# Patient Record
Sex: FEMALE | Race: WHITE | NOT HISPANIC OR LATINO | Employment: FULL TIME | URBAN - NONMETROPOLITAN AREA
[De-identification: names, ages, dates, MRNs, and addresses within clinical notes are randomized per-mention and may not be internally consistent; named-entity substitution may affect disease eponyms.]

---

## 2023-05-24 LAB
ALANINE AMINOTRANSFERASE (SGPT) (U/L) IN SER/PLAS: 12 U/L (ref 7–45)
ALBUMIN (G/DL) IN SER/PLAS: 4.1 G/DL (ref 3.4–5)
ALKALINE PHOSPHATASE (U/L) IN SER/PLAS: 59 U/L (ref 33–110)
AMYLASE (U/L) IN SER/PLAS: 27 U/L (ref 29–103)
ANION GAP IN SER/PLAS: 13 MMOL/L (ref 10–20)
ASPARTATE AMINOTRANSFERASE (SGOT) (U/L) IN SER/PLAS: 11 U/L (ref 9–39)
BASOPHILS (10*3/UL) IN BLOOD BY AUTOMATED COUNT: 0.15 X10E9/L (ref 0–0.1)
BASOPHILS/100 LEUKOCYTES IN BLOOD BY AUTOMATED COUNT: 1.4 % (ref 0–2)
BILIRUBIN TOTAL (MG/DL) IN SER/PLAS: 0.4 MG/DL (ref 0–1.2)
CALCIUM (MG/DL) IN SER/PLAS: 9.3 MG/DL (ref 8.6–10.3)
CARBON DIOXIDE, TOTAL (MMOL/L) IN SER/PLAS: 25 MMOL/L (ref 21–32)
CHLORIDE (MMOL/L) IN SER/PLAS: 104 MMOL/L (ref 98–107)
CREATININE (MG/DL) IN SER/PLAS: 0.72 MG/DL (ref 0.5–1.05)
EOSINOPHILS (10*3/UL) IN BLOOD BY AUTOMATED COUNT: 2.75 X10E9/L (ref 0–0.7)
EOSINOPHILS/100 LEUKOCYTES IN BLOOD BY AUTOMATED COUNT: 25.7 % (ref 0–6)
ERYTHROCYTE DISTRIBUTION WIDTH (RATIO) BY AUTOMATED COUNT: 13.9 % (ref 11.5–14.5)
ERYTHROCYTE MEAN CORPUSCULAR HEMOGLOBIN CONCENTRATION (G/DL) BY AUTOMATED: 32.7 G/DL (ref 32–36)
ERYTHROCYTE MEAN CORPUSCULAR VOLUME (FL) BY AUTOMATED COUNT: 96 FL (ref 80–100)
ERYTHROCYTES (10*6/UL) IN BLOOD BY AUTOMATED COUNT: 4.62 X10E12/L (ref 4–5.2)
GFR FEMALE: >90 ML/MIN/1.73M2
GLUCOSE (MG/DL) IN SER/PLAS: 91 MG/DL (ref 74–99)
HEMATOCRIT (%) IN BLOOD BY AUTOMATED COUNT: 44.3 % (ref 36–46)
HEMOGLOBIN (G/DL) IN BLOOD: 14.5 G/DL (ref 12–16)
IMMATURE GRANULOCYTES/100 LEUKOCYTES IN BLOOD BY AUTOMATED COUNT: 0.3 % (ref 0–0.9)
LEUKOCYTES (10*3/UL) IN BLOOD BY AUTOMATED COUNT: 10.7 X10E9/L (ref 4.4–11.3)
LIPASE (U/L) IN SER/PLAS: 9 U/L (ref 9–82)
LYMPHOCYTES (10*3/UL) IN BLOOD BY AUTOMATED COUNT: 2.83 X10E9/L (ref 1.2–4.8)
LYMPHOCYTES/100 LEUKOCYTES IN BLOOD BY AUTOMATED COUNT: 26.4 % (ref 13–44)
MONOCYTES (10*3/UL) IN BLOOD BY AUTOMATED COUNT: 0.71 X10E9/L (ref 0.1–1)
MONOCYTES/100 LEUKOCYTES IN BLOOD BY AUTOMATED COUNT: 6.6 % (ref 2–10)
NEUTROPHILS (10*3/UL) IN BLOOD BY AUTOMATED COUNT: 4.23 X10E9/L (ref 1.2–7.7)
NEUTROPHILS/100 LEUKOCYTES IN BLOOD BY AUTOMATED COUNT: 39.6 % (ref 40–80)
PLATELETS (10*3/UL) IN BLOOD AUTOMATED COUNT: 272 X10E9/L (ref 150–450)
POTASSIUM (MMOL/L) IN SER/PLAS: 3.9 MMOL/L (ref 3.5–5.3)
PROTEIN TOTAL: 7 G/DL (ref 6.4–8.2)
SODIUM (MMOL/L) IN SER/PLAS: 138 MMOL/L (ref 136–145)
UREA NITROGEN (MG/DL) IN SER/PLAS: 9 MG/DL (ref 6–23)

## 2023-05-25 LAB
APPEARANCE, URINE: CLEAR
BILIRUBIN, URINE: NEGATIVE
BLOOD, URINE: NEGATIVE
COLOR, URINE: YELLOW
GLUCOSE, URINE: NEGATIVE MG/DL
KETONES, URINE: NEGATIVE MG/DL
LEUKOCYTE ESTERASE, URINE: ABNORMAL
NITRITE, URINE: NEGATIVE
PH, URINE: 7 (ref 5–8)
PROTEIN, URINE: NEGATIVE MG/DL
RBC, URINE: ABNORMAL /HPF (ref 0–5)
SPECIFIC GRAVITY, URINE: 1.01 (ref 1–1.03)
SQUAMOUS EPITHELIAL CELLS, URINE: 5 /HPF
UROBILINOGEN, URINE: <2 MG/DL (ref 0–1.9)
WBC, URINE: 1 /HPF (ref 0–5)

## 2023-06-06 LAB
ALANINE AMINOTRANSFERASE (SGPT) (U/L) IN SER/PLAS: 12 U/L (ref 7–45)
ALBUMIN (G/DL) IN SER/PLAS: 4.3 G/DL (ref 3.4–5)
ALKALINE PHOSPHATASE (U/L) IN SER/PLAS: 58 U/L (ref 33–110)
ANION GAP IN SER/PLAS: 12 MMOL/L (ref 10–20)
APPEARANCE, URINE: ABNORMAL
ASPARTATE AMINOTRANSFERASE (SGOT) (U/L) IN SER/PLAS: 12 U/L (ref 9–39)
BACTERIA, URINE: ABNORMAL /HPF
BASOPHILS (10*3/UL) IN BLOOD BY AUTOMATED COUNT: 0.17 X10E9/L (ref 0–0.1)
BASOPHILS/100 LEUKOCYTES IN BLOOD BY AUTOMATED COUNT: 1.4 % (ref 0–2)
BILIRUBIN TOTAL (MG/DL) IN SER/PLAS: 0.5 MG/DL (ref 0–1.2)
BILIRUBIN, URINE: NEGATIVE
BLOOD, URINE: NEGATIVE
CALCIUM (MG/DL) IN SER/PLAS: 9.3 MG/DL (ref 8.6–10.3)
CARBON DIOXIDE, TOTAL (MMOL/L) IN SER/PLAS: 26 MMOL/L (ref 21–32)
CHLORIDE (MMOL/L) IN SER/PLAS: 104 MMOL/L (ref 98–107)
COLOR, URINE: YELLOW
CREATININE (MG/DL) IN SER/PLAS: 0.63 MG/DL (ref 0.5–1.05)
EOSINOPHILS (10*3/UL) IN BLOOD BY AUTOMATED COUNT: 4.33 X10E9/L (ref 0–0.7)
EOSINOPHILS/100 LEUKOCYTES IN BLOOD BY AUTOMATED COUNT: 36.2 % (ref 0–6)
ERYTHROCYTE DISTRIBUTION WIDTH (RATIO) BY AUTOMATED COUNT: 13.7 % (ref 11.5–14.5)
ERYTHROCYTE MEAN CORPUSCULAR HEMOGLOBIN CONCENTRATION (G/DL) BY AUTOMATED: 32.6 G/DL (ref 32–36)
ERYTHROCYTE MEAN CORPUSCULAR VOLUME (FL) BY AUTOMATED COUNT: 95 FL (ref 80–100)
ERYTHROCYTES (10*6/UL) IN BLOOD BY AUTOMATED COUNT: 4.77 X10E12/L (ref 4–5.2)
GFR FEMALE: >90 ML/MIN/1.73M2
GLUCOSE (MG/DL) IN SER/PLAS: 101 MG/DL (ref 74–99)
GLUCOSE, URINE: NEGATIVE MG/DL
HEMATOCRIT (%) IN BLOOD BY AUTOMATED COUNT: 45.4 % (ref 36–46)
HEMOGLOBIN (G/DL) IN BLOOD: 14.8 G/DL (ref 12–16)
IMMATURE GRANULOCYTES/100 LEUKOCYTES IN BLOOD BY AUTOMATED COUNT: 0.2 % (ref 0–0.9)
KETONES, URINE: NEGATIVE MG/DL
LEUKOCYTE ESTERASE, URINE: ABNORMAL
LEUKOCYTES (10*3/UL) IN BLOOD BY AUTOMATED COUNT: 12 X10E9/L (ref 4.4–11.3)
LYMPHOCYTES (10*3/UL) IN BLOOD BY AUTOMATED COUNT: 3.09 X10E9/L (ref 1.2–4.8)
LYMPHOCYTES/100 LEUKOCYTES IN BLOOD BY AUTOMATED COUNT: 25.8 % (ref 13–44)
MONOCYTES (10*3/UL) IN BLOOD BY AUTOMATED COUNT: 0.71 X10E9/L (ref 0.1–1)
MONOCYTES/100 LEUKOCYTES IN BLOOD BY AUTOMATED COUNT: 5.9 % (ref 2–10)
MUCUS, URINE: ABNORMAL /LPF
NEUTROPHILS (10*3/UL) IN BLOOD BY AUTOMATED COUNT: 3.65 X10E9/L (ref 1.2–7.7)
NEUTROPHILS/100 LEUKOCYTES IN BLOOD BY AUTOMATED COUNT: 30.5 % (ref 40–80)
NITRITE, URINE: NEGATIVE
PH, URINE: 5 (ref 5–8)
PLATELETS (10*3/UL) IN BLOOD AUTOMATED COUNT: 253 X10E9/L (ref 150–450)
POTASSIUM (MMOL/L) IN SER/PLAS: 3.8 MMOL/L (ref 3.5–5.3)
PROTEIN TOTAL: 7.3 G/DL (ref 6.4–8.2)
PROTEIN, URINE: NEGATIVE MG/DL
RBC, URINE: 1 /HPF (ref 0–5)
SODIUM (MMOL/L) IN SER/PLAS: 138 MMOL/L (ref 136–145)
SPECIFIC GRAVITY, URINE: 1.01 (ref 1–1.03)
SQUAMOUS EPITHELIAL CELLS, URINE: 3 /HPF
TRANSITIONAL EPITHELIAL CELLS, URINE: <1 /HPF
UREA NITROGEN (MG/DL) IN SER/PLAS: 9 MG/DL (ref 6–23)
UROBILINOGEN, URINE: <2 MG/DL (ref 0–1.9)
WBC, URINE: 4 /HPF (ref 0–5)

## 2023-06-07 LAB
ANTI-NUCLEAR ANTIBODY (ANA): NEGATIVE
HEPATITIS A VIRUS IGM AB PRESENCE IN SER/PLAS BY IMMUNOASSAY: NONREACTIVE
HEPATITIS B VIRUS CORE IGM AB PRESENCE IN SER/PLAS BY IMMUNOASSY: NONREACTIVE
HEPATITIS B VIRUS SURFACE AG PRESENCE IN SERUM: NONREACTIVE
HEPATITIS C VIRUS AB PRESENCE IN SERUM: NONREACTIVE

## 2023-06-08 LAB — URINE CULTURE: NORMAL

## 2023-09-27 PROBLEM — F41.9 ANXIETY: Status: ACTIVE | Noted: 2023-09-27

## 2023-09-27 PROBLEM — F43.10 POSTTRAUMATIC STRESS DISORDER: Status: ACTIVE | Noted: 2023-09-27

## 2023-09-27 PROBLEM — N39.3 STRESS INCONTINENCE, FEMALE: Status: ACTIVE | Noted: 2023-09-27

## 2023-09-27 PROBLEM — K21.9 GASTROESOPHAGEAL REFLUX DISEASE: Status: ACTIVE | Noted: 2023-09-27

## 2023-09-27 PROBLEM — R32 URINARY INCONTINENCE: Status: ACTIVE | Noted: 2023-09-27

## 2023-09-27 PROBLEM — J45.909 ASTHMA (HHS-HCC): Status: ACTIVE | Noted: 2023-09-27

## 2023-09-27 PROBLEM — I10 HYPERTENSION: Status: ACTIVE | Noted: 2023-09-27

## 2023-09-27 PROBLEM — M54.30 SCIATICA: Status: ACTIVE | Noted: 2023-09-27

## 2023-09-27 PROBLEM — G47.00 INSOMNIA: Status: ACTIVE | Noted: 2023-09-27

## 2023-09-27 PROBLEM — R41.840 ATTENTION AND CONCENTRATION DEFICIT: Status: ACTIVE | Noted: 2023-09-27

## 2023-09-27 PROBLEM — D72.10 EOSINOPHILIA: Status: ACTIVE | Noted: 2023-09-27

## 2023-09-27 PROBLEM — R92.8 ABNORMAL MAMMOGRAM OF LEFT BREAST: Status: ACTIVE | Noted: 2023-09-27

## 2023-09-27 RX ORDER — NORTRIPTYLINE HYDROCHLORIDE 50 MG/1
2 CAPSULE ORAL NIGHTLY
COMMUNITY
End: 2023-12-11

## 2023-09-27 RX ORDER — ETODOLAC 400 MG/1
400 TABLET, FILM COATED ORAL EVERY 8 HOURS PRN
COMMUNITY
Start: 2022-03-07 | End: 2024-01-22 | Stop reason: SDUPTHER

## 2023-09-27 RX ORDER — MOMETASONE FUROATE AND FORMOTEROL FUMARATE DIHYDRATE 200; 5 UG/1; UG/1
2 AEROSOL RESPIRATORY (INHALATION)
COMMUNITY
Start: 2023-06-21 | End: 2024-02-26

## 2023-09-27 RX ORDER — SUCRALFATE 1 G/1
1 TABLET ORAL 4 TIMES DAILY
COMMUNITY
Start: 2023-06-27 | End: 2023-11-21 | Stop reason: WASHOUT

## 2023-09-27 RX ORDER — ALBUTEROL SULFATE 90 UG/1
2 AEROSOL, METERED RESPIRATORY (INHALATION) EVERY 4 HOURS PRN
COMMUNITY
End: 2023-12-11

## 2023-09-27 RX ORDER — METHOCARBAMOL 500 MG/1
1.5 TABLET, FILM COATED ORAL EVERY 4 HOURS
COMMUNITY
End: 2023-10-02

## 2023-09-27 RX ORDER — FLUTICASONE PROPIONATE 50 MCG
2 SPRAY, SUSPENSION (ML) NASAL DAILY PRN
COMMUNITY
Start: 2021-05-24 | End: 2023-10-02

## 2023-09-27 RX ORDER — PANTOPRAZOLE SODIUM 40 MG/1
40 TABLET, DELAYED RELEASE ORAL
COMMUNITY
Start: 2023-06-27

## 2023-09-27 RX ORDER — FLUOXETINE HYDROCHLORIDE 20 MG/1
20 CAPSULE ORAL DAILY
COMMUNITY
End: 2024-01-22 | Stop reason: ALTCHOICE

## 2023-09-27 RX ORDER — FLUTICASONE PROPIONATE AND SALMETEROL 500; 50 UG/1; UG/1
1 POWDER RESPIRATORY (INHALATION) 2 TIMES DAILY
COMMUNITY
Start: 2022-05-09 | End: 2023-11-21 | Stop reason: WASHOUT

## 2023-09-27 RX ORDER — LOSARTAN POTASSIUM 100 MG/1
1 TABLET ORAL DAILY
COMMUNITY
Start: 2021-10-23 | End: 2024-05-28 | Stop reason: WASHOUT

## 2023-09-27 RX ORDER — IBUPROFEN 200 MG
200 TABLET ORAL 3 TIMES DAILY PRN
COMMUNITY
End: 2023-11-21 | Stop reason: WASHOUT

## 2023-10-02 DIAGNOSIS — M54.50 CHRONIC BILATERAL LOW BACK PAIN WITHOUT SCIATICA: Primary | ICD-10-CM

## 2023-10-02 DIAGNOSIS — J30.1 SEASONAL ALLERGIC RHINITIS DUE TO POLLEN: Primary | ICD-10-CM

## 2023-10-02 DIAGNOSIS — G89.29 CHRONIC BILATERAL LOW BACK PAIN WITHOUT SCIATICA: Primary | ICD-10-CM

## 2023-10-02 DIAGNOSIS — R10.9 UNSPECIFIED ABDOMINAL PAIN: ICD-10-CM

## 2023-10-02 RX ORDER — DICYCLOMINE HYDROCHLORIDE 20 MG/1
TABLET ORAL
Qty: 90 TABLET | Refills: 1 | Status: SHIPPED | OUTPATIENT
Start: 2023-10-02 | End: 2023-11-21 | Stop reason: WASHOUT

## 2023-10-02 RX ORDER — METHOCARBAMOL 500 MG/1
TABLET, FILM COATED ORAL
Qty: 60 TABLET | Refills: 1 | Status: SHIPPED | OUTPATIENT
Start: 2023-10-02 | End: 2024-01-30 | Stop reason: SDUPTHER

## 2023-10-02 RX ORDER — FLUTICASONE PROPIONATE 50 MCG
SPRAY, SUSPENSION (ML) NASAL
Qty: 16 ML | Refills: 2 | Status: SHIPPED | OUTPATIENT
Start: 2023-10-02 | End: 2024-01-16

## 2023-10-25 ENCOUNTER — APPOINTMENT (OUTPATIENT)
Dept: PRIMARY CARE | Facility: CLINIC | Age: 58
End: 2023-10-25
Payer: MEDICAID

## 2023-11-08 ENCOUNTER — HOSPITAL ENCOUNTER (EMERGENCY)
Facility: HOSPITAL | Age: 58
Discharge: HOME | End: 2023-11-08
Payer: COMMERCIAL

## 2023-11-08 ENCOUNTER — APPOINTMENT (OUTPATIENT)
Dept: RADIOLOGY | Facility: HOSPITAL | Age: 58
End: 2023-11-08
Payer: COMMERCIAL

## 2023-11-08 VITALS
TEMPERATURE: 0.6 F | DIASTOLIC BLOOD PRESSURE: 82 MMHG | SYSTOLIC BLOOD PRESSURE: 130 MMHG | RESPIRATION RATE: 16 BRPM | HEART RATE: 74 BPM | BODY MASS INDEX: 38.28 KG/M2 | OXYGEN SATURATION: 98 % | HEIGHT: 60 IN | WEIGHT: 195 LBS

## 2023-11-08 DIAGNOSIS — S43.401A SPRAIN OF RIGHT SHOULDER, UNSPECIFIED SHOULDER SPRAIN TYPE, INITIAL ENCOUNTER: Primary | ICD-10-CM

## 2023-11-08 PROCEDURE — 99283 EMERGENCY DEPT VISIT LOW MDM: CPT | Mod: 25

## 2023-11-08 PROCEDURE — 2500000005 HC RX 250 GENERAL PHARMACY W/O HCPCS: Mod: SE | Performed by: HEALTH CARE PROVIDER

## 2023-11-08 PROCEDURE — 2500000004 HC RX 250 GENERAL PHARMACY W/ HCPCS (ALT 636 FOR OP/ED): Mod: SE | Performed by: HEALTH CARE PROVIDER

## 2023-11-08 PROCEDURE — 73030 X-RAY EXAM OF SHOULDER: CPT | Mod: RIGHT SIDE | Performed by: RADIOLOGY

## 2023-11-08 PROCEDURE — 99285 EMERGENCY DEPT VISIT HI MDM: CPT | Mod: 25

## 2023-11-08 PROCEDURE — 73030 X-RAY EXAM OF SHOULDER: CPT | Mod: RT,FY

## 2023-11-08 PROCEDURE — 96372 THER/PROPH/DIAG INJ SC/IM: CPT

## 2023-11-08 RX ORDER — LIDOCAINE 560 MG/1
1 PATCH PERCUTANEOUS; TOPICAL; TRANSDERMAL DAILY
Status: DISCONTINUED | OUTPATIENT
Start: 2023-11-08 | End: 2023-11-08 | Stop reason: HOSPADM

## 2023-11-08 RX ORDER — KETOROLAC TROMETHAMINE 30 MG/ML
30 INJECTION, SOLUTION INTRAMUSCULAR; INTRAVENOUS ONCE
Status: COMPLETED | OUTPATIENT
Start: 2023-11-08 | End: 2023-11-08

## 2023-11-08 RX ADMIN — LIDOCAINE 1 PATCH: 560 PATCH PERCUTANEOUS; TOPICAL; TRANSDERMAL at 15:18

## 2023-11-08 RX ADMIN — KETOROLAC TROMETHAMINE 30 MG: 30 INJECTION, SOLUTION INTRAMUSCULAR; INTRAVENOUS at 15:17

## 2023-11-08 ASSESSMENT — COLUMBIA-SUICIDE SEVERITY RATING SCALE - C-SSRS
2. HAVE YOU ACTUALLY HAD ANY THOUGHTS OF KILLING YOURSELF?: NO
1. IN THE PAST MONTH, HAVE YOU WISHED YOU WERE DEAD OR WISHED YOU COULD GO TO SLEEP AND NOT WAKE UP?: NO
6. HAVE YOU EVER DONE ANYTHING, STARTED TO DO ANYTHING, OR PREPARED TO DO ANYTHING TO END YOUR LIFE?: NO

## 2023-11-08 ASSESSMENT — PAIN DESCRIPTION - ORIENTATION: ORIENTATION: RIGHT

## 2023-11-08 ASSESSMENT — PAIN SCALES - GENERAL: PAINLEVEL_OUTOF10: 4

## 2023-11-08 ASSESSMENT — PAIN DESCRIPTION - LOCATION: LOCATION: SHOULDER

## 2023-11-08 ASSESSMENT — PAIN - FUNCTIONAL ASSESSMENT: PAIN_FUNCTIONAL_ASSESSMENT: 0-10

## 2023-11-08 NOTE — ED PROVIDER NOTES
HPI   Chief Complaint   Patient presents with    Shoulder Pain    Fall     Patient had a tripping fall today, twisted and fell onto right shoulder. Pain to right shoulder, denies any other injury       CC: Right shoulder injury  HPI:   58-year-old female presents ED with acute right shoulder injury patient states she tripped on uneven surface, tucked and rolled landing on her right shoulder she does have some minimal range of motion with some tenderness mostly on the anterior superior lateral aspect of the shoulder denies any prior injuries or surgeries to the right upper extremity.  Denies any distal numbness or paresthesia, she did not strike her head or lose consciousness and she denies any headache, dizziness or cervical neck tenderness denies having any chest pain or shortness of breath.  Patient is right-hand dominant.    Additional Limitations to History:   External Records Reviewed: I reviewed recent and relevant outside records including  History Obtained From:     Past Medical History: Per HPI  Medications: Reviewed in EMR and with patient  Allergies:  Reviewed in EMR  Past Surgical History:   Social History:     ------------------------------------------------------------------------------------------------------  Physical Exam:  --Vital signs reviewed in nursing triage note, EMR flow sheets, and at patient's bedside  GEN:  A&Ox3, no acute distress, appears comfortable.  Conversational and appropriate.  No confusion or gross mental status changes.  EYES: EOMI, non-injected sclera.  ENT: Moist mucous membranes, no apparent injuries or lesions.   CARDIO: Normal rate and regular rhythm. No murmurs, rubs, or gallops.  2+ equal pulses of the distal extremities.   PULM: Clear to auscultation bilaterally. No rales, rhonchi, or wheezes. Good symmetric chest expansion.  GI: Soft, non-tender, non-distended. No rebound tenderness or guarding.  SKIN: Warm and dry, no rashes or lesions.  MSK: ROM intact the  extremities without contractures.   EXT: No peripheral edema, contusions, or wounds.   NEURO: Cranial nerves II-XII grossly intact. Sensation to light touch intact and equal bilaterally in upper and lower extremities.  Symmetric 5/5 strength in upper and lower extremities.  PSYCH: Appropriate mood and behavior, converses and responds appropriately during exam.  -------------------------------------------------------------------------------------------------------      Differential Diagnoses Considered:   Chronic Medical Conditions Significantly Affecting Care:   Diagnostic testing considered: [PERC, D-Dimer, PECARN, etc.]      - I independently interpreted: [CXR, CT, POCUS, etc. including your interpretation]  - Labs notable for     Escalation of Care: Appropriate for   Social Determinants of Health Significantly Affecting Care: [Homelessness, lacking transportation, uninsured, unable to afford medications]  Prescription Drug Consideration: [Antibiotics, antivirals, pain medications, etc.]  Discussion of Management with Other Providers:  I discussed the patient/results with: [admitting team, consultant, radiologist, social work, EPAT, case management, PT/OT, RT, PCP, etc.]      Sajan Eng PA-C                          No data recorded                Patient History   Past Medical History:   Diagnosis Date    Personal history of other diseases of the circulatory system 07/11/2022    History of hypertension     Past Surgical History:   Procedure Laterality Date    OTHER SURGICAL HISTORY  07/11/2022    Cholecystectomy    OTHER SURGICAL HISTORY  07/11/2022    Tonsillectomy     Family History   Problem Relation Name Age of Onset    Other (degenerative joint/disk disease) Mother      Hypertension Mother      Other (scolio) Mother      Leukemia Father      Hypertension Father      Cancer Father      Skin cancer Brother      No Known Problems Daughter      No Known Problems Daughter      Colon cancer Maternal Grandmother       Lung cancer Maternal Grandfather      Heart disease Paternal Grandmother      Stroke Paternal Grandmother      Heart disease Paternal Grandfather      Hypertension Sibling      Cancer Sibling       Social History     Tobacco Use    Smoking status: Not on file    Smokeless tobacco: Not on file   Substance Use Topics    Alcohol use: Not on file    Drug use: Not on file       Physical Exam   ED Triage Vitals [11/08/23 1325]   Temp Heart Rate Resp BP   (!) -17.4 °C (0.6 °F) 74 16 130/82      SpO2 Temp src Heart Rate Source Patient Position   98 % -- -- --      BP Location FiO2 (%)     -- --       Physical Exam  Musculoskeletal:      Right shoulder: Tenderness present. No swelling, deformity or crepitus. Decreased range of motion. Normal pulse.        Arms:          ED Course & MDM   Diagnoses as of 11/08/23 1437   Sprain of right shoulder, unspecified shoulder sprain type, initial encounter       Medical Decision Making  Imaging appears unremarkable, likely contusion soft tissue injury or strain/sprain, conservative management        Procedure  Procedures     Sajan Eng PA-C  11/08/23 1437

## 2023-11-08 NOTE — ED TRIAGE NOTES
Patient had a tripping fall today, twisted and fell onto right shoulder. Pain to right shoulder, denies any other injury

## 2023-11-21 ENCOUNTER — OFFICE VISIT (OUTPATIENT)
Dept: PRIMARY CARE | Facility: CLINIC | Age: 58
End: 2023-11-21
Payer: COMMERCIAL

## 2023-11-21 VITALS
SYSTOLIC BLOOD PRESSURE: 120 MMHG | BODY MASS INDEX: 43.11 KG/M2 | HEIGHT: 60 IN | OXYGEN SATURATION: 98 % | WEIGHT: 219.6 LBS | DIASTOLIC BLOOD PRESSURE: 80 MMHG | HEART RATE: 68 BPM | TEMPERATURE: 94.6 F

## 2023-11-21 DIAGNOSIS — R41.840 ATTENTION AND CONCENTRATION DEFICIT: ICD-10-CM

## 2023-11-21 DIAGNOSIS — G89.29 CHRONIC RIGHT SHOULDER PAIN: ICD-10-CM

## 2023-11-21 DIAGNOSIS — M25.511 CHRONIC RIGHT SHOULDER PAIN: ICD-10-CM

## 2023-11-21 DIAGNOSIS — Z91.030 BEE STING ALLERGY: Primary | ICD-10-CM

## 2023-11-21 DIAGNOSIS — M54.9 DORSALGIA: ICD-10-CM

## 2023-11-21 PROCEDURE — 3074F SYST BP LT 130 MM HG: CPT | Performed by: FAMILY MEDICINE

## 2023-11-21 PROCEDURE — 99214 OFFICE O/P EST MOD 30 MIN: CPT | Performed by: FAMILY MEDICINE

## 2023-11-21 PROCEDURE — 3079F DIAST BP 80-89 MM HG: CPT | Performed by: FAMILY MEDICINE

## 2023-11-21 PROCEDURE — 1036F TOBACCO NON-USER: CPT | Performed by: FAMILY MEDICINE

## 2023-11-21 RX ORDER — GABAPENTIN 300 MG/1
300 CAPSULE ORAL 3 TIMES DAILY
COMMUNITY
Start: 2023-10-04 | End: 2023-11-21 | Stop reason: WASHOUT

## 2023-11-21 RX ORDER — DICLOFENAC SODIUM 75 MG/1
75 TABLET, DELAYED RELEASE ORAL 2 TIMES DAILY
COMMUNITY
Start: 2023-03-08 | End: 2023-11-21 | Stop reason: WASHOUT

## 2023-11-21 RX ORDER — ATOMOXETINE 40 MG/1
40 CAPSULE ORAL DAILY
Qty: 30 CAPSULE | Refills: 1 | Status: SHIPPED | OUTPATIENT
Start: 2023-11-21 | End: 2024-02-26

## 2023-11-21 RX ORDER — EPINEPHRINE 0.3 MG/.3ML
0.3 INJECTION SUBCUTANEOUS AS NEEDED
Qty: 0.6 ML | Refills: 1 | Status: SHIPPED | OUTPATIENT
Start: 2023-11-21

## 2023-11-21 RX ORDER — INHALER, ASSIST DEVICES
1 SPACER (EA) MISCELLANEOUS AS NEEDED
COMMUNITY
Start: 2023-10-11 | End: 2023-11-21 | Stop reason: WASHOUT

## 2023-11-21 RX ORDER — MOMETASONE FUROATE AND FORMOTEROL FUMARATE DIHYDRATE 100; 5 UG/1; UG/1
2 AEROSOL RESPIRATORY (INHALATION) 2 TIMES DAILY
COMMUNITY
Start: 2023-06-08 | End: 2024-05-28 | Stop reason: WASHOUT

## 2023-11-21 RX ORDER — HYDROXYZINE HYDROCHLORIDE 25 MG/1
25 TABLET, FILM COATED ORAL ONCE
COMMUNITY
Start: 2023-01-06 | End: 2023-11-21 | Stop reason: WASHOUT

## 2023-11-21 ASSESSMENT — ENCOUNTER SYMPTOMS
DIFFICULTY URINATING: 0
SHORTNESS OF BREATH: 0
FLANK PAIN: 1
DIZZINESS: 0
DIARRHEA: 0
LOSS OF SENSATION IN FEET: 1
ENDOCRINE NEGATIVE: 1
OCCASIONAL FEELINGS OF UNSTEADINESS: 0
NAUSEA: 0
DEPRESSION: 0
FEVER: 0

## 2023-11-21 ASSESSMENT — PATIENT HEALTH QUESTIONNAIRE - PHQ9
1. LITTLE INTEREST OR PLEASURE IN DOING THINGS: NOT AT ALL
2. FEELING DOWN, DEPRESSED OR HOPELESS: NOT AT ALL
SUM OF ALL RESPONSES TO PHQ9 QUESTIONS 1 AND 2: 0

## 2023-11-21 ASSESSMENT — PAIN SCALES - GENERAL: PAINLEVEL: 4

## 2023-11-21 NOTE — PROGRESS NOTES
Subjective   Patient ID: Sinai Mcbride is a 58 y.o. female who presents for Results, Flank Pain (right), and Fall (X 2 weeks ago).    Concentration deficit, trouble concentrating  Anxiety present  Right arm pain shoulder pain since a fall 2 weeks ago  Right upper quadrant pain-chronic    Flank Pain  This is a chronic problem. The current episode started more than 1 year ago. The problem is unchanged. Pertinent negatives include no chest pain or fever.   Fall  Pertinent negatives include no fever or nausea.        Review of Systems   Constitutional:  Negative for fever.        Also see HPI   Eyes:  Negative for visual disturbance.   Respiratory:  Negative for shortness of breath.    Cardiovascular:  Negative for chest pain.   Gastrointestinal:  Negative for diarrhea and nausea.   Endocrine: Negative.    Genitourinary:  Positive for flank pain. Negative for difficulty urinating.   Skin:  Negative for rash.   Neurological:  Negative for dizziness.        No focal deficits   Psychiatric/Behavioral:  Negative for suicidal ideas.    All other systems reviewed and are negative.      Objective   /80   Pulse 68   Temp 34.8 °C (94.6 °F)   Ht 1.524 m (5')   Wt 99.6 kg (219 lb 9.6 oz)   SpO2 98%   BMI 42.89 kg/m²     Physical Exam  Vitals and nursing note reviewed.   Constitutional:       Appearance: Normal appearance.   HENT:      Head: Normocephalic and atraumatic.   Eyes:      Extraocular Movements: Extraocular movements intact.      Conjunctiva/sclera: Conjunctivae normal.   Cardiovascular:      Rate and Rhythm: Normal rate and regular rhythm.      Heart sounds: Normal heart sounds.   Pulmonary:      Effort: Pulmonary effort is normal.      Breath sounds: Normal breath sounds.      Comments: Lungs essentially CTA b/l  Abdominal:      General: There is no distension.      Palpations: Abdomen is soft. There is no mass.      Tenderness: There is no abdominal tenderness.   Musculoskeletal:      Right lower leg: No  edema.      Left lower leg: No edema.   Skin:     Coloration: Skin is not jaundiced.      Findings: No rash.   Neurological:      General: No focal deficit present.      Mental Status: She is alert and oriented to person, place, and time.   Psychiatric:         Mood and Affect: Mood normal.         Behavior: Behavior normal.         Thought Content: Thought content normal.         Judgment: Judgment normal.       Assessment/Plan   Problem List Items Addressed This Visit             ICD-10-CM    Attention and concentration deficit R41.840    Relevant Medications    atomoxetine (Strattera) 40 mg capsule     Other Visit Diagnoses         Codes    Bee sting allergy    -  Primary Z91.030    Relevant Medications    EPINEPHrine (Epipen) 0.3 mg/0.3 mL injection syringe    Dorsalgia     M54.9    Chronic right shoulder pain     M25.511, G89.29

## 2023-11-21 NOTE — PATIENT INSTRUCTIONS
PAIN MANAGEMENT IN ADULTS    What is pain? Pain is your body’s way to reacting to injury or illness. Everybody reacts to pain in different ways. What you think is painful may not be painful to someone else. But, pain is whatever you say it is!  What causes pain? Many things, such as an injury, surgery, or a disease can cause pain. Some pain is caused by pressure one a nerve, such as a cancerous tumor or slipped disc. Other pain is caused when nerves are cut as in an accident or surgery. After an injury or surgery you may not want to move the painful part of our body at all. But, you may have pain because you are not moving this body part. Sometimes there is no clear reason for your pain.    What are the different types of pain?  Acute pain is short?lived and lasts less than 3 months. Caregivers help first work to remove the cause of the pain, such as fixing a broken arm. Acute pain usually can be controlled or stopped with pain medicine.  Chronic pain lasts longer than 3?6 months. This kind of pain is often more complicated. Caregivers may use medicine along with other treatments, like self?hypnosis and relaxation to help you learn to deal with the chronic pain.  What is your pain like? Caregivers want you to talk to them about your pain. This helps them learn what may be causing the pain and how best to treat it.  Why is pain control important? Pain can affect your appetite, how well you sleep, your energy and your ability to do things. Pain can also affect your mood and relationship with others. If caregivers can help you control your pain, you will suffer less and can even heal faster.  How can pain medicine be given? Following are the many different ways pain medicine can be given depending on the kind of pain you are experiencing.  By mouth: you may be given pills or liquid to swallow or you may be given a pill or liquid to put under your tongue. Some medicine can also be given as a lozenge like a cough drop or  even a special lollipop.  Rectal: medicine in a suppository is put into your rectum.  Shot/Injection: pain medicine can be given as a shot/injection into an IV, into a muscle or under the skin  Topical: medicine in a cream or gel is spread over the skin.  Transdermal: medicine given in a patch and put onto the skin. This medicine is released slowly to give pain relief for as long as 72 hours.    How can you take pain medicine safely and make it work best for you? The following are many different ways pain medicine can be given depending on the kind of pain you have.  Some pain medicines can make you breathe less deeply and less often. The medicine may also make you sleepy, dizzy, and unsafe to drive a car or use heavy equipment. For these reasons, it is very important to follow your caregiver’s advice on how to use this medicine.  Sometimes the pain is worse when you first wake up in the morning. This may happened if you did not have enough pain medicine in your blood stream to last through the night. Caregivers may tell you to take a dose of pain medicine during the night.  Some foods, alcohol, and other medicines may cause unpleasant side effects when you take pain medicine. Follow your caregiver’s advice about how to prevent these problems.  Pain medicine can make you constipated. Straining with a BM can make you pain worse. Do not try to push the BM out if it’s too hard. Contact your caregiver if you become constipated.  Other non?drug pain control methods. There are many pain control techniques that can held you deal with pain even if it does not go away completely. It is important to practice some of the techniques when you don’t have pain if possible. This will help the technique work better during an attack of pain.  Cold and heat: both cold and heat can help lessen some types of post?op pain. Caregivers will tell you if cold and/or hot packs will help your pain.  Distraction: teaches you to focus your  attention on something other than pain. Playing cards or games, talking and visiting family may relax you and keep you from thinking about pain.  Hydrotherapy: is a gentle water exercise program. It can strengthen muscles that are not injured and lessen inflammation. Talk to your physical therapist or your caregiver about starting a hydrotherapy program.  Massage  Music  Physical therapy  Rest  Surgery/Nerve blocks  Call your caregiver if you have any of the following problems:  The pain medicine you are taking makes you sleepier than usual or confused  You have new pain or the pain seems different than before  You have constipation  Care agreement: You have the right to help plan your care. To help with this plan you must learn about your pain, what is causing it, and how it can be treated. You can then discuss treatment options with your caregivers. Work with them to decide what care will be used to treat you. You always have the right to refuse treatment.

## 2023-12-01 DIAGNOSIS — I10 PRIMARY HYPERTENSION: ICD-10-CM

## 2023-12-03 DIAGNOSIS — R06.02 SHORTNESS OF BREATH: Primary | ICD-10-CM

## 2023-12-11 RX ORDER — NORTRIPTYLINE HYDROCHLORIDE 50 MG/1
100 CAPSULE ORAL NIGHTLY
Qty: 180 CAPSULE | Refills: 1 | Status: SHIPPED | OUTPATIENT
Start: 2023-12-11 | End: 2024-04-30 | Stop reason: WASHOUT

## 2023-12-11 RX ORDER — ALBUTEROL SULFATE 90 UG/1
2 AEROSOL, METERED RESPIRATORY (INHALATION) AS NEEDED
Qty: 8.5 G | Refills: 5 | Status: SHIPPED | OUTPATIENT
Start: 2023-12-11

## 2023-12-30 DIAGNOSIS — J30.1 SEASONAL ALLERGIC RHINITIS DUE TO POLLEN: ICD-10-CM

## 2024-01-16 RX ORDER — FLUTICASONE PROPIONATE 50 MCG
SPRAY, SUSPENSION (ML) NASAL
Qty: 48 ML | Refills: 3 | Status: SHIPPED | OUTPATIENT
Start: 2024-01-16 | End: 2024-03-19 | Stop reason: SDUPTHER

## 2024-01-19 PROBLEM — E66.812 OBESITY, CLASS II, BMI 35-39.9: Status: ACTIVE | Noted: 2023-07-07

## 2024-01-19 PROBLEM — E66.9 OBESITY, CLASS II, BMI 35-39.9: Status: ACTIVE | Noted: 2023-07-07

## 2024-01-19 PROBLEM — M79.18 MYOFASCIAL PAIN: Status: ACTIVE | Noted: 2023-10-04

## 2024-01-19 PROBLEM — M79.2 NEUROPATHIC PAIN: Status: ACTIVE | Noted: 2023-10-04

## 2024-01-22 ENCOUNTER — OFFICE VISIT (OUTPATIENT)
Dept: PRIMARY CARE | Facility: CLINIC | Age: 59
End: 2024-01-22
Payer: COMMERCIAL

## 2024-01-22 VITALS
HEART RATE: 60 BPM | TEMPERATURE: 98 F | HEIGHT: 60 IN | WEIGHT: 220.6 LBS | OXYGEN SATURATION: 98 % | SYSTOLIC BLOOD PRESSURE: 130 MMHG | DIASTOLIC BLOOD PRESSURE: 80 MMHG | BODY MASS INDEX: 43.31 KG/M2

## 2024-01-22 DIAGNOSIS — M54.9 DORSALGIA: ICD-10-CM

## 2024-01-22 DIAGNOSIS — R42 DIZZINESS: Primary | ICD-10-CM

## 2024-01-22 DIAGNOSIS — F41.9 ANXIETY: ICD-10-CM

## 2024-01-22 DIAGNOSIS — R41.840 ATTENTION AND CONCENTRATION DEFICIT: ICD-10-CM

## 2024-01-22 PROCEDURE — 99214 OFFICE O/P EST MOD 30 MIN: CPT | Performed by: FAMILY MEDICINE

## 2024-01-22 PROCEDURE — 3079F DIAST BP 80-89 MM HG: CPT | Performed by: FAMILY MEDICINE

## 2024-01-22 PROCEDURE — 1036F TOBACCO NON-USER: CPT | Performed by: FAMILY MEDICINE

## 2024-01-22 PROCEDURE — 3075F SYST BP GE 130 - 139MM HG: CPT | Performed by: FAMILY MEDICINE

## 2024-01-22 RX ORDER — ETODOLAC 400 MG/1
400 TABLET, FILM COATED ORAL 2 TIMES DAILY PRN
Qty: 60 TABLET | Refills: 1 | Status: SHIPPED | OUTPATIENT
Start: 2024-01-22

## 2024-01-22 RX ORDER — MECLIZINE HYDROCHLORIDE 25 MG/1
25 TABLET ORAL EVERY 6 HOURS PRN
Qty: 30 TABLET | Refills: 5 | Status: SHIPPED | OUTPATIENT
Start: 2024-01-22 | End: 2024-04-30 | Stop reason: WASHOUT

## 2024-01-22 RX ORDER — FLUOXETINE 10 MG/1
10 CAPSULE ORAL DAILY
Qty: 30 CAPSULE | Refills: 1 | Status: SHIPPED | OUTPATIENT
Start: 2024-01-22 | End: 2024-04-30 | Stop reason: WASHOUT

## 2024-01-22 ASSESSMENT — ENCOUNTER SYMPTOMS
DEPRESSION: 0
LOSS OF SENSATION IN FEET: 0
BACK PAIN: 1
OCCASIONAL FEELINGS OF UNSTEADINESS: 0
ABDOMINAL PAIN: 1
DIZZINESS: 1

## 2024-01-22 ASSESSMENT — PAIN SCALES - GENERAL: PAINLEVEL: 6

## 2024-01-22 ASSESSMENT — PATIENT HEALTH QUESTIONNAIRE - PHQ9
2. FEELING DOWN, DEPRESSED OR HOPELESS: NOT AT ALL
1. LITTLE INTEREST OR PLEASURE IN DOING THINGS: NOT AT ALL
SUM OF ALL RESPONSES TO PHQ9 QUESTIONS 1 AND 2: 0

## 2024-01-22 NOTE — PROGRESS NOTES
Subjective   Patient ID: Sinai Mcbride is a 58 y.o. female who presents for right side pain (Follow up).    Dizziness  Back pain  Anxiety  Attention and concentration deficit    Pain of RUQ/right ribs for months. Previously found to have gastrohepatic lymphadenopathy that is likely to be of reactive etiology; followed up with imaging and biopsies, found to be benign. Reports having taken gabapentin for pain which did not provide relief. Currently does intercostal nerve blocks that helps numb the area for a day but the pain returns the next day.    Secondary concern of right lower back pain that radiates down her right leg. History of bulging discs of lumbar spine. She reports the radiating leg pain continues to worsen and travel further down her leg. Tried physical therapy in the past, not currently doing PT but is interested in starting again. Reports doing back stretches at home with no notable improvement. Previously took Methocarbamol and Etodolac with relief of pain, but states she ran out of the second medication.    Tertiary concern of dizziness she relates to her Strattera use, both of which started around December.         Review of Systems   Constitutional:  Negative for fever.        Also see HPI   Eyes:  Negative for visual disturbance.   Respiratory:  Negative for shortness of breath.    Cardiovascular:  Negative for chest pain.   Gastrointestinal:  Positive for abdominal pain (Right upper quadrant/ribs). Negative for diarrhea and nausea.   Endocrine: Negative.    Genitourinary:  Negative for difficulty urinating.   Musculoskeletal:  Positive for back pain (Radiates down right leg, worsening).   Skin:  Negative for rash.   Neurological:  Positive for dizziness.        No focal deficits   Psychiatric/Behavioral:  Negative for suicidal ideas.    All other systems reviewed and are negative.      Objective   /80   Pulse 60   Temp 36.7 °C (98 °F)   Ht 1.524 m (5')   Wt 100 kg (220 lb 9.6 oz)   SpO2  98%   BMI 43.08 kg/m²     Physical Exam  Vitals and nursing note reviewed.   Constitutional:       General: She is not in acute distress.     Appearance: She is obese. She is not ill-appearing.   Cardiovascular:      Rate and Rhythm: Normal rate and regular rhythm.      Pulses: Normal pulses.      Heart sounds: Normal heart sounds.   Pulmonary:      Effort: Pulmonary effort is normal.      Breath sounds: Normal breath sounds.   Abdominal:      Tenderness: There is abdominal tenderness (RUQ/ribs).   Neurological:      Mental Status: She is alert and oriented to person, place, and time.      Sensory: No sensory deficit.      Comments: Hyperesthesia of right hip along L4-L5 dermatomes         Assessment/Plan   Diagnoses and all orders for this visit:  Dizziness  -     meclizine (Antivert) 25 mg tablet; Take 1 tablet (25 mg) by mouth every 6 hours if needed for dizziness.  Attention and concentration deficit  Anxiety  -     FLUoxetine (PROzac) 10 mg capsule; Take 1 capsule (10 mg) by mouth once daily.  Dorsalgia  -     etodolac (Lodine) 400 mg tablet; Take 1 tablet (400 mg) by mouth 2 times a day as needed (Pain). With food

## 2024-01-30 DIAGNOSIS — G89.29 CHRONIC BILATERAL LOW BACK PAIN WITHOUT SCIATICA: ICD-10-CM

## 2024-01-30 DIAGNOSIS — M54.50 CHRONIC BILATERAL LOW BACK PAIN WITHOUT SCIATICA: ICD-10-CM

## 2024-01-30 DIAGNOSIS — M54.9 DORSALGIA: Primary | ICD-10-CM

## 2024-01-31 ASSESSMENT — ENCOUNTER SYMPTOMS
FEVER: 0
NAUSEA: 0
DIFFICULTY URINATING: 0
ENDOCRINE NEGATIVE: 1
SHORTNESS OF BREATH: 0
DIARRHEA: 0

## 2024-02-01 NOTE — PROGRESS NOTES
Subjective   Patient ID: Sinai Mcbride is a 58 y.o. female who presents for right side pain (Follow up).    HPI     Review of Systems    Objective   /80   Pulse 60   Temp 36.7 °C (98 °F)   Ht 1.524 m (5')   Wt 100 kg (220 lb 9.6 oz)   SpO2 98%   BMI 43.08 kg/m²     Physical Exam    Assessment/Plan   {Assess/PlanSmartLinks:12066}

## 2024-02-05 RX ORDER — METHOCARBAMOL 500 MG/1
500 TABLET, FILM COATED ORAL 3 TIMES DAILY PRN
Qty: 60 TABLET | Refills: 2 | Status: SHIPPED | OUTPATIENT
Start: 2024-02-05

## 2024-02-12 DIAGNOSIS — R16.0 HEPATOMEGALY, NOT ELSEWHERE CLASSIFIED: ICD-10-CM

## 2024-02-12 DIAGNOSIS — R41.840 ATTENTION AND CONCENTRATION DEFICIT: ICD-10-CM

## 2024-02-26 RX ORDER — FLUTICASONE PROPIONATE AND SALMETEROL 50; 500 UG/1; UG/1
1 POWDER RESPIRATORY (INHALATION) 2 TIMES DAILY
Qty: 60 EACH | Refills: 11 | OUTPATIENT
Start: 2024-02-26

## 2024-02-26 RX ORDER — MOMETASONE FUROATE AND FORMOTEROL FUMARATE DIHYDRATE 200; 5 UG/1; UG/1
2 AEROSOL RESPIRATORY (INHALATION) 2 TIMES DAILY
Qty: 13 G | Refills: 1 | Status: SHIPPED | OUTPATIENT
Start: 2024-02-26

## 2024-02-26 RX ORDER — ATOMOXETINE 40 MG/1
40 CAPSULE ORAL DAILY
Qty: 30 CAPSULE | Refills: 4 | Status: SHIPPED | OUTPATIENT
Start: 2024-02-26 | End: 2024-04-30 | Stop reason: WASHOUT

## 2024-03-05 ENCOUNTER — APPOINTMENT (OUTPATIENT)
Dept: PRIMARY CARE | Facility: CLINIC | Age: 59
End: 2024-03-05
Payer: COMMERCIAL

## 2024-03-19 ENCOUNTER — OFFICE VISIT (OUTPATIENT)
Dept: PRIMARY CARE | Facility: CLINIC | Age: 59
End: 2024-03-19
Payer: COMMERCIAL

## 2024-03-19 ENCOUNTER — LAB (OUTPATIENT)
Dept: LAB | Facility: LAB | Age: 59
End: 2024-03-19
Payer: COMMERCIAL

## 2024-03-19 VITALS
DIASTOLIC BLOOD PRESSURE: 80 MMHG | WEIGHT: 226 LBS | OXYGEN SATURATION: 94 % | SYSTOLIC BLOOD PRESSURE: 120 MMHG | BODY MASS INDEX: 44.14 KG/M2 | HEART RATE: 72 BPM

## 2024-03-19 DIAGNOSIS — M19.90 ARTHRITIS: ICD-10-CM

## 2024-03-19 DIAGNOSIS — R06.02 SHORTNESS OF BREATH: ICD-10-CM

## 2024-03-19 DIAGNOSIS — J40 BRONCHITIS: ICD-10-CM

## 2024-03-19 DIAGNOSIS — J30.1 SEASONAL ALLERGIC RHINITIS DUE TO POLLEN: ICD-10-CM

## 2024-03-19 DIAGNOSIS — R53.83 TIRED: Primary | ICD-10-CM

## 2024-03-19 DIAGNOSIS — R53.83 TIRED: ICD-10-CM

## 2024-03-19 LAB
ALBUMIN SERPL BCP-MCNC: 4.3 G/DL (ref 3.4–5)
ALP SERPL-CCNC: 55 U/L (ref 33–110)
ALT SERPL W P-5'-P-CCNC: 15 U/L (ref 7–45)
ANION GAP SERPL CALC-SCNC: 11 MMOL/L (ref 10–20)
AST SERPL W P-5'-P-CCNC: 16 U/L (ref 9–39)
BASOPHILS # BLD AUTO: 0.1 X10*3/UL (ref 0–0.1)
BASOPHILS NFR BLD AUTO: 1.1 %
BILIRUB SERPL-MCNC: 0.6 MG/DL (ref 0–1.2)
BUN SERPL-MCNC: 11 MG/DL (ref 6–23)
CALCIUM SERPL-MCNC: 9.5 MG/DL (ref 8.6–10.3)
CHLORIDE SERPL-SCNC: 104 MMOL/L (ref 98–107)
CO2 SERPL-SCNC: 27 MMOL/L (ref 21–32)
CREAT SERPL-MCNC: 0.71 MG/DL (ref 0.5–1.05)
EGFRCR SERPLBLD CKD-EPI 2021: >90 ML/MIN/1.73M*2
EOSINOPHIL # BLD AUTO: 1.89 X10*3/UL (ref 0–0.7)
EOSINOPHIL NFR BLD AUTO: 19.9 %
ERYTHROCYTE [DISTWIDTH] IN BLOOD BY AUTOMATED COUNT: 13.7 % (ref 11.5–14.5)
GLUCOSE SERPL-MCNC: 94 MG/DL (ref 74–99)
HCT VFR BLD AUTO: 43.1 % (ref 36–46)
HGB BLD-MCNC: 14.2 G/DL (ref 12–16)
IMM GRANULOCYTES # BLD AUTO: 0.03 X10*3/UL (ref 0–0.7)
IMM GRANULOCYTES NFR BLD AUTO: 0.3 % (ref 0–0.9)
LYMPHOCYTES # BLD AUTO: 3.38 X10*3/UL (ref 1.2–4.8)
LYMPHOCYTES NFR BLD AUTO: 35.6 %
MCH RBC QN AUTO: 31.1 PG (ref 26–34)
MCHC RBC AUTO-ENTMCNC: 32.9 G/DL (ref 32–36)
MCV RBC AUTO: 94 FL (ref 80–100)
MONOCYTES # BLD AUTO: 0.77 X10*3/UL (ref 0.1–1)
MONOCYTES NFR BLD AUTO: 8.1 %
NEUTROPHILS # BLD AUTO: 3.32 X10*3/UL (ref 1.2–7.7)
NEUTROPHILS NFR BLD AUTO: 35 %
NRBC BLD-RTO: 0 /100 WBCS (ref 0–0)
PLATELET # BLD AUTO: 296 X10*3/UL (ref 150–450)
POTASSIUM SERPL-SCNC: 4.1 MMOL/L (ref 3.5–5.3)
PROT SERPL-MCNC: 7.1 G/DL (ref 6.4–8.2)
RBC # BLD AUTO: 4.57 X10*6/UL (ref 4–5.2)
SODIUM SERPL-SCNC: 138 MMOL/L (ref 136–145)
WBC # BLD AUTO: 9.5 X10*3/UL (ref 4.4–11.3)

## 2024-03-19 PROCEDURE — 82607 VITAMIN B-12: CPT

## 2024-03-19 PROCEDURE — 36415 COLL VENOUS BLD VENIPUNCTURE: CPT

## 2024-03-19 PROCEDURE — 3074F SYST BP LT 130 MM HG: CPT | Performed by: FAMILY MEDICINE

## 2024-03-19 PROCEDURE — 3079F DIAST BP 80-89 MM HG: CPT | Performed by: FAMILY MEDICINE

## 2024-03-19 PROCEDURE — 87476 LYME DIS DNA AMP PROBE: CPT

## 2024-03-19 PROCEDURE — 99214 OFFICE O/P EST MOD 30 MIN: CPT | Performed by: FAMILY MEDICINE

## 2024-03-19 PROCEDURE — 1036F TOBACCO NON-USER: CPT | Performed by: FAMILY MEDICINE

## 2024-03-19 RX ORDER — PREDNISONE 10 MG/1
TABLET ORAL
Qty: 21 TABLET | Refills: 0 | Status: SHIPPED | OUTPATIENT
Start: 2024-03-19 | End: 2024-03-24

## 2024-03-19 RX ORDER — BUDESONIDE AND FORMOTEROL FUMARATE DIHYDRATE 160; 4.5 UG/1; UG/1
2 AEROSOL RESPIRATORY (INHALATION) EVERY 12 HOURS
COMMUNITY
Start: 2022-11-25 | End: 2024-05-28 | Stop reason: WASHOUT

## 2024-03-19 RX ORDER — DOXYCYCLINE 100 MG/1
100 CAPSULE ORAL 2 TIMES DAILY
Qty: 20 CAPSULE | Refills: 0 | Status: SHIPPED | OUTPATIENT
Start: 2024-03-19 | End: 2024-03-29

## 2024-03-19 RX ORDER — FLUTICASONE PROPIONATE 50 MCG
SPRAY, SUSPENSION (ML) NASAL
Qty: 48 ML | Refills: 3 | Status: SHIPPED | OUTPATIENT
Start: 2024-03-19 | End: 2024-04-30 | Stop reason: WASHOUT

## 2024-03-19 RX ORDER — SOLIFENACIN SUCCINATE 5 MG/1
5 TABLET, FILM COATED ORAL DAILY
COMMUNITY
End: 2024-05-28 | Stop reason: WASHOUT

## 2024-03-19 ASSESSMENT — PAIN SCALES - GENERAL: PAINLEVEL: 6

## 2024-03-19 ASSESSMENT — ENCOUNTER SYMPTOMS
DIFFICULTY URINATING: 0
ENDOCRINE NEGATIVE: 1
SHORTNESS OF BREATH: 0
NAUSEA: 0
DIARRHEA: 0
FEVER: 0
DIZZINESS: 0

## 2024-03-19 NOTE — PROGRESS NOTES
Subjective   Patient ID: Sinai Mcbride is a 59 y.o. female who presents for Sick Visit (Not feeling right).    Patient feels fatigue  Weakness and she does not feel herself  Recent coughing and nasal congestion and chest congestion  Arthritis       Review of Systems   Constitutional:  Negative for fever.        Also see HPI   Eyes:  Negative for visual disturbance.   Respiratory:  Negative for shortness of breath.    Cardiovascular:  Negative for chest pain.   Gastrointestinal:  Negative for diarrhea and nausea.   Endocrine: Negative.    Genitourinary:  Negative for difficulty urinating.   Skin:  Negative for rash.   Neurological:  Negative for dizziness.        No focal deficits   Psychiatric/Behavioral:  Negative for suicidal ideas.    All other systems reviewed and are negative.      Objective   /80   Pulse 72   Wt 103 kg (226 lb)   SpO2 94%   BMI 44.14 kg/m²     Physical Exam  Vitals and nursing note reviewed.   Constitutional:       Appearance: Normal appearance.   HENT:      Head: Normocephalic and atraumatic.   Eyes:      Extraocular Movements: Extraocular movements intact.      Conjunctiva/sclera: Conjunctivae normal.   Cardiovascular:      Rate and Rhythm: Normal rate and regular rhythm.      Heart sounds: Normal heart sounds.   Pulmonary:      Effort: Pulmonary effort is normal.      Breath sounds: Normal breath sounds.      Comments: Lungs essentially CTA b/l  Abdominal:      General: There is no distension.      Palpations: Abdomen is soft. There is no mass.      Tenderness: There is no abdominal tenderness.   Musculoskeletal:      Right lower leg: No edema.      Left lower leg: No edema.   Skin:     Coloration: Skin is not jaundiced.      Findings: No rash.   Neurological:      General: No focal deficit present.      Mental Status: She is alert and oriented to person, place, and time.   Psychiatric:         Mood and Affect: Mood normal.         Behavior: Behavior normal.         Thought  Content: Thought content normal.         Judgment: Judgment normal.       Assessment/Plan   Diagnoses and all orders for this visit:  Tired  -     Lyme disease, PCR; Future  -     Comprehensive Metabolic Panel; Future  -     Vitamin B12; Future  -     CBC and Auto Differential; Future  Seasonal allergic rhinitis due to pollen  -     fluticasone (Flonase) 50 mcg/actuation nasal spray; 2 SPRAYS IN EACH NOSTRIL ONCE A DAY AS NEEDED FOR ALLERGY SYMPTOMS NASALLY 30 DAY(S) 30 DAYS  -     Aerochamber Spacer Device  Bronchitis  -     predniSONE (Deltasone) 10 mg tablet; Take 6 tablets (60 mg) by mouth once daily for 1 day, THEN 5 tablets (50 mg) once daily for 1 day, THEN 4 tablets (40 mg) once daily for 1 day, THEN 3 tablets (30 mg) once daily for 1 day, THEN 2 tablets (20 mg) once daily for 1 day, THEN 1 tablet (10 mg) once daily for 1 day.  -     doxycycline (Vibramycin) 100 mg capsule; Take 1 capsule (100 mg) by mouth 2 times a day for 10 days. Take with at least 8 ounces (large glass) of water, do not lie down for 30 minutes after  Arthritis  -     Lyme disease, PCR; Future  -     Comprehensive Metabolic Panel; Future  -     Vitamin B12; Future  -     CBC and Auto Differential; Future  Shortness of breath  -     Aerochamber Spacer Device

## 2024-03-20 LAB — VIT B12 SERPL-MCNC: 382 PG/ML (ref 211–911)

## 2024-03-22 LAB
B BURGDOR DNA SPEC QL NAA+PROBE: NOT DETECTED
SPECIMEN SOURCE: NORMAL

## 2024-03-25 ENCOUNTER — APPOINTMENT (OUTPATIENT)
Dept: PRIMARY CARE | Facility: CLINIC | Age: 59
End: 2024-03-25
Payer: COMMERCIAL

## 2024-04-24 ENCOUNTER — APPOINTMENT (OUTPATIENT)
Dept: RADIOLOGY | Facility: HOSPITAL | Age: 59
End: 2024-04-24
Payer: COMMERCIAL

## 2024-04-24 ENCOUNTER — APPOINTMENT (OUTPATIENT)
Dept: CARDIOLOGY | Facility: HOSPITAL | Age: 59
End: 2024-04-24
Payer: COMMERCIAL

## 2024-04-24 ENCOUNTER — HOSPITAL ENCOUNTER (EMERGENCY)
Facility: HOSPITAL | Age: 59
Discharge: HOME | End: 2024-04-24
Attending: EMERGENCY MEDICINE
Payer: COMMERCIAL

## 2024-04-24 VITALS
OXYGEN SATURATION: 96 % | WEIGHT: 225.75 LBS | BODY MASS INDEX: 44.32 KG/M2 | HEART RATE: 62 BPM | RESPIRATION RATE: 16 BRPM | SYSTOLIC BLOOD PRESSURE: 111 MMHG | DIASTOLIC BLOOD PRESSURE: 68 MMHG | TEMPERATURE: 97 F | HEIGHT: 60 IN

## 2024-04-24 DIAGNOSIS — J44.1 COPD EXACERBATION (MULTI): ICD-10-CM

## 2024-04-24 DIAGNOSIS — R07.9 CHEST PAIN, UNSPECIFIED TYPE: Primary | ICD-10-CM

## 2024-04-24 LAB
ALBUMIN SERPL BCP-MCNC: 4.1 G/DL (ref 3.4–5)
ALP SERPL-CCNC: 51 U/L (ref 33–110)
ALT SERPL W P-5'-P-CCNC: 18 U/L (ref 7–45)
ANION GAP SERPL CALC-SCNC: 12 MMOL/L (ref 10–20)
AST SERPL W P-5'-P-CCNC: 16 U/L (ref 9–39)
ATRIAL RATE: 47 BPM
BASOPHILS # BLD AUTO: 0.12 X10*3/UL (ref 0–0.1)
BASOPHILS NFR BLD AUTO: 1.3 %
BILIRUB SERPL-MCNC: 0.5 MG/DL (ref 0–1.2)
BNP SERPL-MCNC: 55 PG/ML (ref 0–99)
BUN SERPL-MCNC: 10 MG/DL (ref 6–23)
CALCIUM SERPL-MCNC: 9.4 MG/DL (ref 8.6–10.3)
CARDIAC TROPONIN I PNL SERPL HS: 4 NG/L (ref 0–13)
CARDIAC TROPONIN I PNL SERPL HS: 4 NG/L (ref 0–13)
CHLORIDE SERPL-SCNC: 105 MMOL/L (ref 98–107)
CO2 SERPL-SCNC: 25 MMOL/L (ref 21–32)
CREAT SERPL-MCNC: 0.76 MG/DL (ref 0.5–1.05)
D DIMER PPP FEU-MCNC: 383 NG/ML FEU
EGFRCR SERPLBLD CKD-EPI 2021: >90 ML/MIN/1.73M*2
EOSINOPHIL # BLD AUTO: 2.8 X10*3/UL (ref 0–0.7)
EOSINOPHIL NFR BLD AUTO: 30 %
ERYTHROCYTE [DISTWIDTH] IN BLOOD BY AUTOMATED COUNT: 13.2 % (ref 11.5–14.5)
GLUCOSE SERPL-MCNC: 96 MG/DL (ref 74–99)
HCT VFR BLD AUTO: 43 % (ref 36–46)
HGB BLD-MCNC: 14.3 G/DL (ref 12–16)
IMM GRANULOCYTES # BLD AUTO: 0.01 X10*3/UL (ref 0–0.7)
IMM GRANULOCYTES NFR BLD AUTO: 0.1 % (ref 0–0.9)
LYMPHOCYTES # BLD AUTO: 2.76 X10*3/UL (ref 1.2–4.8)
LYMPHOCYTES NFR BLD AUTO: 29.6 %
MAGNESIUM SERPL-MCNC: 1.95 MG/DL (ref 1.6–2.4)
MCH RBC QN AUTO: 31.6 PG (ref 26–34)
MCHC RBC AUTO-ENTMCNC: 33.3 G/DL (ref 32–36)
MCV RBC AUTO: 95 FL (ref 80–100)
MONOCYTES # BLD AUTO: 0.73 X10*3/UL (ref 0.1–1)
MONOCYTES NFR BLD AUTO: 7.8 %
NEUTROPHILS # BLD AUTO: 2.91 X10*3/UL (ref 1.2–7.7)
NEUTROPHILS NFR BLD AUTO: 31.2 %
NRBC BLD-RTO: 0 /100 WBCS (ref 0–0)
P AXIS: 32 DEGREES
P OFFSET: 192 MS
P ONSET: 135 MS
PLATELET # BLD AUTO: 258 X10*3/UL (ref 150–450)
POTASSIUM SERPL-SCNC: 4 MMOL/L (ref 3.5–5.3)
PR INTERVAL: 152 MS
PROT SERPL-MCNC: 6.9 G/DL (ref 6.4–8.2)
Q ONSET: 211 MS
QRS COUNT: 8 BEATS
QRS DURATION: 86 MS
QT INTERVAL: 464 MS
QTC CALCULATION(BAZETT): 410 MS
QTC FREDERICIA: 427 MS
R AXIS: -18 DEGREES
RBC # BLD AUTO: 4.52 X10*6/UL (ref 4–5.2)
SODIUM SERPL-SCNC: 138 MMOL/L (ref 136–145)
T AXIS: 17 DEGREES
T OFFSET: 443 MS
VENTRICULAR RATE: 47 BPM
WBC # BLD AUTO: 9.3 X10*3/UL (ref 4.4–11.3)

## 2024-04-24 PROCEDURE — 36415 COLL VENOUS BLD VENIPUNCTURE: CPT | Performed by: EMERGENCY MEDICINE

## 2024-04-24 PROCEDURE — 71045 X-RAY EXAM CHEST 1 VIEW: CPT | Performed by: RADIOLOGY

## 2024-04-24 PROCEDURE — 93005 ELECTROCARDIOGRAM TRACING: CPT | Mod: 76

## 2024-04-24 PROCEDURE — 93005 ELECTROCARDIOGRAM TRACING: CPT

## 2024-04-24 PROCEDURE — 84484 ASSAY OF TROPONIN QUANT: CPT | Performed by: EMERGENCY MEDICINE

## 2024-04-24 PROCEDURE — 96374 THER/PROPH/DIAG INJ IV PUSH: CPT

## 2024-04-24 PROCEDURE — 85025 COMPLETE CBC W/AUTO DIFF WBC: CPT | Performed by: EMERGENCY MEDICINE

## 2024-04-24 PROCEDURE — 94664 DEMO&/EVAL PT USE INHALER: CPT

## 2024-04-24 PROCEDURE — 2500000002 HC RX 250 W HCPCS SELF ADMINISTERED DRUGS (ALT 637 FOR MEDICARE OP, ALT 636 FOR OP/ED): Mod: SE | Performed by: EMERGENCY MEDICINE

## 2024-04-24 PROCEDURE — 85379 FIBRIN DEGRADATION QUANT: CPT | Performed by: EMERGENCY MEDICINE

## 2024-04-24 PROCEDURE — 94760 N-INVAS EAR/PLS OXIMETRY 1: CPT

## 2024-04-24 PROCEDURE — 83735 ASSAY OF MAGNESIUM: CPT | Performed by: EMERGENCY MEDICINE

## 2024-04-24 PROCEDURE — 9420000001 HC RT PATIENT EDUCATION 5 MIN

## 2024-04-24 PROCEDURE — 83880 ASSAY OF NATRIURETIC PEPTIDE: CPT | Performed by: EMERGENCY MEDICINE

## 2024-04-24 PROCEDURE — 99284 EMERGENCY DEPT VISIT MOD MDM: CPT | Mod: 25

## 2024-04-24 PROCEDURE — 84075 ASSAY ALKALINE PHOSPHATASE: CPT | Performed by: EMERGENCY MEDICINE

## 2024-04-24 PROCEDURE — 71045 X-RAY EXAM CHEST 1 VIEW: CPT

## 2024-04-24 PROCEDURE — 2500000004 HC RX 250 GENERAL PHARMACY W/ HCPCS (ALT 636 FOR OP/ED): Mod: SE

## 2024-04-24 RX ORDER — IPRATROPIUM BROMIDE AND ALBUTEROL SULFATE 2.5; .5 MG/3ML; MG/3ML
3 SOLUTION RESPIRATORY (INHALATION) ONCE
Status: COMPLETED | OUTPATIENT
Start: 2024-04-24 | End: 2024-04-24

## 2024-04-24 RX ADMIN — METHYLPREDNISOLONE SODIUM SUCCINATE 125 MG: 125 INJECTION, POWDER, FOR SOLUTION INTRAMUSCULAR; INTRAVENOUS at 13:28

## 2024-04-24 RX ADMIN — IPRATROPIUM BROMIDE AND ALBUTEROL SULFATE 3 ML: 2.5; .5 SOLUTION RESPIRATORY (INHALATION) at 13:23

## 2024-04-24 ASSESSMENT — COLUMBIA-SUICIDE SEVERITY RATING SCALE - C-SSRS
2. HAVE YOU ACTUALLY HAD ANY THOUGHTS OF KILLING YOURSELF?: NO
6. HAVE YOU EVER DONE ANYTHING, STARTED TO DO ANYTHING, OR PREPARED TO DO ANYTHING TO END YOUR LIFE?: NO
1. IN THE PAST MONTH, HAVE YOU WISHED YOU WERE DEAD OR WISHED YOU COULD GO TO SLEEP AND NOT WAKE UP?: NO

## 2024-04-24 ASSESSMENT — PAIN - FUNCTIONAL ASSESSMENT: PAIN_FUNCTIONAL_ASSESSMENT: 0-10

## 2024-04-24 NOTE — ED PROVIDER NOTES
HPI   Chief Complaint   Patient presents with    Chest Pain     Pt reports right side chest pain intermittent- started this morning feels like a stabbing pain that radiates into her right arm, denies nausea, vomiting cough congestion        HPI  Patient is a 59-year-old female presenting to the ED today for chest pain.  Patient explains that she woke up this morning with right sided chest pain radiating into her right arm.  She then went to her psychiatry appointment and was talking to her therapist when she described this pain, and was recommended to come here to the ED for further evaluation.  Patient does have a history of asthma and states that she has recently been around her daughter a lot more who vapes.  She notes that since then, she has had to use her breathing treatments more frequently.  She otherwise denies any fever, cough, abdominal pain, vomiting, or changes in bowel or bladder habits.                  Terry Coma Scale Score: 15                     Patient History   Past Medical History:   Diagnosis Date    Anxiety     Asthma ([facility])     Migraine     Personal history of other diseases of the circulatory system 07/11/2022    History of hypertension     Past Surgical History:   Procedure Laterality Date    OTHER SURGICAL HISTORY  07/11/2022    Cholecystectomy    OTHER SURGICAL HISTORY  07/11/2022    Tonsillectomy     Family History   Problem Relation Name Age of Onset    Other (degenerative joint/disk disease) Mother      Hypertension Mother      Other (scolio) Mother      Leukemia Father      Hypertension Father      Cancer Father      Skin cancer Brother      Alcohol abuse Brother      No Known Problems Daughter      No Known Problems Daughter      No Known Problems Son      Colon cancer Maternal Grandmother      Lung cancer Maternal Grandfather      Heart disease Paternal Grandmother      Stroke Paternal Grandmother      Heart disease Paternal Grandfather      Hypertension Sibling      Cancer  Sibling       Social History     Tobacco Use    Smoking status: Former     Current packs/day: 0.00     Average packs/day: 0.5 packs/day for 15.0 years (7.5 ttl pk-yrs)     Types: Cigarettes     Start date: 2006     Quit date: 2021     Years since quittin.4    Smokeless tobacco: Never   Vaping Use    Vaping status: Never Used   Substance Use Topics    Alcohol use: Never    Drug use: Never       Physical Exam   ED Triage Vitals   Temperature Heart Rate Respirations BP   24 1211 24 1211 24 1211 24 1211   36.1 °C (97 °F) (!) 47 18 106/74      Pulse Ox Temp Source Heart Rate Source Patient Position   24 1211 24 1211 24 1211 --   97 % Temporal Monitor       BP Location FiO2 (%)     -- 24 1323      21 %       Physical Exam  Vitals and nursing note reviewed.   Constitutional:       General: She is not in acute distress.     Appearance: She is not toxic-appearing.   HENT:      Head: Normocephalic.      Mouth/Throat:      Mouth: Mucous membranes are moist.   Eyes:      Extraocular Movements: Extraocular movements intact.      Conjunctiva/sclera: Conjunctivae normal.   Cardiovascular:      Rate and Rhythm: Normal rate and regular rhythm.      Comments: Mild tenderness to palpation of the anterior chest wall  Pulmonary:      Effort: Pulmonary effort is normal. No respiratory distress.      Comments: Expiratory wheezing bilaterally  Abdominal:      General: There is no distension.      Palpations: Abdomen is soft.      Tenderness: There is no abdominal tenderness.   Musculoskeletal:         General: No swelling.      Cervical back: Neck supple.   Skin:     General: Skin is warm and dry.      Capillary Refill: Capillary refill takes less than 2 seconds.   Neurological:      General: No focal deficit present.      Mental Status: She is alert. Mental status is at baseline.         ED Course & MDM   ED Course as of 24 1432      1343 EKG obtained at  1159, interpreted by myself.  Sinus bradycardia with ventricular rate of 47, left axis deviation, normal intervals, with no acute ischemic changes [VT]      ED Course User Index  [VT] Lexie SARGENT MD         Diagnoses as of 04/24/24 1432   Chest pain, unspecified type   COPD exacerbation (Multi)       Medical Decision Making  Patient was seen and evaluated for chest pain.  Differential diagnosis includes but is not limited to ACS, PE, AAA, Unstable angina, Aortic Dissection, GERD, Trauma, Viral Infection, MSK Pain, Costochondritis, Chest wall pain, COPD, CHF.  Initial EKG does not show any acute ischemic changes.  Patient is placed on a cardiac monitor with continuous pulse ox.  Additional labs and imaging are ordered for further evaluation of the patient's symptoms.  On exam, patient does have expiratory wheezing bilaterally.  She is therefore administered Solu-Medrol 125 mg IV as well as a DuoNeb breathing treatment.  Lab work in the ED is unremarkable.  High-sensitivity troponin is normal at 4, with repeat still normal at 4.  XR chest 1 view   Final Result   1. No consolidation or edema. Linear scarring/atelectasis at the left   mid lung                  MACRO:   None        Signed by: Supa Lane 4/24/2024 1:30 PM   Dictation workstation:   [id number]      On reevaluation, patient is resting comfortably in bed. Patient was informed of their lab and imaging results, and all questions and concerns were answered. Discharge planning with close outpatient follow-up was discussed at this time, to which the patient was agreeable. Strict return precautions were given, and patient was discharged home in stable condition.    Procedure  Procedures     Lexie SARGENT MD  04/24/24 1434

## 2024-04-25 ENCOUNTER — HOSPITAL ENCOUNTER (EMERGENCY)
Facility: HOSPITAL | Age: 59
Discharge: LEFT BEFORE DISCHARGE | End: 2024-04-25
Attending: EMERGENCY MEDICINE
Payer: COMMERCIAL

## 2024-04-25 VITALS
WEIGHT: 224.87 LBS | OXYGEN SATURATION: 96 % | RESPIRATION RATE: 19 BRPM | HEIGHT: 60 IN | SYSTOLIC BLOOD PRESSURE: 135 MMHG | TEMPERATURE: 97.8 F | BODY MASS INDEX: 44.15 KG/M2 | DIASTOLIC BLOOD PRESSURE: 85 MMHG | HEART RATE: 59 BPM

## 2024-04-25 LAB
ATRIAL RATE: 65 BPM
P AXIS: 37 DEGREES
P OFFSET: 207 MS
P ONSET: 149 MS
PR INTERVAL: 148 MS
Q ONSET: 223 MS
QRS COUNT: 11 BEATS
QRS DURATION: 96 MS
QT INTERVAL: 426 MS
QTC CALCULATION(BAZETT): 443 MS
QTC FREDERICIA: 437 MS
R AXIS: -24 DEGREES
T AXIS: 34 DEGREES
T OFFSET: 436 MS
VENTRICULAR RATE: 65 BPM

## 2024-04-25 PROCEDURE — 99281 EMR DPT VST MAYX REQ PHY/QHP: CPT

## 2024-04-25 ASSESSMENT — PAIN SCALES - GENERAL: PAINLEVEL_OUTOF10: 0 - NO PAIN

## 2024-04-25 ASSESSMENT — PAIN - FUNCTIONAL ASSESSMENT: PAIN_FUNCTIONAL_ASSESSMENT: 0-10

## 2024-04-25 ASSESSMENT — PAIN DESCRIPTION - LOCATION: LOCATION: CHEST

## 2024-04-25 NOTE — ED PROVIDER NOTES
"HPI   Chief Complaint   Patient presents with    Chest Pain     Pt was seen in ED yesterday for Midsternal CP that radiates to R arm. Cardiac workup was negative, was told to follow up with PCP. Pt states still having pain today so she feels like they did not \"do the right tests\"     ED Course & [facility]   ED Course as of 04/25/24 1109   Thu Apr 25, 2024   1109 EKG performed at 1057.  Showing normal sinus rhythm incomplete right bundle branch ventricular rate of 65 no ST elevation depression ventricular rate of 65 interpreted by me. [KA]      ED Course User Index  [KA] Nicholas Gr, DO       This is a limited history and physical secondary to the patient eloping from the ER.    I walked into the room patient was very angry and upset and started yelling at me saying that no one will listen to her and the patient stood up took off her leads and walked out of the ED.      Nicholas Gr, DO  04/25/24 1115    "

## 2024-04-30 ENCOUNTER — OFFICE VISIT (OUTPATIENT)
Dept: PRIMARY CARE | Facility: CLINIC | Age: 59
End: 2024-04-30
Payer: COMMERCIAL

## 2024-04-30 VITALS
HEIGHT: 60 IN | HEART RATE: 62 BPM | DIASTOLIC BLOOD PRESSURE: 80 MMHG | OXYGEN SATURATION: 96 % | BODY MASS INDEX: 43.15 KG/M2 | SYSTOLIC BLOOD PRESSURE: 102 MMHG | TEMPERATURE: 98.1 F | WEIGHT: 219.8 LBS

## 2024-04-30 DIAGNOSIS — R07.9 CHEST PAIN, UNSPECIFIED TYPE: Primary | ICD-10-CM

## 2024-04-30 DIAGNOSIS — L29.9 ITCHING: ICD-10-CM

## 2024-04-30 PROCEDURE — 3079F DIAST BP 80-89 MM HG: CPT | Performed by: FAMILY MEDICINE

## 2024-04-30 PROCEDURE — 99213 OFFICE O/P EST LOW 20 MIN: CPT | Performed by: FAMILY MEDICINE

## 2024-04-30 PROCEDURE — 3074F SYST BP LT 130 MM HG: CPT | Performed by: FAMILY MEDICINE

## 2024-04-30 RX ORDER — VILOXAZINE HYDROCHLORIDE 200 MG/1
2 CAPSULE, EXTENDED RELEASE ORAL NIGHTLY
COMMUNITY
Start: 2024-04-14

## 2024-04-30 RX ORDER — HYDROXYZINE HYDROCHLORIDE 25 MG/1
25 TABLET, FILM COATED ORAL EVERY 6 HOURS PRN
Qty: 60 TABLET | Refills: 2 | Status: SHIPPED | OUTPATIENT
Start: 2024-04-30 | End: 2024-06-29

## 2024-04-30 ASSESSMENT — ENCOUNTER SYMPTOMS
NAUSEA: 0
OCCASIONAL FEELINGS OF UNSTEADINESS: 0
DEPRESSION: 0
DIARRHEA: 0
DIFFICULTY URINATING: 0
LOSS OF SENSATION IN FEET: 0
FEVER: 0
DIZZINESS: 0
ENDOCRINE NEGATIVE: 1
SHORTNESS OF BREATH: 0

## 2024-04-30 ASSESSMENT — PATIENT HEALTH QUESTIONNAIRE - PHQ9
1. LITTLE INTEREST OR PLEASURE IN DOING THINGS: NOT AT ALL
SUM OF ALL RESPONSES TO PHQ9 QUESTIONS 1 AND 2: 0
2. FEELING DOWN, DEPRESSED OR HOPELESS: NOT AT ALL

## 2024-04-30 ASSESSMENT — PAIN SCALES - GENERAL: PAINLEVEL: 5

## 2024-04-30 NOTE — PROGRESS NOTES
Subjective   Patient ID: Sinai Mcbride is a 59 y.o. female who presents for right side chest pain (Requesting referral for cardiology).    Chest pain-right side  itching         Review of Systems   Constitutional:  Negative for fever.        Also see HPI   Eyes:  Negative for visual disturbance.   Respiratory:  Negative for shortness of breath.    Cardiovascular:  Negative for chest pain.   Gastrointestinal:  Negative for diarrhea and nausea.   Endocrine: Negative.    Genitourinary:  Negative for difficulty urinating.   Skin:  Negative for rash.   Neurological:  Negative for dizziness.        No focal deficits   Psychiatric/Behavioral:  Negative for suicidal ideas.    All other systems reviewed and are negative.      Objective   /80   Pulse 62   Temp 36.7 °C (98.1 °F)   Ht 1.524 m (5')   Wt 99.7 kg (219 lb 12.8 oz)   SpO2 96%   BMI 42.93 kg/m²     Physical Exam  Vitals and nursing note reviewed.   Constitutional:       Appearance: Normal appearance.   HENT:      Head: Normocephalic and atraumatic.   Eyes:      Extraocular Movements: Extraocular movements intact.      Conjunctiva/sclera: Conjunctivae normal.   Cardiovascular:      Rate and Rhythm: Normal rate and regular rhythm.      Heart sounds: Normal heart sounds.   Pulmonary:      Effort: Pulmonary effort is normal.      Breath sounds: Normal breath sounds.      Comments: Lungs essentially CTA b/l  Abdominal:      General: There is no distension.      Palpations: Abdomen is soft. There is no mass.      Tenderness: There is no abdominal tenderness.   Musculoskeletal:      Right lower leg: No edema.      Left lower leg: No edema.   Skin:     Coloration: Skin is not jaundiced.      Findings: No rash.   Neurological:      General: No focal deficit present.      Mental Status: She is alert and oriented to person, place, and time.   Psychiatric:         Mood and Affect: Mood normal.         Behavior: Behavior normal.         Thought Content: Thought content  normal.         Judgment: Judgment normal.         Assessment/Plan   Diagnoses and all orders for this visit:  Chest pain, unspecified type  -     Referral to Cardiology; Future  Itching  -     hydrOXYzine HCL (Atarax) 25 mg tablet; Take 1 tablet (25 mg) by mouth every 6 hours if needed for itching or allergies.

## 2024-05-16 ENCOUNTER — TELEMEDICINE (OUTPATIENT)
Dept: PRIMARY CARE | Facility: CLINIC | Age: 59
End: 2024-05-16
Payer: COMMERCIAL

## 2024-05-16 DIAGNOSIS — D72.10 EOSINOPHILIA, UNSPECIFIED TYPE: Primary | ICD-10-CM

## 2024-05-16 DIAGNOSIS — F41.9 ANXIETY: ICD-10-CM

## 2024-05-16 PROCEDURE — 99214 OFFICE O/P EST MOD 30 MIN: CPT | Performed by: NURSE PRACTITIONER

## 2024-05-16 ASSESSMENT — ENCOUNTER SYMPTOMS
LIGHT-HEADEDNESS: 0
DIZZINESS: 0
RESPIRATORY NEGATIVE: 1
FATIGUE: 1

## 2024-05-16 NOTE — PROGRESS NOTES
Subjective   Patient ID: Sinai Mcbride is a 59 y.o. female who presents for No chief complaint on file..  Wants referral to allergy.  Frequent issues with chest pain, recent ED visit, sees PCP, has appointment with cardiology next week.  Therapist told [facility] to determine what is happening and what needs follow up.  Reports has d/w PCP, unclear response.  Does not understand why needs to see cardiology when ED visit unremarkable for acute event.  Unclear hx re: allergic responses but some  eos elevated.  No acute concerns today, frustrated re; not having answers to why she doesn't feel well.          Review of Systems   Constitutional:  Positive for fatigue.   Respiratory: Negative.     Cardiovascular:  Positive for chest pain. Negative for leg swelling.   Neurological:  Negative for dizziness and light-headedness.       Objective   Physical Exam  Constitutional:       General: She is not in acute distress.     Appearance: She is not ill-appearing or toxic-appearing.   HENT:      Nose: No congestion or rhinorrhea.   Eyes:      Extraocular Movements: Extraocular movements intact.   Pulmonary:      Effort: Pulmonary effort is normal.   Musculoskeletal:      Cervical back: Neck supple.   Neurological:      Mental Status: She is oriented to person, place, and time.         Assessment/Plan   Diagnoses and all orders for this visit:  Eosinophilia, unspecified type  -     Referral to Allergy; Future  Anxiety           VIVIENNE Hutton 05/16/24 11:08 AM

## 2024-05-16 NOTE — ASSESSMENT & PLAN NOTE
Reports has seen GI, oncology, liver specialist for various concerns.  Requesting LISW @  to help with her appointments, etc - had one thru  [employer] but recently moved out of position.  Recommend d/w Dr Zarate as he is PCP and best positioned to coordinate care and interpret within context.  Reassured that ED visit showed no acute concerns but that cardiology may order stress test, ECHO etc if not already completed, encouraged to follow up.

## 2024-05-21 ENCOUNTER — HOSPITAL ENCOUNTER (OUTPATIENT)
Dept: CARDIOLOGY | Facility: HOSPITAL | Age: 59
Discharge: HOME | End: 2024-05-21
Payer: COMMERCIAL

## 2024-05-21 ENCOUNTER — OFFICE VISIT (OUTPATIENT)
Dept: CARDIOLOGY | Facility: CLINIC | Age: 59
End: 2024-05-21
Payer: COMMERCIAL

## 2024-05-21 VITALS
HEART RATE: 55 BPM | WEIGHT: 215 LBS | SYSTOLIC BLOOD PRESSURE: 135 MMHG | BODY MASS INDEX: 42.21 KG/M2 | DIASTOLIC BLOOD PRESSURE: 88 MMHG | HEIGHT: 60 IN | OXYGEN SATURATION: 94 %

## 2024-05-21 DIAGNOSIS — R94.31 ABNORMAL EKG: ICD-10-CM

## 2024-05-21 DIAGNOSIS — R06.02 SHORTNESS OF BREATH: ICD-10-CM

## 2024-05-21 DIAGNOSIS — R07.9 CHEST PAIN, UNSPECIFIED TYPE: Primary | ICD-10-CM

## 2024-05-21 DIAGNOSIS — M94.0 COSTOCHONDRITIS: ICD-10-CM

## 2024-05-21 DIAGNOSIS — I10 PRIMARY HYPERTENSION: ICD-10-CM

## 2024-05-21 DIAGNOSIS — R07.9 CHEST PAIN, UNSPECIFIED TYPE: ICD-10-CM

## 2024-05-21 DIAGNOSIS — E78.2 MIXED HYPERLIPIDEMIA: ICD-10-CM

## 2024-05-21 PROBLEM — E66.01 CLASS 3 SEVERE OBESITY WITH BODY MASS INDEX (BMI) OF 40.0 TO 44.9 IN ADULT (MULTI): Status: ACTIVE | Noted: 2023-07-07

## 2024-05-21 PROBLEM — E66.813 CLASS 3 SEVERE OBESITY WITH BODY MASS INDEX (BMI) OF 40.0 TO 44.9 IN ADULT: Status: ACTIVE | Noted: 2023-07-07

## 2024-05-21 PROCEDURE — 99204 OFFICE O/P NEW MOD 45 MIN: CPT | Performed by: NURSE PRACTITIONER

## 2024-05-21 PROCEDURE — 93005 ELECTROCARDIOGRAM TRACING: CPT

## 2024-05-21 PROCEDURE — 3079F DIAST BP 80-89 MM HG: CPT | Performed by: NURSE PRACTITIONER

## 2024-05-21 PROCEDURE — 3075F SYST BP GE 130 - 139MM HG: CPT | Performed by: NURSE PRACTITIONER

## 2024-05-21 RX ORDER — REGADENOSON 0.08 MG/ML
0.4 INJECTION, SOLUTION INTRAVENOUS
Status: CANCELLED | OUTPATIENT
Start: 2024-05-21

## 2024-05-21 RX ORDER — NAPROXEN 500 MG/1
500 TABLET ORAL
Qty: 14 TABLET | Refills: 0 | Status: SHIPPED | OUTPATIENT
Start: 2024-05-21 | End: 2024-05-28

## 2024-05-21 NOTE — PATIENT INSTRUCTIONS
Aleve -- 500 mg prescription or (2) 220 mg OTC tablets morning and evening scheduled for 1 week  GI acid medicine Pantoprazole 40 mg once daily x2 weeks     Scheduled cardiac testing   F/U 4 weeks to review symptoms and test results

## 2024-05-21 NOTE — PROGRESS NOTES
Primary Care Physician: Clement Zarate DO  Primary Cardiologist:      Date of Visit: 05/21/2024  1:20 PM EDT  Location of visit:  [facility]   Type of Visit: New Patient        Chief Complaint   Patient presents with    New Patient Visit     Here for chest pains and low heart rate  pain in right arm and chest feels bruised when touched,  last a couple days          HPI / Summary:   Sinai Mcbride is a 59 y.o. female who presents to establish cardiac care   Past medical history significant for HTN,  morbid obesity BMI 41, COPD, chronic pain           Sharp stabbing, like a cramp,  starts slowly, gradually gets worse down into the right arm resolves slowly   Had similar discomfort both times she went to the ER   March 2023 similar episode, treated by PCP .  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 GMA- CVA    Quit smoking in 2021, uses THC gummies, social ETOH        12 system review is negative except as noted above   Accompanied by daughter Shireen who contributed to the history      Medical History:   Past Medical History:   Diagnosis Date    Anxiety     Asthma ([facility])     Migraine     Personal history of other diseases of the circulatory system 07/11/2022    History of hypertension       Surgical History:   Past Surgical History:   Procedure Laterality Date    OTHER SURGICAL HISTORY  07/11/2022    Cholecystectomy    OTHER SURGICAL HISTORY  07/11/2022    Tonsillectomy       Family History:   Family History   Problem Relation Name Age of Onset    Other (degenerative joint/disk disease) Mother      Hypertension Mother      Other (scolio) Mother      Leukemia Father      Hypertension Father       Cancer Father      Skin cancer Brother      Alcohol abuse Brother      No Known Problems Daughter      No Known Problems Daughter      No Known Problems Son      Colon cancer Maternal Grandmother      Lung cancer Maternal Grandfather      Heart disease Paternal Grandmother      Stroke Paternal Grandmother      Heart disease Paternal Grandfather      Hypertension Sibling      Cancer Sibling         Social History:   Tobacco Use: Medium Risk (5/21/2024)    Patient History     Smoking Tobacco Use: Former     Smokeless Tobacco Use: Never     Passive Exposure: Not on file             MEDICATIONS:   Current Outpatient Medications   Medication Instructions    albuterol 90 mcg/actuation inhaler 2 puffs, inhalation, As needed    EPINEPHrine (EPIPEN) 0.3 mg, intramuscular, As needed, Call 911 after use.    etodolac (LODINE) 400 mg, oral, 2 times daily PRN, With food    hydrOXYzine HCL (ATARAX) 25 mg, oral, Every 6 hours PRN    LOSARTAN-HYDROCHLOROTHIAZIDE ORAL 1 tablet, oral, Daily    methocarbamol (ROBAXIN) 500 mg, oral, 3 times daily PRN    mometasone-formoterol (Dulera) 200-5 mcg/actuation inhaler 2 puffs, inhalation, 2 times daily    naproxen (EC-NAPROSYN) 500 mg, oral, 2 times daily (morning and late afternoon), Do not crush, chew, or split.    pantoprazole (PROTONIX) 40 mg, oral, Daily RT    Qelbree 200 mg capsule,extended release 24hr 2 capsules, oral, Nightly         IMAGING REVIEWED:   Echocardiogram: No results found for this or any previous visit from the past 1825 days.    Stress Testing: No results found for this or any previous visit from the past 1825 days.    Cardiac Catheterization: No results found for this or any previous visit from the past 1825 days.    Cardiac Scoring: No results found for this or any previous visit from the past 1825 days.    AAA : No results found for this or any previous visit from the past 1825 days.    OTHER: No results found for this or any previous visit from the past 1825  "days.        LABS:  CBC:   Lab Results   Component Value Date    WBC 9.3 04/24/2024    RBC 4.52 04/24/2024    HGB 14.3 04/24/2024    HCT 43.0 04/24/2024    MCV 95 04/24/2024    MCH 31.6 04/24/2024    MCHC 33.3 04/24/2024    RDW 13.2 04/24/2024     04/24/2024     CBC with Differential:    Lab Results   Component Value Date    WBC 9.3 04/24/2024    RBC 4.52 04/24/2024    HGB 14.3 04/24/2024    HCT 43.0 04/24/2024     04/24/2024    MCV 95 04/24/2024    MCH 31.6 04/24/2024    MCHC 33.3 04/24/2024    RDW 13.2 04/24/2024    NRBC 0.0 04/24/2024    LYMPHOPCT 29.6 04/24/2024    MONOPCT 7.8 04/24/2024    EOSPCT 30.0 04/24/2024    BASOPCT 1.3 04/24/2024    MONOSABS 0.73 04/24/2024    LYMPHSABS 2.76 04/24/2024    EOSABS 2.80 (H) 04/24/2024    BASOSABS 0.12 (H) 04/24/2024     CMP:    Lab Results   Component Value Date     04/24/2024    K 4.0 04/24/2024     04/24/2024    CO2 25 04/24/2024    BUN 10 04/24/2024    CREATININE 0.76 04/24/2024    GLUCOSE 96 04/24/2024    PROT 6.9 04/24/2024    CALCIUM 9.4 04/24/2024    BILITOT 0.5 04/24/2024    ALKPHOS 51 04/24/2024    AST 16 04/24/2024    ALT 18 04/24/2024     BMP:    Lab Results   Component Value Date     04/24/2024    K 4.0 04/24/2024     04/24/2024    CO2 25 04/24/2024    BUN 10 04/24/2024    CREATININE 0.76 04/24/2024    CALCIUM 9.4 04/24/2024    GLUCOSE 96 04/24/2024     Magnesium:  Lab Results   Component Value Date    MG 1.95 04/24/2024     Troponin:    Lab Results   Component Value Date    TROPHS 4 04/24/2024    TROPHS 4 04/24/2024     BNP:   Lab Results   Component Value Date    BNP 55 04/24/2024       Lipid Panel:  No results found for: \"HDL\", \"CHHDL\", \"VLDL\", \"TRIG\", \"NHDL\"     Lab work and imaging results independently reviewed by me         Visit Vitals  /88   Pulse 55   Ht 1.524 m (5')   Wt 97.5 kg (215 lb)   SpO2 94%   BMI 41.99 kg/m²   OB Status Postmenopausal   Smoking Status Former   BSA 2.03 m²          ECG dated " 5/21/2024 independently reviewed   SB, incomplete RBBB  inferior TWI        Constitutional:       Appearance: Healthy appearance. Not in distress. Morbidly obese.   Eyes:      Conjunctiva/sclera: Conjunctivae normal.   Neck:      Vascular: JVD normal.   Pulmonary:      Effort: Pulmonary effort is normal.      Breath sounds: Normal breath sounds.   Cardiovascular:      PMI at left midclavicular line. Normal rate. Regular rhythm. Normal S1. Normal S2.       Murmurs: There is no murmur.      No rub.   Pulses:     Intact distal pulses.   Edema:     Peripheral edema absent.   Abdominal:      General: Bowel sounds are normal.   Musculoskeletal:      Cervical back: Neck supple. Skin:     General: Skin is warm and dry.   Neurological:      Mental Status: Alert and oriented to person, place and time.               Problem List Items Addressed This Visit             ICD-10-CM    Hypertension I10    Relevant Orders    Nuclear Stress Test    Chest pain - Primary R07.9    Relevant Orders    ECG 12 lead (Ancillary Performed) (Completed)    Nuclear Stress Test    Transthoracic echo (TTE) complete     Other Visit Diagnoses         Codes    Mixed hyperlipidemia     E78.2    Relevant Orders    CT cardiac scoring wo IV contrast    Transthoracic echo (TTE) complete    Shortness of breath     R06.02    Relevant Orders    Transthoracic echo (TTE) complete    Abnormal EKG     R94.31    Relevant Orders    Nuclear Stress Test    Transthoracic echo (TTE) complete    Costochondritis     M94.0    Relevant Medications    naproxen (EC-Naprosyn) 500 mg EC tablet            Pharm / MPI stress testing with ASCVD risk factors and atypical chest discomfort -- she is unable to walk on a treadmill due to chronic pain      LDL 93 not on a statin     Will also Treat with a course of NSAID / PPI for costochondritis    05/28/24 at 12:15 PM - MARLY Garcia-CNP        Followup Appts:  Future Appointments   Date Time Provider Department Center   6/5/2024  10:45 AM GEN NM ADMIN ROOM 1 GENNM [facility]    6/5/2024 11:15 AM GEN NM 1 GENNM [facility]    6/5/2024 11:45 AM GEN NUC STRESS LAB GENNIC1 GEN [facility]   6/5/2024 12:15 PM GEN NM ADMIN ROOM 1 GENNM GEN [facility]   6/5/2024 12:30 PM GEN NM 1 GENNM GEN [facility]   6/5/2024  2:00 PM GEN ECHO GENNIC1 GEN [facility]   6/10/2024  8:30 AM Darlin Victoria, PT GENPT [facility]   6/24/2024  9:00 AM GEN CT 1 GENCT GEN [facility]   6/25/2024  1:00 PM Annalee Downs APRN-CNP [id number] [place]   9/25/2024  1:00 PM Eleuterio Boyle MD [id number] [facility]

## 2024-05-23 LAB
ATRIAL RATE: 49 BPM
P AXIS: 53 DEGREES
P OFFSET: 209 MS
P ONSET: 153 MS
PR INTERVAL: 146 MS
Q ONSET: 226 MS
QRS COUNT: 8 BEATS
QRS DURATION: 90 MS
QT INTERVAL: 470 MS
QTC CALCULATION(BAZETT): 424 MS
QTC FREDERICIA: 439 MS
R AXIS: -19 DEGREES
T AXIS: 10 DEGREES
T OFFSET: 461 MS
VENTRICULAR RATE: 49 BPM

## 2024-05-28 PROBLEM — R07.9 CHEST PAIN: Status: ACTIVE | Noted: 2024-05-28

## 2024-06-05 ENCOUNTER — HOSPITAL ENCOUNTER (OUTPATIENT)
Dept: RADIOLOGY | Facility: HOSPITAL | Age: 59
Discharge: HOME | End: 2024-06-05
Payer: COMMERCIAL

## 2024-06-05 ENCOUNTER — HOSPITAL ENCOUNTER (OUTPATIENT)
Dept: CARDIOLOGY | Facility: HOSPITAL | Age: 59
Discharge: HOME | End: 2024-06-05
Payer: COMMERCIAL

## 2024-06-05 ENCOUNTER — APPOINTMENT (OUTPATIENT)
Dept: CARDIOLOGY | Facility: HOSPITAL | Age: 59
End: 2024-06-05
Payer: COMMERCIAL

## 2024-06-05 DIAGNOSIS — R94.31 ABNORMAL EKG: ICD-10-CM

## 2024-06-05 DIAGNOSIS — R07.9 CHEST PAIN, UNSPECIFIED TYPE: ICD-10-CM

## 2024-06-05 DIAGNOSIS — I10 PRIMARY HYPERTENSION: ICD-10-CM

## 2024-06-05 PROCEDURE — 78452 HT MUSCLE IMAGE SPECT MULT: CPT | Performed by: STUDENT IN AN ORGANIZED HEALTH CARE EDUCATION/TRAINING PROGRAM

## 2024-06-05 PROCEDURE — A9502 TC99M TETROFOSMIN: HCPCS | Mod: SE | Performed by: NURSE PRACTITIONER

## 2024-06-05 PROCEDURE — 93017 CV STRESS TEST TRACING ONLY: CPT

## 2024-06-05 PROCEDURE — 3430000001 HC RX 343 DIAGNOSTIC RADIOPHARMACEUTICALS: Mod: SE | Performed by: NURSE PRACTITIONER

## 2024-06-05 PROCEDURE — 78452 HT MUSCLE IMAGE SPECT MULT: CPT

## 2024-06-05 PROCEDURE — 2500000004 HC RX 250 GENERAL PHARMACY W/ HCPCS (ALT 636 FOR OP/ED): Mod: SE | Performed by: NURSE PRACTITIONER

## 2024-06-05 RX ORDER — REGADENOSON 0.08 MG/ML
0.4 INJECTION, SOLUTION INTRAVENOUS
Status: COMPLETED | OUTPATIENT
Start: 2024-06-05 | End: 2024-06-05

## 2024-06-05 RX ADMIN — TETROFOSMIN 11 MILLICURIE: 0.23 INJECTION, POWDER, LYOPHILIZED, FOR SOLUTION INTRAVENOUS at 11:25

## 2024-06-05 RX ADMIN — TETROFOSMIN 35.8 MILLICURIE: 0.23 INJECTION, POWDER, LYOPHILIZED, FOR SOLUTION INTRAVENOUS at 12:06

## 2024-06-05 RX ADMIN — REGADENOSON 0.4 MG: 0.08 INJECTION, SOLUTION INTRAVENOUS at 12:05

## 2024-06-10 ENCOUNTER — EVALUATION (OUTPATIENT)
Dept: PHYSICAL THERAPY | Facility: HOSPITAL | Age: 59
End: 2024-06-10
Payer: COMMERCIAL

## 2024-06-10 DIAGNOSIS — M54.2 CERVICALGIA: Primary | ICD-10-CM

## 2024-06-10 DIAGNOSIS — M54.6 PAIN IN THORACIC SPINE: ICD-10-CM

## 2024-06-10 PROCEDURE — 97162 PT EVAL MOD COMPLEX 30 MIN: CPT | Mod: GP | Performed by: PHYSICAL THERAPIST

## 2024-06-10 PROCEDURE — 97110 THERAPEUTIC EXERCISES: CPT | Mod: GP | Performed by: PHYSICAL THERAPIST

## 2024-06-10 SDOH — ECONOMIC STABILITY: FOOD INSECURITY: WITHIN THE PAST 12 MONTHS, YOU WORRIED THAT YOUR FOOD WOULD RUN OUT BEFORE YOU GOT MONEY TO BUY MORE.: NEVER TRUE

## 2024-06-10 SDOH — ECONOMIC STABILITY: FOOD INSECURITY: WITHIN THE PAST 12 MONTHS, THE FOOD YOU BOUGHT JUST DIDN'T LAST AND YOU DIDN'T HAVE MONEY TO GET MORE.: NEVER TRUE

## 2024-06-10 ASSESSMENT — ENCOUNTER SYMPTOMS
OCCASIONAL FEELINGS OF UNSTEADINESS: 0
DEPRESSION: 1
LOSS OF SENSATION IN FEET: 0

## 2024-06-10 ASSESSMENT — PATIENT HEALTH QUESTIONNAIRE - PHQ9
SUM OF ALL RESPONSES TO PHQ9 QUESTIONS 1 AND 2: 2
1. LITTLE INTEREST OR PLEASURE IN DOING THINGS: SEVERAL DAYS
2. FEELING DOWN, DEPRESSED OR HOPELESS: SEVERAL DAYS

## 2024-06-10 ASSESSMENT — LIFESTYLE VARIABLES
AUDIT-C TOTAL SCORE: 1
HOW OFTEN DO YOU HAVE SIX OR MORE DRINKS ON ONE OCCASION: NEVER
HOW MANY STANDARD DRINKS CONTAINING ALCOHOL DO YOU HAVE ON A TYPICAL DAY: 1 OR 2
SKIP TO QUESTIONS 9-10: 1
HOW OFTEN DO YOU HAVE A DRINK CONTAINING ALCOHOL: MONTHLY OR LESS

## 2024-06-10 ASSESSMENT — PAIN SCALES - GENERAL: PAINLEVEL_OUTOF10: 5 - MODERATE PAIN

## 2024-06-10 NOTE — PROGRESS NOTES
Physical Therapy  Physical Therapy Orthopedic Evaluation    Patient Name: Sinai Mcbride  MRN: [id number]  Encounter Date: 6/10/2024  Time Calculation  Start Time: 0830  Stop Time: 0917  Time Calculation (min): 47 min    PT Evaluation Time Entry  PT Evaluation (Moderate) Time Entry: 30    Insurance:  Visit number: 1 of 1  Authorization info: Auth required  Payor: [employer] / Plan: [employer] / Product Type: *No Product type* /     Current Problem  Problem List Items Addressed This Visit             ICD-10-CM    Cervicalgia - Primary M54.2    Relevant Orders    Follow Up In Physical Therapy    Pain in thoracic spine M54.6    Relevant Orders    Follow Up In Physical Therapy     1. Cervicalgia  Referral to Physical Therapy    Follow Up In Physical Therapy      2. Pain in thoracic spine  Referral to Physical Therapy    Follow Up In Physical Therapy          General:  General  Reason for Referral: R chest pain  Referred By: Anival King  Past Medical History Relevant to Rehab: h/o LBP, enlarged liver  Preferred Learning Style: written      Precautions:   Precautions  STEADI Fall Risk Score (The score of 4 or more indicates an increased risk of falling): 7  Medical Precautions: Fall precautions    Medical History Form: Reviewed (scanned into chart)    Subjective:   Subjective   Chief Complaint: Patient presents to clinic with R sided pec./chest pain starting Spring of 2023. The patient has intermittent tingling into the B hands as well. The patient has seen oncology, pain management, and cardiology. She was advised that her pain is musculoskeletal in nature and needs to see PT to address this. The patient has a h/o R liver enlargement, which patient believes may be contributing to her current symptoms.   Onset Date: 3/05/2024  GRACY: Insidious    Current Condition:   Same    Pain:  Pain Score: 5 - Moderate pain  Highest: 9/10 pain  Lowest: 3/10 pain  Location: R pecs/anterior shoulder  Description: throbbing, stabbing, NT  "into B hands  Aggravating Factors:  Comes and goes with no particular mechanism  Relieving Factors:  Rest    Relevant Information (PMH & Previous Tests/Imaging): CT of abdomen- enlarged liver, x-ray R shoulder (-) for fracture or degenerative changes  Previous Interventions/Treatments: Physical Therapy    Prior Level of Function (PLOF)  Patient previously independent with all ADLs  Exercise/Physical Activity: low back exercises  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 No concerns.    Primary Language: English    Patient's Goal(s) for Therapy: Be pain free    Red Flags: Do you have any of the following? No  Fever/chills, unexplained weight changes, dizziness/fainting, unexplained change in bowel or bladder functions, unexplained malaise or muscle weakness, night pain/sweats, numbness or tingling    Objective:  Objective          ROM    Cervical AROM (Degrees)    Flexion: 45  Extension: 35  (L) Side Bend: 13  (R) Side Bend: 18  (L) Rotation: 45  (R) Rotation: 60      Shoulder AROM (Degrees)      (R)  (L)  Flexion: WFL  WFL         Strength Testing    Shoulder    (R)  (L)  Flexion: 4+/5  4+/5     Abduction: 4+/5  4+/5    ER:  4/5  4/5    IR:  4+/5  4+/5        Posture: shoulder rounded forward and head forward    Palpation: (+) TTP R pecs, R proximal biceps tendon    Joint Mobility: Patient declined testing        Special Tests    Spurlings Test: (-)  Radicular Symptoms: (+) B UE    RTC/Impingement   Neer Impingement: (-)   Flores Savage: (-)   Empty Can: (-)   Drop Arm: (-)    Patient did not fully participate in Objective portion of evaluation due to \"already seeing 5 other specialists\". The patient states that she is unable to differentiate the pain when the PT attempted to palpate. The patient states \"I'm not a doctor. I don't know what's going on. You figure it out\" The PT asked the patient for descriptors of her pain. " "Patient unable to provide.     Outcome Measures:        EDUCATION:   Individual(s) Educated: Patient   Education Provided: Home exercise program, plan of care, activity modifications, pain management, and injury pathology  Handout(s) Provided: Scanned into chart  Home Program: See below treatment  Risk and Benefits Discussed with Patient/Caregiver/Other: Yes   Patient/Caregiver Demonstrated Understanding: No   Plan of Care Discussed and Agreed Upon: Yes   Patient Response to Education:  Patient agreeable to trial exercise program.    Assessment: Patient presents with signs and symptoms consistent with cervicothoracic pain, resulting in limited participation in pain-free ADLs and inability to perform at their prior level of function. Patient has TTP at the R pecs and R biceps. PT unable to reproduce N/T. PT to do a trial of PT and then return back to referring provider if no change in symptoms due to h/o enlarged liver, which may be contributing to her symptoms. The patient did an initial PT evaluation at [facility] a few months ago. The evaluating therapist did not recommend PT at that time. Patient was further evaluated by cardiology and instructed to return back to PT. Skilled PT warranted to address the above stated impairments, so the patient can perform FA's without increased pain or difficulty.    Patient declined to answer additional questions and participate in most of the objective portion. PT had witness Mesfin Chowdhury, PT as well in the evaluation room due to patient becoming visibly upset when evaluating PT asked questions in regards to the patient's symptoms. The patient stated \"You aren't listening to me. I have had to answer your questions on paper and now in person.\" PT explained that the forms were for medical history, and PT needed further information to provide a thorough evaluation. Patient declined to provide further information but still wanted to try exercises to address her symptoms.   PT Assessment " Results: Decreased strength, Decreased range of motion, Decreased mobility  Rehab Prognosis: Fair  Evaluation/Treatment Tolerance: Treatment limited secondary to agitation  Barriers to Participation: Insight into problems    Clinical Presentation: Evolving with changing characteristics    Complexity: Moderate    Plan:  Treatment/Interventions: Education/ Instruction, Hot pack, Cryotherapy, Manual therapy, Neuromuscular re-education, Therapeutic activities, Therapeutic exercises  PT Plan: Skilled PT  PT Frequency: 2 times per week  Duration: 4 weeks  Onset Date: 03/05/24  Certification Period Start Date: 06/10/24  Certification Period End Date:  (Requires auth)  Number of Treatments Authorized: 1 of 1  Rehab Potential: Fair  Plan of Care Agreement: Patient    Goals: Set and discussed today  Active       PT Problem       Patient will be independent with HEP.        Start:  06/10/24    Expected End:  06/24/24            Patient will score </= 6% on NDI to improve her ability to perform FA's.        Start:  06/10/24    Expected End:  07/08/24            Patient will demonstrate an improvement of at least 5 degrees in all directions with cervical AROM in order to improve her ability to perform self care and FA's.       Start:  06/10/24    Expected End:  07/08/24            Patient will demonstrate 5/5 strength with R shoulder MMT to improve her ability to perform functional lifting and reaching.       Start:  06/10/24    Expected End:  07/08/24            Patient will report no >/= 2/10 pain in her R pec region with overhead reaching.        Start:  06/10/24    Expected End:  07/08/24                Plan of care was developed with input and agreement by the patient      Treatment Performed:  Seated Chin Tucks x 10, 2 x per day  Seated scapular retractions x 10, 2 x per day  Doorway medium height pec stretch 3 x 30 sec, 2 x per day      Darlin Victoria, PT

## 2024-06-14 DIAGNOSIS — R06.02 SHORTNESS OF BREATH: ICD-10-CM

## 2024-06-14 DIAGNOSIS — R94.31 ABNORMAL EKG: ICD-10-CM

## 2024-06-14 DIAGNOSIS — R07.9 CHEST PAIN, UNSPECIFIED TYPE: ICD-10-CM

## 2024-06-17 ENCOUNTER — TREATMENT (OUTPATIENT)
Dept: PHYSICAL THERAPY | Facility: HOSPITAL | Age: 59
End: 2024-06-17
Payer: COMMERCIAL

## 2024-06-17 DIAGNOSIS — M54.6 PAIN IN THORACIC SPINE: ICD-10-CM

## 2024-06-17 DIAGNOSIS — M54.2 CERVICALGIA: ICD-10-CM

## 2024-06-17 PROCEDURE — 97140 MANUAL THERAPY 1/> REGIONS: CPT | Mod: GP,CQ

## 2024-06-17 PROCEDURE — 97110 THERAPEUTIC EXERCISES: CPT | Mod: GP,CQ

## 2024-06-17 ASSESSMENT — PAIN SCALES - GENERAL: PAINLEVEL_OUTOF10: 6

## 2024-06-17 ASSESSMENT — PAIN - FUNCTIONAL ASSESSMENT: PAIN_FUNCTIONAL_ASSESSMENT: 0-10

## 2024-06-17 NOTE — PROGRESS NOTES
Physical Therapy Treatment    Patient Name: Sinai Mcbride  MRN: [id number]  Today's Date: 6/17/2024  Time Calculation  Start Time: 0730  Stop Time: 0810  Time Calculation (min): 40 min        PT Therapeutic Procedures Time Entry  Manual Therapy Time Entry: 15  Therapeutic Exercise Time Entry: 25                Insurance:  Visit number: 2 of 8  Authorization info: 8 authorized   Insurance Type: [employer]          Current Problem  1. Cervicalgia  Follow Up In Physical Therapy      2. Pain in thoracic spine  Follow Up In Physical Therapy          General  Reason for Referral: R chest pain  Referred By: Anival King  Past Medical History Relevant to Rehab: h/o LBP, enlarged liver      Subjective   Current Condition:   Worse  Patient reports her pain is progressively getting worse with no known triggers. Occasionally wakes up in the middle of the night with N/T in B hands.     Performing HEP?: Yes, but causes pain     Precautions  Precautions  Medical Precautions: Fall precautions  Pain  Pain Assessment: 0-10  Pain Score: 6  Pain Location: Chest  Pain Orientation: Right      Treatments:    Therapeutic Exercise  Therapeutic Exercise Performed: Yes  Therapeutic Exercise Activity 1: Posterior capsule stretch R 3x20 sec  Therapeutic Exercise Activity 2: Internal rotation stretch R 3x20 sec  Therapeutic Exercise Activity 3: Rhomboid stretch 3x20 sec  Therapeutic Exercise Activity 4: Pec stretch in doorway 3x20 sec  Therapeutic Exercise Activity 5: External rotation stretch R 3x20 sec  Therapeutic Exercise Activity 6: Double Y flexion stretch on wall 3x20 sec  Therapeutic Exercise Activity 7: Chin Tucks x10  Therapeutic Exercise Activity 8: B scapular retraction RTB 2x10  Therapeutic Exercise Activity 9: B shoulder extension RTB 2x10  Therapeutic Exercise Activity 10: B shoulder flexion RTB 2x10      Manual Therapy  Manual Therapy Performed: Yes  Manual Therapy Activity 1: Manual Pec stretch off towel roll 3x30 sec  Manual  Therapy Activity 2: Manual STM with light TPR applied to R pec region and down into R bicep      EDUCATION:   Individual(s) Educated: Patient   Education Provided: Body Mechanics/HEP  Handout(s) Provided: Scanned into chart  Home Program: In previous notes- printed out new one d/t patient losing hers.   Risk and Benefits Discussed with Patient/Caregiver/Other: Yes   Patient/Caregiver Demonstrated Understanding: Yes   Patient Response to Education: Patient/Caregiver verbalized understanding of information and Patient/Caregiver performed return demonstration of exercises/activities    Assessment:   Patient responded fairly well to modest beginning of rehab program. Initiating various stretching interventions to improve flexibility and adding in light strengthening. Ending with manual work with positive response from patient. States she feels stretches at end of session, but no overall change in pain.         Plan:  Continue with POC and as per patient tolerated to improve ability to complete functional tasks  Frequency: 2 x Week  Duration: 4 Weeks       Goals:  Active       PT Problem       Patient will be independent with HEP.        Start:  06/10/24    Expected End:  06/24/24            Patient will score </= 6% on NDI to improve her ability to perform FA's.        Start:  06/10/24    Expected End:  07/08/24            Patient will demonstrate an improvement of at least 5 degrees in all directions with cervical AROM in order to improve her ability to perform self care and FA's.       Start:  06/10/24    Expected End:  07/08/24            Patient will demonstrate 5/5 strength with R shoulder MMT to improve her ability to perform functional lifting and reaching.       Start:  06/10/24    Expected End:  07/08/24            Patient will report no >/= 2/10 pain in her R pec region with overhead reaching.        Start:  06/10/24    Expected End:  07/08/24                 Layne Hand, PTA

## 2024-06-19 ENCOUNTER — TREATMENT (OUTPATIENT)
Dept: PHYSICAL THERAPY | Facility: HOSPITAL | Age: 59
End: 2024-06-19
Payer: COMMERCIAL

## 2024-06-19 NOTE — PROGRESS NOTES
Physical Therapy                 Therapy Communication Note    Patient Name: Sinai Mcbride  MRN: [id number]  Today's Date: 6/19/2024     Discipline: Physical Therapy    Missed Visit Reason: Missed Visit Reason: Cancel    Missed Time: Cancel    Comment: Patient called and cancelled today's appointment d/t being in too much pain.

## 2024-06-19 NOTE — PROGRESS NOTES
Subjective   Patient ID: Sinai Mcbride is a 59 y.o. female who presents for right side chest pain (Requesting referral for cardiology).    Right sided chest pain requesting cardiologist referral  Itching           Review of Systems    Objective   /80   Pulse 62   Temp 36.7 °C (98.1 °F)   Ht 1.524 m (5')   Wt 99.7 kg (219 lb 12.8 oz)   SpO2 96%   BMI 42.93 kg/m²     Physical Exam    Assessment/Plan   Diagnoses and all orders for this visit:  Chest pain, unspecified type  -     Referral to Cardiology; Future  Itching  -     hydrOXYzine HCL (Atarax) 25 mg tablet; Take 1 tablet (25 mg) by mouth every 6 hours if needed for itching or allergies.

## 2024-06-20 ENCOUNTER — TELEPHONE (OUTPATIENT)
Dept: PRIMARY CARE | Facility: CLINIC | Age: 59
End: 2024-06-20
Payer: COMMERCIAL

## 2024-06-20 DIAGNOSIS — J45.20 MILD INTERMITTENT ASTHMA WITHOUT COMPLICATION (HHS-HCC): ICD-10-CM

## 2024-06-20 DIAGNOSIS — I10 PRIMARY HYPERTENSION: Primary | ICD-10-CM

## 2024-06-20 DIAGNOSIS — R16.0 HEPATOMEGALY, NOT ELSEWHERE CLASSIFIED: ICD-10-CM

## 2024-06-20 PROBLEM — E66.9 CLASS 2 OBESITY: Status: ACTIVE | Noted: 2023-07-07

## 2024-06-20 PROBLEM — S43.401A SPRAIN OF RIGHT SHOULDER: Status: ACTIVE | Noted: 2024-06-20

## 2024-06-20 PROBLEM — E66.812 CLASS 2 OBESITY: Status: ACTIVE | Noted: 2023-07-07

## 2024-06-20 PROBLEM — R12 HEARTBURN: Status: ACTIVE | Noted: 2024-06-20

## 2024-06-20 PROBLEM — R42 DIZZINESS: Status: ACTIVE | Noted: 2024-06-20

## 2024-06-20 PROBLEM — G89.29 CHRONIC PAIN OF RIGHT UPPER EXTREMITY: Status: ACTIVE | Noted: 2024-06-20

## 2024-06-20 PROBLEM — Z86.79 HISTORY OF HYPERTENSION: Status: ACTIVE | Noted: 2024-06-20

## 2024-06-20 PROBLEM — M79.601 CHRONIC PAIN OF RIGHT UPPER EXTREMITY: Status: ACTIVE | Noted: 2024-06-20

## 2024-06-20 RX ORDER — LOSARTAN POTASSIUM 100 MG/1
100 TABLET ORAL DAILY
Qty: 90 TABLET | Refills: 3 | Status: SHIPPED | OUTPATIENT
Start: 2024-06-20

## 2024-06-20 RX ORDER — MOMETASONE FUROATE AND FORMOTEROL FUMARATE DIHYDRATE 200; 5 UG/1; UG/1
2 AEROSOL RESPIRATORY (INHALATION) 2 TIMES DAILY
Qty: 13 G | Refills: 1 | Status: SHIPPED | OUTPATIENT
Start: 2024-06-20

## 2024-06-20 NOTE — TELEPHONE ENCOUNTER
Pt daughter Marley called to advise pt threatened suicide a couple days ago.  Daughter is asking for Dr. Zarate's opinion as to how she should respond.  She advises family members believe this is an empty threat. Daughter believes patient is seeing a psych dr but is not aware of who it is.  She is going to check with her sister to see if they can figure it out and reach out to that physician.  Pt is currently staying with her other sister and children.  She does not believe she has been left alone.  Daughter states that pt has not been truthful regarding whether or not she takes her meds as prescribed and refuses any help with them.  Daughter does not believe they are at a stage where a  could come out to evaluate her or that an appt is necessary at this time.  Educated daughter as to the importance.  Advised daughter that she could ask pt if she has a plan in the event her mother threatens suicide again. Provided daughter with suicide prevention number and further education.  Daughter stated that she just wanted her mom's physician to know what is going on.

## 2024-06-24 ENCOUNTER — TREATMENT (OUTPATIENT)
Dept: PHYSICAL THERAPY | Facility: HOSPITAL | Age: 59
End: 2024-06-24
Payer: COMMERCIAL

## 2024-06-24 ENCOUNTER — HOSPITAL ENCOUNTER (OUTPATIENT)
Dept: RADIOLOGY | Facility: HOSPITAL | Age: 59
Discharge: HOME | End: 2024-06-24
Payer: COMMERCIAL

## 2024-06-24 ENCOUNTER — OFFICE VISIT (OUTPATIENT)
Dept: PAIN MEDICINE | Facility: CLINIC | Age: 59
End: 2024-06-24
Payer: COMMERCIAL

## 2024-06-24 VITALS
OXYGEN SATURATION: 97 % | DIASTOLIC BLOOD PRESSURE: 73 MMHG | HEART RATE: 53 BPM | SYSTOLIC BLOOD PRESSURE: 136 MMHG | BODY MASS INDEX: 42.21 KG/M2 | WEIGHT: 215 LBS | HEIGHT: 60 IN

## 2024-06-24 DIAGNOSIS — R10.11 RIGHT UPPER QUADRANT PAIN: ICD-10-CM

## 2024-06-24 DIAGNOSIS — M54.6 PAIN IN THORACIC SPINE: ICD-10-CM

## 2024-06-24 DIAGNOSIS — G58.8 INTERCOSTAL NEURALGIA: ICD-10-CM

## 2024-06-24 DIAGNOSIS — M54.2 CERVICALGIA: Primary | ICD-10-CM

## 2024-06-24 DIAGNOSIS — R29.898 RIGHT ARM WEAKNESS: ICD-10-CM

## 2024-06-24 DIAGNOSIS — M54.51 VERTEBROGENIC LOW BACK PAIN: ICD-10-CM

## 2024-06-24 DIAGNOSIS — M54.14 THORACIC RADICULOPATHY: Primary | ICD-10-CM

## 2024-06-24 DIAGNOSIS — E78.2 MIXED HYPERLIPIDEMIA: ICD-10-CM

## 2024-06-24 PROCEDURE — 99204 OFFICE O/P NEW MOD 45 MIN: CPT | Performed by: ANESTHESIOLOGY

## 2024-06-24 PROCEDURE — 97110 THERAPEUTIC EXERCISES: CPT | Mod: GP | Performed by: PHYSICAL THERAPIST

## 2024-06-24 PROCEDURE — 1036F TOBACCO NON-USER: CPT | Performed by: ANESTHESIOLOGY

## 2024-06-24 PROCEDURE — 3078F DIAST BP <80 MM HG: CPT | Performed by: ANESTHESIOLOGY

## 2024-06-24 PROCEDURE — 75571 CT HRT W/O DYE W/CA TEST: CPT

## 2024-06-24 PROCEDURE — 3075F SYST BP GE 130 - 139MM HG: CPT | Performed by: ANESTHESIOLOGY

## 2024-06-24 PROCEDURE — 99214 OFFICE O/P EST MOD 30 MIN: CPT | Performed by: ANESTHESIOLOGY

## 2024-06-24 ASSESSMENT — ENCOUNTER SYMPTOMS
CARDIOVASCULAR NEGATIVE: 1
ENDOCRINE NEGATIVE: 1
PSYCHIATRIC NEGATIVE: 1
RESPIRATORY NEGATIVE: 1
CONSTITUTIONAL NEGATIVE: 1
ALLERGIC/IMMUNOLOGIC NEGATIVE: 1
EYES NEGATIVE: 1
WEAKNESS: 1
BACK PAIN: 1
HEMATOLOGIC/LYMPHATIC NEGATIVE: 1
GASTROINTESTINAL NEGATIVE: 1
MYALGIAS: 1

## 2024-06-24 ASSESSMENT — PAIN SCALES - GENERAL
PAINLEVEL_OUTOF10: 6
PAINLEVEL: 6
PAINLEVEL_OUTOF10: 7

## 2024-06-24 ASSESSMENT — PATIENT HEALTH QUESTIONNAIRE - PHQ9
SUM OF ALL RESPONSES TO PHQ9 QUESTIONS 1 AND 2: 0
2. FEELING DOWN, DEPRESSED OR HOPELESS: NOT AT ALL
1. LITTLE INTEREST OR PLEASURE IN DOING THINGS: NOT AT ALL

## 2024-06-24 ASSESSMENT — PAIN - FUNCTIONAL ASSESSMENT
PAIN_FUNCTIONAL_ASSESSMENT: 0-10
PAIN_FUNCTIONAL_ASSESSMENT: 0-10

## 2024-06-24 ASSESSMENT — PAIN DESCRIPTION - DESCRIPTORS: DESCRIPTORS: NUMBNESS;ACHING

## 2024-06-24 ASSESSMENT — LIFESTYLE VARIABLES: TOTAL SCORE: 2

## 2024-06-24 NOTE — PROGRESS NOTES
"  Physical Therapy Treatment    Patient Name: Sinai Mcbride  MRN: [id number]  Today's Date: 6/24/2024  Time Calculation  Start Time: 1002  Stop Time: 1042  Time Calculation (min): 40 min        PT Therapeutic Procedures Time Entry  Therapeutic Exercise Time Entry: 40                Current Problem  1. Cervicalgia  Follow Up In Physical Therapy      2. Pain in thoracic spine  Follow Up In Physical Therapy        Problem List Items Addressed This Visit             ICD-10-CM    Cervicalgia - Primary M54.2    Pain in thoracic spine M54.6       General  Reason for Referral: R chest pain  Referred By: Anival King  Past Medical History Relevant to Rehab: h/o LBP, enlarged liver    Subjective   Current Condition:   Worse  Patient reports increased soreness and tenderness in her right side. Patient states that she missed a session last week due to increased pain.    Performing HEP?: Yes    Precautions  Precautions  Medical Precautions: Fall precautions  Pain  Pain Assessment: 0-10  0-10 (Numeric) Pain Score: 7  Pain Location: Rib cage  Pain Orientation: Right    Objective     Treatments:  Therapeutic Exercise  Therapeutic Exercise Performed: Yes  Therapeutic Exercise Activity 1: Posterior capsule stretch R 3x20 sec  Therapeutic Exercise Activity 4: Pec stretch in doorway 3x20 sec  Therapeutic Exercise Activity 6: Double Y flexion stretch on wall 3x20 sec  Therapeutic Exercise Activity 7: Chin Tucks x10  Therapeutic Exercise Activity 8: B scapular retraction YTB x10  Therapeutic Exercise Activity 9: B shoulder extension YTB x10  Therapeutic Exercise Activity 10: B shoulder flexion 1# x10  Therapeutic Exercise Activity 11: thoracic extension stretch in chair x5  Therapeutic Exercise Activity 12: open book thoracic rotation x5 R/L  Therapeutic Exercise Activity 13: pec minor stretch 3x10\"  Therapeutic Exercise Activity 14: pec stretch on towel roll x60\"         EDUCATION:   Individual(s) Educated: Patient  Education Provided: " yes  Handout(s) Provided: Scanned into chart  Home Program: reviewed home exercise program   Risk and Benefits Discussed with Patient/Caregiver/Other: Yes   Patient/Caregiver Demonstrated Understanding: Yes   Patient Response to Education: Patient/Caregiver verbalized understanding of information and Patient/Caregiver performed return demonstration of exercises/activities    Assessment: Patient did not appear to get relief with the exercises performed today. Patient states that she has a follow up scheduled with her physician for further assessment and imaging. Patient demonstrated good ability to perform exercises without complaints of increased pain.  PT Assessment  PT Assessment Results: Decreased strength, Decreased range of motion, Decreased mobility  Rehab Prognosis: Fair  Evaluation/Treatment Tolerance: Patient limited by pain, Patient tolerated treatment well    Plan: Continue with POC. Progress exercises as tolerated.  OP PT Plan  Treatment/Interventions: Education/ Instruction, Hot pack, Cryotherapy, Manual therapy, Neuromuscular re-education, Therapeutic activities, Therapeutic exercises  PT Plan: Skilled PT  PT Frequency: 2 times per week  Duration: 4 weeks  Onset Date: 03/05/24  Certification Period Start Date: 06/10/24  Certification Period End Date: 09/10/24  Number of Treatments Authorized: 3 of 8  Rehab Potential: Fair  Plan of Care Agreement: Patient    Goals:  Active       PT Problem       Patient will be independent with HEP.        Start:  06/10/24    Expected End:  06/24/24            Patient will score </= 6% on NDI to improve her ability to perform FA's.        Start:  06/10/24    Expected End:  07/08/24            Patient will demonstrate an improvement of at least 5 degrees in all directions with cervical AROM in order to improve her ability to perform self care and FA's.       Start:  06/10/24    Expected End:  07/08/24            Patient will demonstrate 5/5 strength with R shoulder MMT  to improve her ability to perform functional lifting and reaching.       Start:  06/10/24    Expected End:  07/08/24            Patient will report no >/= 2/10 pain in her R pec region with overhead reaching.        Start:  06/10/24    Expected End:  07/08/24                 eMsfin Chowdhury, PT

## 2024-06-24 NOTE — PROGRESS NOTES
Subjective   Patient ID: Sinai Mcbride is a 59 y.o. female who presents for No chief complaint on file..  HPI  Patient here today for a new patient evaluation of her low back and leg pain.  The pain first started in 2013 and she had a MRI and a RAYSA with Dr. Meléndez and she has been great since then.  She has used PT and a home exercise program, etodolac/robaxin to management herself very successfully over the last decade.  She states that she recently went from a job that was walking to a job where she was sitting.  She started having a squeezing sensation in the right side that became constant.  She went to her PCP and she had imaging completed of her abdomen and liver.  She was cleared from anything in her GI tract.  She has had rigth sided chest pain and was taken tot he ED and had a clear cardiac work up.  She has had numbness into the right had and in her right toes as well.  She now has had this constant pain for the last several months.  Her pain starts in the right thoracic area and wraps around to the axilla and into the front of her chest.  She has a aching pain and a squeezing pain.  At the end of the jennifer she has weakness in the right arm and into her hand.  She denies any neck pain.  She does have a lightheaded sensation often.  '  She denies any rash or any history of shingles.  The pain is only located on the right side.  It is not positional or or associated with movement.  She continues to have back pain in the low of her back that is nonradiating in nature.  When she switched from her walking job to a more sedentary job she had noticed the pain is starting to increase.  Despite all of her activities and stretching and self-guided exercise program taught by physical therapy as well as a book on chronic back pain and her providers the back pain has continued progress.  However at this time the thoracic radiating pain is the worst for her.  Review of Systems   Constitutional: Negative.    HENT: Negative.      Eyes: Negative.    Respiratory: Negative.     Cardiovascular: Negative.    Gastrointestinal: Negative.    Endocrine: Negative.    Genitourinary: Negative.    Musculoskeletal:  Positive for back pain and myalgias.   Skin: Negative.    Allergic/Immunologic: Negative.    Neurological:  Positive for weakness.   Hematological: Negative.    Psychiatric/Behavioral: Negative.         Objective   Physical Exam  Vitals and nursing note reviewed.   Constitutional:       Appearance: Normal appearance.   HENT:      Head: Normocephalic and atraumatic.      Right Ear: Ear canal and external ear normal.      Left Ear: Ear canal and external ear normal.      Nose: Nose normal.      Mouth/Throat:      Mouth: Mucous membranes are moist.      Pharynx: Oropharynx is clear.   Eyes:      Conjunctiva/sclera: Conjunctivae normal.      Pupils: Pupils are equal, round, and reactive to light.   Cardiovascular:      Rate and Rhythm: Normal rate.   Pulmonary:      Effort: Pulmonary effort is normal. No respiratory distress.   Chest:      Chest wall: Tenderness present.       Musculoskeletal:        Arms:       Cervical back: Normal range of motion and neck supple.      Thoracic back: Swelling, spasms and tenderness present.      Lumbar back: Tenderness present. No deformity. Decreased range of motion.        Back:       Comments: There is palpable tenderness along the ribs in the high axillary line at approximately T4-T5 on the right.       Skin:     General: Skin is warm and dry.   Neurological:      Mental Status: She is alert and oriented to person, place, and time.      Sensory: Sensation is intact.      Motor: Weakness (right arm) present.      Coordination: Coordination is intact.      Gait: Gait is intact.   Psychiatric:         Mood and Affect: Mood normal.         Thought Content: Thought content normal.         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    Radiology: Patient had MRI of lumbar spine in 2013 which already showed Modic endplate changes at L4-5 and L5-S1 as read by the radiologist.  She did have L5-S1 disc extrusion at that time.  Patient with severe radiating thoracic pain has undergone daily physical therapy as well as a home exercise program guided by a provider for over 6 months with worsening of her pain.  Patient have thoracic MRI without contrast.  Patient have right EMG/NCV as she has progressive weakness in the right arm.    Physically: Patient should continue with physical therapy activities as well as home exercise program guided by provider.    Psychologically: No issues at this time.    Medication: No changes at this time.    Duration: Greater than 1 year.    Intervention: We hold off on any intervention at this time until diagnostic workup has been completed.         Reji Acosta MD 06/24/24 1:58 PM

## 2024-06-25 ENCOUNTER — APPOINTMENT (OUTPATIENT)
Dept: CARDIOLOGY | Facility: HOSPITAL | Age: 59
End: 2024-06-25
Payer: COMMERCIAL

## 2024-06-25 ENCOUNTER — APPOINTMENT (OUTPATIENT)
Dept: CARDIOLOGY | Facility: CLINIC | Age: 59
End: 2024-06-25
Payer: COMMERCIAL

## 2024-06-26 ENCOUNTER — APPOINTMENT (OUTPATIENT)
Dept: PHYSICAL THERAPY | Facility: HOSPITAL | Age: 59
End: 2024-06-26
Payer: COMMERCIAL

## 2024-07-01 ENCOUNTER — TREATMENT (OUTPATIENT)
Dept: PHYSICAL THERAPY | Facility: HOSPITAL | Age: 59
End: 2024-07-01
Payer: COMMERCIAL

## 2024-07-01 DIAGNOSIS — M54.6 PAIN IN THORACIC SPINE: ICD-10-CM

## 2024-07-01 DIAGNOSIS — M54.2 CERVICALGIA: ICD-10-CM

## 2024-07-01 PROCEDURE — 97110 THERAPEUTIC EXERCISES: CPT | Mod: GP,CQ

## 2024-07-01 ASSESSMENT — PAIN - FUNCTIONAL ASSESSMENT: PAIN_FUNCTIONAL_ASSESSMENT: 0-10

## 2024-07-01 ASSESSMENT — PAIN SCALES - GENERAL: PAINLEVEL_OUTOF10: 6

## 2024-07-01 NOTE — PROGRESS NOTES
"  Physical Therapy Treatment    Patient Name: Sinai Mcbride  MRN: [id number]  Today's Date: 7/1/2024  Time Calculation  Start Time: 0835 (pt late)  Stop Time: 0910  Time Calculation (min): 35 min        PT Therapeutic Procedures Time Entry  Manual Therapy Time Entry: 4  Therapeutic Exercise Time Entry: 31                Current Problem  1. Cervicalgia  Follow Up In Physical Therapy      2. Pain in thoracic spine  Follow Up In Physical Therapy          General  Reason for Referral: R chest pain  Referred By: Anival King  Past Medical History Relevant to Rehab: h/o LBP, enlarged liver    Subjective   Current Condition:   Same  Patient reports she has pain down both arms and down into each thumb.  States she did better after last session with decreased exercises and reps.     Performing HEP?: Yes    Precautions  Precautions  Medical Precautions: Fall precautions  Pain  Pain Assessment: 0-10  0-10 (Numeric) Pain Score: 6  Pain Type: Chronic pain  Pain Location: Back  Pain Orientation: Right, Left    Objective           Treatments:    Therapeutic Exercise  Therapeutic Exercise Performed: Yes  Therapeutic Exercise Activity 6: Double Y flexion stretch on wall 3x20 sec  Therapeutic Exercise Activity 7: Chin Tucks x10  Therapeutic Exercise Activity 8: B scapular retraction YTB x10  Therapeutic Exercise Activity 9: B shoulder extension YTB x10  Therapeutic Exercise Activity 10: B ER Yellow  Therapeutic Exercise Activity 11: thoracic extension stretch in chair x5  Therapeutic Exercise Activity 12: open book thoracic rotation x5 R/L  Therapeutic Exercise Activity 13: Pain with pec minor stretch  Therapeutic Exercise Activity 14: pec stretch on towel roll x60\"         Manual Therapy  Manual Therapy Performed: Yes  Manual Therapy Activity 1: TPR L Levator scap and infraspinatus to decrease mm tension                   EDUCATION:   Individual(s) Educated: Patient  Education Provided: Issued T-band    Risk and Benefits Discussed " with Patient/Caregiver/Other: Yes   Patient/Caregiver Demonstrated Understanding: Yes   Patient Response to Education: Patient/Caregiver verbalized understanding of information    Assessment: Pt noted pain in the pec and anterior shoulder area with thoracic extension over chair. Held pec minor stretch today d/t c/o L shoulder pain.   PT Assessment  PT Assessment Results: Decreased strength, Decreased range of motion, Decreased mobility  Rehab Prognosis: Fair    Plan: Continue to progress current POC as tolerated to facilitate ability to perform functional activities.   OP PT Plan  PT Plan: Skilled PT  PT Frequency: 2 times per week  Duration: 4 weeks  Onset Date: 03/05/24  Certification Period Start Date: 06/10/24  Certification Period End Date: 09/10/24  Number of Treatments Authorized: 4 of 8    Goals:  Active       PT Problem       Patient will be independent with HEP.        Start:  06/10/24    Expected End:  06/24/24            Patient will score </= 6% on NDI to improve her ability to perform FA's.        Start:  06/10/24    Expected End:  07/08/24            Patient will demonstrate an improvement of at least 5 degrees in all directions with cervical AROM in order to improve her ability to perform self care and FA's.       Start:  06/10/24    Expected End:  07/08/24            Patient will demonstrate 5/5 strength with R shoulder MMT to improve her ability to perform functional lifting and reaching.       Start:  06/10/24    Expected End:  07/08/24            Patient will report no >/= 2/10 pain in her R pec region with overhead reaching.        Start:  06/10/24    Expected End:  07/08/24                 Ten Rutherford, PTA

## 2024-07-03 ENCOUNTER — TREATMENT (OUTPATIENT)
Dept: PHYSICAL THERAPY | Facility: HOSPITAL | Age: 59
End: 2024-07-03
Payer: COMMERCIAL

## 2024-07-03 DIAGNOSIS — M54.2 CERVICALGIA: ICD-10-CM

## 2024-07-03 DIAGNOSIS — M54.6 PAIN IN THORACIC SPINE: ICD-10-CM

## 2024-07-03 PROCEDURE — 97140 MANUAL THERAPY 1/> REGIONS: CPT | Mod: GP,CQ

## 2024-07-03 PROCEDURE — 97110 THERAPEUTIC EXERCISES: CPT | Mod: GP,CQ

## 2024-07-03 ASSESSMENT — PAIN SCALES - GENERAL: PAINLEVEL_OUTOF10: 4

## 2024-07-03 NOTE — PROGRESS NOTES
Physical Therapy Treatment    Patient Name: Sinai Mcbride  MRN: [id number]  Today's Date: 7/3/2024  Time Calculation  Start Time: 0919  Stop Time: 0700  Time Calculation (min): 1301 min        PT Therapeutic Procedures Time Entry  Manual Therapy Time Entry: 8  Therapeutic Exercise Time Entry: 30                Current Problem  1. Cervicalgia  Follow Up In Physical Therapy      2. Pain in thoracic spine  Follow Up In Physical Therapy          General  Reason for Referral: R chest pain  Referred By: Anival King  Past Medical History Relevant to Rehab: h/o LBP, enlarged liver    Subjective   Current Condition:   Better  Patient reports she is having less pain, however, did not do anything yesterday besides her HEP. States she has not had any numbness in the arms the past week.     Performing HEP?: Yes    Precautions  Precautions  Medical Precautions: Fall precautions  Pain  0-10 (Numeric) Pain Score: 4  Pain Type: Chronic pain  Pain Location: Arm  Pain Orientation: Right, Left    Objective   Cervical AROM: L Rotation 60*     Treatments:    Therapeutic Exercise  Therapeutic Exercise Performed: Yes  Therapeutic Exercise Activity 1: Nu Step Level 3 x5'  Therapeutic Exercise Activity 4: Pec stretch in doorway 3x20 sec  Therapeutic Exercise Activity 5: Scapular protraction x10  Therapeutic Exercise Activity 6: Lower trap setting on wall  Therapeutic Exercise Activity 7: chin tuck with extension x10  Therapeutic Exercise Activity 8: B scapular retraction Red x10  Therapeutic Exercise Activity 9: B shoulder extension Yellow  x10  Therapeutic Exercise Activity 10: B ER Yellow x10  Therapeutic Exercise Activity 12: open book thoracic rotation x5 R/L         Manual Therapy  Manual Therapy Performed: Yes  Manual Therapy Activity 1: sub occipital with distraction release to decrease mm tension  Manual Therapy Activity 2: Gentle P-A mobs grade II to decrease pain                   EDUCATION:   Individual(s) Educated:  Patient  Education Provided: purpose of manual techniques  Risk and Benefits Discussed with Patient/Caregiver/Other: Yes   Patient/Caregiver Demonstrated Understanding: Yes   Patient Response to Education: Patient/Caregiver verbalized understanding of information    Assessment: Modified open book exercise with the L arm d/t shoulder pain, then was able to complete without pain. Pt was able to tolerate manual techniques completed today without c/o pain during, just some minor soreness after, per pt.   PT Assessment  PT Assessment Results: Decreased strength, Decreased range of motion, Decreased mobility  Rehab Prognosis: Fair    Plan: Continue to progress current POC as tolerated to facilitate ability to perform functional activities.   OP PT Plan  PT Plan: Skilled PT  PT Frequency: 2 times per week  Duration: 4 weeks  Onset Date: 03/05/24  Certification Period Start Date: 06/10/24  Certification Period End Date: 09/10/24  Number of Treatments Authorized: 5 of 8    Goals:  Active       PT Problem       Patient will be independent with HEP.        Start:  06/10/24    Expected End:  06/24/24            Patient will score </= 6% on NDI to improve her ability to perform FA's.        Start:  06/10/24    Expected End:  07/08/24            Patient will demonstrate an improvement of at least 5 degrees in all directions with cervical AROM in order to improve her ability to perform self care and FA's.       Start:  06/10/24    Expected End:  07/08/24            Patient will demonstrate 5/5 strength with R shoulder MMT to improve her ability to perform functional lifting and reaching.       Start:  06/10/24    Expected End:  07/08/24            Patient will report no >/= 2/10 pain in her R pec region with overhead reaching.        Start:  06/10/24    Expected End:  07/08/24                 Ten Rutherford PTA

## 2024-07-08 ENCOUNTER — TREATMENT (OUTPATIENT)
Dept: PHYSICAL THERAPY | Facility: HOSPITAL | Age: 59
End: 2024-07-08
Payer: COMMERCIAL

## 2024-07-08 DIAGNOSIS — M54.6 PAIN IN THORACIC SPINE: ICD-10-CM

## 2024-07-08 DIAGNOSIS — M54.2 CERVICALGIA: Primary | ICD-10-CM

## 2024-07-08 PROCEDURE — 97140 MANUAL THERAPY 1/> REGIONS: CPT | Mod: GP | Performed by: PHYSICAL THERAPIST

## 2024-07-08 PROCEDURE — 97110 THERAPEUTIC EXERCISES: CPT | Mod: GP | Performed by: PHYSICAL THERAPIST

## 2024-07-08 ASSESSMENT — PAIN - FUNCTIONAL ASSESSMENT: PAIN_FUNCTIONAL_ASSESSMENT: 0-10

## 2024-07-08 ASSESSMENT — PAIN SCALES - GENERAL: PAINLEVEL_OUTOF10: 5 - MODERATE PAIN

## 2024-07-08 NOTE — PROGRESS NOTES
Physical Therapy Re-evaluation and Treatment    Patient Name: Sinai Mcbride  MRN: [id number]  Encounter Date: 7/8/2024  Time Calculation  Start Time: 0832  Stop Time: 0914  Time Calculation (min): 42 min        PT Therapeutic Procedures Time Entry  Manual Therapy Time Entry: 12  Therapeutic Exercise Time Entry: 30      Current Problem  Problem List Items Addressed This Visit             ICD-10-CM    Cervicalgia - Primary M54.2    Pain in thoracic spine M54.6     1. Cervicalgia  Follow Up In Physical Therapy      2. Pain in thoracic spine  Follow Up In Physical Therapy          General  Reason for Referral: R chest pain  Referred By: Anival King  Past Medical History Relevant to Rehab: h/o LBP, enlarged liver    Subjective   Current Condition:   Same  Patient reports that she is getting an MRI this week of her thoracic spine. She may be getting injections through pain management. She continues to have TTP along the R ribcage and NT into the B hands. She has most pain in her thumbs. She gets burning pain by the end of the day.     Performing HEP?: Partially    Precautions  Precautions  Medical Precautions: Fall precautions  Pain  Pain Assessment: 0-10  0-10 (Numeric) Pain Score: 5 - Moderate pain  Pain Type: Chronic pain  Pain Location: Arm  Pain Orientation: Right, Left    Objective         ROM     Cervical AROM (Degrees)     Flexion: 40  Extension: 37  (L) Side Bend: 20  (R) Side Bend: 20  (L) Rotation: 50  (R) Rotation: 55        Shoulder AROM (Degrees)                             (R)                    (L)  Flexion:            WFL                  WFL                                      Strength Testing     Shoulder                          (R)                    (L)  Flexion:            4+/5                 4+/5                               Abduction:       4/5                 4+/5                   ER:                    4/5                   4/5                     IR:                     4+/5                  4+/5                         Posture: shoulder rounded forward and head forward     Palpation: (+) TTP R pecs, R proximal biceps tendon     Joint Mobility: Patient declined testing    Outcome Measures:  Other Measures  Neck Disability Index: 18%    Treatments:    Therapeutic Exercise  Therapeutic Exercise Performed: Yes  Therapeutic Exercise Activity 1: Nu Step Level 3 x 5'  Therapeutic Exercise Activity 2: Pec stretch in doorway 3x20 sec  Therapeutic Exercise Activity 3: Scapular protraction x10  Therapeutic Exercise Activity 4: chin tuck with extension x10  Therapeutic Exercise Activity 5: B scapular retraction Red x10  Therapeutic Exercise Activity 6: B shoulder extension Yellow x10  Therapeutic Exercise Activity 7: B ER Yellow x10  Therapeutic Exercise Activity 8: Supine serratus punch 0# x 10  Therapeutic Exercise Activity 9: Re-check    Manual Therapy  Manual Therapy Performed: Yes  Manual Therapy Activity 1: sub occipital with distraction release to decrease mm tension  Manual Therapy Activity 2: Gentle P-A mobs grade II to decrease pain    EDUCATION:   Individual(s) Educated: Patient  Education Provided: Increase HEP compliance  Handout(s) Provided: Scanned into chart  Home Program: Continue with current HEP  Risk and Benefits Discussed with Patient/Caregiver/Other: Yes   Patient/Caregiver Demonstrated Understanding: Yes   Patient Response to Education: Patient/Caregiver verbalized understanding of information, Patient/Caregiver performed return demonstration of exercises/activities, and Patient/Caregiver asked appropriate questions    Assessment:   The patient is making slow progress towards her PT goals. Her R sided thoracic pain is decreasing but is still TTP. She continues to have B UE radicular symptoms, especially in her thumbs. She is dropping items due to this. Her cervical ROM, UE strength, flexibility, posture, and pain has made minimal progress. She is getting an MRI this week and may be getting  injections. Additional skilled PT warranted to address the above stated impairments, so the patient can perform FA's and work duties without increased pain or difficulty.    PT Assessment  PT Assessment Results: Decreased strength, Decreased range of motion, Decreased mobility  Rehab Prognosis: Fair    Plan: Continue with POC.   Continue with core stability, UE strengthening, ROM, flexibility, and scapular stability, so the patient can perform FA's without increased pain or difficulty.     OP PT Plan  Treatment/Interventions: Education/ Instruction, Hot pack, Cryotherapy, Manual therapy, Neuromuscular re-education, Therapeutic activities, Therapeutic exercises  PT Plan: Skilled PT  PT Frequency: 2 times per week  Duration: 4 weeks  Onset Date: 03/05/24  Certification Period Start Date: 06/10/24  Certification Period End Date: 09/10/24  Number of Treatments Authorized: 6 of 8 (Requesting for 8 additional visits (16 total))  Rehab Potential: Fair  Plan of Care Agreement: Patient  Payor: [facility] / Plan: [employer] / Product Type: *No Product type* /     Goals:  Active       PT Problem       Patient will be independent with HEP.  (Met)       Start:  06/10/24    Expected End:  06/24/24    Resolved:  07/08/24         Patient will score </= 6% on NDI to improve her ability to perform FA's.  (Progressing)       Start:  06/10/24    Expected End:  08/05/24            Patient will demonstrate an improvement of at least 5 degrees in all directions with cervical AROM in order to improve her ability to perform self care and FA's. (Progressing)       Start:  06/10/24    Expected End:  08/05/24            Patient will demonstrate 5/5 strength with R shoulder MMT to improve her ability to perform functional lifting and reaching. (Progressing)       Start:  06/10/24    Expected End:  08/05/24            Patient will report no >/= 2/10 pain in her R pec region with overhead reaching.  (Progressing)       Start:  06/10/24     Expected End:  08/05/24                 Darlin Victoria, PT

## 2024-07-09 ENCOUNTER — TELEPHONE (OUTPATIENT)
Dept: PRIMARY CARE | Facility: CLINIC | Age: 59
End: 2024-07-09
Payer: COMMERCIAL

## 2024-07-09 NOTE — TELEPHONE ENCOUNTER
Pt continues to have pain which has now moved into her arms and hands.  Patient advises she is seeing PT but pain in hands seems to have worsened.  Pt has appt 7/11 for MRI.  She is hoping you would prescribe pain medication until results come back.  Advised patient to keep appt for MRI, continue PT, keep pain management appt  8/8 and I would send her request to you for consideration.

## 2024-07-11 ENCOUNTER — HOSPITAL ENCOUNTER (OUTPATIENT)
Dept: RADIOLOGY | Facility: HOSPITAL | Age: 59
Discharge: HOME | End: 2024-07-11
Payer: COMMERCIAL

## 2024-07-11 DIAGNOSIS — M54.14 THORACIC RADICULOPATHY: ICD-10-CM

## 2024-07-11 PROCEDURE — 72146 MRI CHEST SPINE W/O DYE: CPT

## 2024-07-11 PROCEDURE — 72146 MRI CHEST SPINE W/O DYE: CPT | Performed by: RADIOLOGY

## 2024-07-15 ENCOUNTER — DOCUMENTATION (OUTPATIENT)
Dept: PHYSICAL THERAPY | Facility: HOSPITAL | Age: 59
End: 2024-07-15
Payer: COMMERCIAL

## 2024-07-15 ENCOUNTER — TREATMENT (OUTPATIENT)
Dept: PHYSICAL THERAPY | Facility: HOSPITAL | Age: 59
End: 2024-07-15
Payer: COMMERCIAL

## 2024-07-15 DIAGNOSIS — M54.6 PAIN IN THORACIC SPINE: ICD-10-CM

## 2024-07-15 DIAGNOSIS — M54.2 CERVICALGIA: ICD-10-CM

## 2024-07-15 NOTE — PROGRESS NOTES
cPhysical Therapy                 Therapy Communication Note    Patient Name: Sinai Mcbride  MRN: [id number]  Today's Date: 7/15/2024     Discipline: Physical Therapy    Missed Visit Reason:  Pt came in for appointment today and was emotionally upset.  Pt states she is frustrated with her lack of progress and limitations d/t pain.  Pt cancelling all appointmens and will call to schedule when she feels she is ready.  PT to send note over to PCP.     Missed Time: Cancel

## 2024-07-17 ENCOUNTER — APPOINTMENT (OUTPATIENT)
Dept: PHYSICAL THERAPY | Facility: HOSPITAL | Age: 59
End: 2024-07-17
Payer: COMMERCIAL

## 2024-07-22 ENCOUNTER — TELEPHONE (OUTPATIENT)
Dept: PRIMARY CARE | Facility: CLINIC | Age: 59
End: 2024-07-22
Payer: COMMERCIAL

## 2024-07-22 NOTE — TELEPHONE ENCOUNTER
Darlin Victoria PT sent Dr. Zarate a message regarding the patient.  I called the patient this morning to schedule an appt so she could voice her concerns.  She said that she was seeing a new pain management doctor on 7/23/24 and didn't want to see Dr. Zarate.  I asked her to call to let us know how it went and if she wanted to schedule an appt with Dr. Zarate after that.

## 2024-07-23 ENCOUNTER — APPOINTMENT (OUTPATIENT)
Dept: PAIN MEDICINE | Facility: HOSPITAL | Age: 59
End: 2024-07-23
Payer: COMMERCIAL

## 2024-07-24 ENCOUNTER — TELEPHONE (OUTPATIENT)
Dept: PRIMARY CARE | Facility: CLINIC | Age: 59
End: 2024-07-24
Payer: COMMERCIAL

## 2024-07-26 DIAGNOSIS — M54.9 DORSALGIA: ICD-10-CM

## 2024-08-01 RX ORDER — METHOCARBAMOL 500 MG/1
500 TABLET, FILM COATED ORAL 3 TIMES DAILY PRN
Qty: 60 TABLET | Refills: 2 | Status: SHIPPED | OUTPATIENT
Start: 2024-08-01

## 2024-08-05 ENCOUNTER — APPOINTMENT (OUTPATIENT)
Dept: NEUROLOGY | Facility: HOSPITAL | Age: 59
End: 2024-08-05
Payer: COMMERCIAL

## 2024-08-08 ENCOUNTER — OFFICE VISIT (OUTPATIENT)
Dept: PAIN MEDICINE | Facility: CLINIC | Age: 59
End: 2024-08-08
Payer: COMMERCIAL

## 2024-08-08 VITALS
OXYGEN SATURATION: 97 % | HEART RATE: 79 BPM | BODY MASS INDEX: 42.21 KG/M2 | DIASTOLIC BLOOD PRESSURE: 92 MMHG | HEIGHT: 60 IN | RESPIRATION RATE: 16 BRPM | SYSTOLIC BLOOD PRESSURE: 142 MMHG | WEIGHT: 215 LBS

## 2024-08-08 DIAGNOSIS — M54.51 VERTEBROGENIC LOW BACK PAIN: ICD-10-CM

## 2024-08-08 DIAGNOSIS — M54.12 CERVICAL RADICULOPATHY: ICD-10-CM

## 2024-08-08 DIAGNOSIS — M51.24 THORACIC DISC HERNIATION: Primary | ICD-10-CM

## 2024-08-08 PROCEDURE — 99214 OFFICE O/P EST MOD 30 MIN: CPT | Performed by: ANESTHESIOLOGY

## 2024-08-08 PROCEDURE — 3080F DIAST BP >= 90 MM HG: CPT | Performed by: ANESTHESIOLOGY

## 2024-08-08 PROCEDURE — 3008F BODY MASS INDEX DOCD: CPT | Performed by: ANESTHESIOLOGY

## 2024-08-08 PROCEDURE — 1036F TOBACCO NON-USER: CPT | Performed by: ANESTHESIOLOGY

## 2024-08-08 PROCEDURE — 3077F SYST BP >= 140 MM HG: CPT | Performed by: ANESTHESIOLOGY

## 2024-08-08 RX ORDER — SODIUM CHLORIDE, SODIUM LACTATE, POTASSIUM CHLORIDE, CALCIUM CHLORIDE 600; 310; 30; 20 MG/100ML; MG/100ML; MG/100ML; MG/100ML
20 INJECTION, SOLUTION INTRAVENOUS CONTINUOUS
OUTPATIENT
Start: 2024-08-08

## 2024-08-08 RX ORDER — AMITRIPTYLINE HYDROCHLORIDE 25 MG/1
TABLET, FILM COATED ORAL
Qty: 30 TABLET | Refills: 0 | Status: SHIPPED | OUTPATIENT
Start: 2024-08-08

## 2024-08-08 ASSESSMENT — ENCOUNTER SYMPTOMS
HEMATOLOGIC/LYMPHATIC NEGATIVE: 1
ALLERGIC/IMMUNOLOGIC NEGATIVE: 1
MYALGIAS: 1
WEAKNESS: 1
CONSTITUTIONAL NEGATIVE: 1
BACK PAIN: 1
RESPIRATORY NEGATIVE: 1
CARDIOVASCULAR NEGATIVE: 1
GASTROINTESTINAL NEGATIVE: 1
EYES NEGATIVE: 1
ENDOCRINE NEGATIVE: 1
PSYCHIATRIC NEGATIVE: 1

## 2024-08-08 ASSESSMENT — PATIENT HEALTH QUESTIONNAIRE - PHQ9
SUM OF ALL RESPONSES TO PHQ9 QUESTIONS 1 AND 2: 0
1. LITTLE INTEREST OR PLEASURE IN DOING THINGS: NOT AT ALL
2. FEELING DOWN, DEPRESSED OR HOPELESS: NOT AT ALL

## 2024-08-08 ASSESSMENT — PAIN SCALES - GENERAL
PAINLEVEL: 6
PAINLEVEL_OUTOF10: 6

## 2024-08-08 ASSESSMENT — PAIN - FUNCTIONAL ASSESSMENT: PAIN_FUNCTIONAL_ASSESSMENT: 0-10

## 2024-08-08 ASSESSMENT — PAIN DESCRIPTION - DESCRIPTORS: DESCRIPTORS: ACHING

## 2024-08-08 NOTE — PROGRESS NOTES
Subjective   Patient ID: Sinai Mcbride is a 59 y.o. female who presents for Back Pain.  Back Pain  Associated symptoms include weakness.   Patient here today for follow up today to go over her new thoracic MRI.  She reports her pain as a 6/10.  She suffers with chest and arm pain as well as low back pain.  She rates her pain as a 6/10 today.  She did complete a land-based physical therapy program for her back and arm pain.  It was very challenging for her and she got discouraged.  She continues to have significant chest neck and radiating arm pain down of the both arms.  She has numbness down both arms.  She has weakness in her arms.  She was not able to get the EMG scheduled she had a hard time calling central scheduling at [facility].  She would like help with this.  She also suffers with chronic low back pain that she has had since 2013.  She is very active works on cars does a lot of yard work.  She has a hard time with sitting for prolonged periods of time.  Lumbar flexion and activities of bending and lifting are also very hard for her.  She would like to start working on this as well.  She states that evening times are very challenging for her.  She would like to have something to take in the evening time to help with sleep and help with her neuropathic arm pain.    Review of Systems   Constitutional: Negative.    HENT: Negative.     Eyes: Negative.    Respiratory: Negative.     Cardiovascular: Negative.    Gastrointestinal: Negative.    Endocrine: Negative.    Genitourinary: Negative.    Musculoskeletal:  Positive for back pain and myalgias.   Skin: Negative.    Allergic/Immunologic: Negative.    Neurological:  Positive for weakness.   Hematological: Negative.    Psychiatric/Behavioral: Negative.         Objective   Physical Exam  Vitals and nursing note reviewed.   Constitutional:       Appearance: Normal appearance.   HENT:      Head: Normocephalic and atraumatic.      Right Ear: Ear canal and  external ear normal.      Left Ear: Ear canal and external ear normal.      Nose: Nose normal.      Mouth/Throat:      Mouth: Mucous membranes are moist.      Pharynx: Oropharynx is clear.   Eyes:      Conjunctiva/sclera: Conjunctivae normal.      Pupils: Pupils are equal, round, and reactive to light.   Cardiovascular:      Rate and Rhythm: Normal rate.   Pulmonary:      Effort: Pulmonary effort is normal. No respiratory distress.   Chest:      Chest wall: Tenderness present.       Musculoskeletal:        Arms:       Cervical back: Normal range of motion and neck supple.      Thoracic back: Swelling, spasms and tenderness present.      Lumbar back: Tenderness present. No deformity. Decreased range of motion.        Back:       Comments: There is palpable tenderness along the ribs in the high axillary line at approximately T4-T5 on the right.       Skin:     General: Skin is warm and dry.   Neurological:      Mental Status: She is alert and oriented to person, place, and time.      Sensory: Sensation is intact.      Motor: Weakness (right arm) present.      Coordination: Coordination is intact.      Gait: Gait is intact.      Deep Tendon Reflexes:      Reflex Scores:       Bicep reflexes are 2+ on the right side and 2+ on the left side.       Brachioradialis reflexes are 2+ on the right side and 2+ on the left side.     Comments: Rachel sign negative bilaterally   Psychiatric:         Mood and Affect: Mood normal.         Thought Content: Thought content normal.         Assessment/Plan   Problem List Items Addressed This Visit    None  Visit Diagnoses         Codes    Thoracic disc herniation    -  Primary M51.24    Cervical radiculopathy     M54.12    Relevant Orders    MR cervical spine wo IV contrast    EMG & nerve conduction    Vertebrogenic low back pain     M54.51    Relevant Orders    MR lumbar spine wo IV contrast          I nice discussion with the patient today our plan will be as follows.    Radiology:  I went with the patient's thoracic MRI with her today.  She does have a right sided paracentral disc herniation at T2-3.  She has extensive degenerative changes throughout the thoracic spine present as well.  No cord compression present.  Patient continues to have worsening upper extremity weakness and pain and numbness.  We will move forward with cervical MRI without contrast.  Patient with progressive lumbar pain despite her rehabilitative efforts.  Will move forward with a new lumbar MRI without contrast.  2013 MRI did show Modic changes.    Physically: Patient has completed land-based therapy which was extremely challenging for her.  She does want to maintain some activity we will move to 6 weeks of aqua therapy.    Psychologically: Continue with mental health care support.    Medication: Patient to start amitriptyline 25 mg half tablet p.o. nightly as needed.    Duration: Greater than 1 year.    Intervention: Patient to have T2-3 epidural steroid injection under fluoroscopic guidance to help with T2-3 radicular pain as it does correlate with the imaging.  Risks, benefit, and alternatives of the procedure were discussed with the patient.  Oswestry score has been compelted and recorded.         Reji Acosta MD 08/08/24 9:59 AM

## 2024-08-12 ENCOUNTER — DOCUMENTATION (OUTPATIENT)
Dept: PHYSICAL THERAPY | Facility: HOSPITAL | Age: 59
End: 2024-08-12
Payer: COMMERCIAL

## 2024-08-12 NOTE — PROGRESS NOTES
Physical Therapy    Discharge Summary    Name: Sinai Mcbride  MRN: [id number]  : 1965  Date: 24    Discharge Summary: PT    Discharge Information: Date of discharge 2024, Date of last visit 2024, Date of evaluation 6/10/2024, Number of attended visits 6, Referred by MARLY Murray-CNP, and Referred for neck and thoracic pain    Therapy Summary: Patient presents to clinic with R sided pec./chest pain starting Spring of 2023. The patient has intermittent tingling into the B hands as well.      Discharge Status: Patient did not meet goals. She continues to have pain at time of last session. She may be a candidate for pool therapy.     Rehab Discharge Reason: Progress plateaued; further improvement possible

## 2024-08-22 ENCOUNTER — HOSPITAL ENCOUNTER (OUTPATIENT)
Dept: RADIOLOGY | Facility: HOSPITAL | Age: 59
Discharge: HOME | End: 2024-08-22
Payer: COMMERCIAL

## 2024-08-22 ENCOUNTER — APPOINTMENT (OUTPATIENT)
Dept: RADIOLOGY | Facility: HOSPITAL | Age: 59
End: 2024-08-22
Payer: COMMERCIAL

## 2024-08-22 DIAGNOSIS — M54.12 CERVICAL RADICULOPATHY: ICD-10-CM

## 2024-08-22 PROCEDURE — 72141 MRI NECK SPINE W/O DYE: CPT

## 2024-08-26 ENCOUNTER — APPOINTMENT (OUTPATIENT)
Dept: PAIN MEDICINE | Facility: CLINIC | Age: 59
End: 2024-08-26
Payer: COMMERCIAL

## 2024-08-30 ENCOUNTER — EVALUATION (OUTPATIENT)
Dept: PHYSICAL THERAPY | Facility: HOSPITAL | Age: 59
End: 2024-08-30
Payer: COMMERCIAL

## 2024-08-30 DIAGNOSIS — M51.24 THORACIC DISC HERNIATION: ICD-10-CM

## 2024-08-30 DIAGNOSIS — M54.12 CERVICAL RADICULOPATHY: Primary | ICD-10-CM

## 2024-08-30 DIAGNOSIS — M54.51 VERTEBROGENIC LOW BACK PAIN: ICD-10-CM

## 2024-08-30 DIAGNOSIS — M54.6 PAIN IN THORACIC SPINE: ICD-10-CM

## 2024-08-30 PROCEDURE — 97110 THERAPEUTIC EXERCISES: CPT | Mod: GP | Performed by: PHYSICAL THERAPIST

## 2024-08-30 PROCEDURE — 97161 PT EVAL LOW COMPLEX 20 MIN: CPT | Mod: GP | Performed by: PHYSICAL THERAPIST

## 2024-08-30 ASSESSMENT — PAIN SCALES - GENERAL: PAINLEVEL_OUTOF10: 5 - MODERATE PAIN

## 2024-08-30 ASSESSMENT — PAIN - FUNCTIONAL ASSESSMENT: PAIN_FUNCTIONAL_ASSESSMENT: 0-10

## 2024-08-30 NOTE — PROGRESS NOTES
Physical Therapy  Physical Therapy Orthopedic Evaluation    Patient Name: Sinai Mcbride  MRN: [id number]  Today's Date: 8/30/2024  Time Calculation  Start Time: 1047  Stop Time: 1122  Time Calculation (min): 35 min    Today's Charges  PT Evaluation Time Entry  PT Evaluation (Low) Time Entry: 30  PT Therapeutic Procedures Time Entry  Therapeutic Exercise Time Entry: 5     Insurance:  Visit number: 1 of 1  Authorization info: auth required  Payor: [employer] / Plan: [employer] / Product Type: *No Product type* /     Current Problem  Problem List Items Addressed This Visit             ICD-10-CM    Pain in thoracic spine M54.6    Relevant Orders    Follow Up In Physical Therapy    Thoracic disc herniation M51.24    Cervical radiculopathy - Primary M54.12    Relevant Orders    Follow Up In Physical Therapy    Vertebrogenic low back pain M54.51     1. Cervical radiculopathy  Referral to Physical Therapy    Follow Up In Physical Therapy      2. Thoracic disc herniation  Referral to Physical Therapy      3. Vertebrogenic low back pain  Referral to Physical Therapy      4. Pain in thoracic spine  Follow Up In Physical Therapy          General:  General  Reason for Referral: neck pain  Referred By: Dr. Acosta      Precautions:   Precautions  Medical Precautions: Fall precautions    Medical History Form: Reviewed (scanned into chart)    Subjective:   Subjective   Chief Complaint: Patient is a 59 year old female who presents to clinic with complains of cervical, thoracic, and lumbar pain. Patient presents to clinic with R sided pec./chest pain starting Spring of 2023. The patient has intermittent numbness into the B hands as well. The patient has seen oncology, pain management, and cardiology. Patient states that she wants to do therapy to feel better, but is discouraged due to not getting better after her last bout of physical therapy.  Onset Date: 8/8/2024  GRACY: Chronic    Current Condition:   Better    Pain:  Pain  Assessment: 0-10  0-10 (Numeric) Pain Score: 5 - Moderate pain  Pain Location: Chest  Pain Orientation: Right  Pain Radiating Towards: radiates to chest and arms  Highest: 10/10 pain  Lowest: 4/10 pain  Aggravating Factors:  Reaching Overhead, working on her car  Relieving Factors:  Rest    Relevant Information (PMH & Previous Tests/Imaging):   Thoracic spine MRI 7/11/2024:  T1-2: There is no significant central canal stenosis.  T2-3:  Tiny right paracentral disc protrusion does not narrow the spinal canal. T3-4: There is no significant central canal stenosis.  T4-5: There is no significant central canal stenosis.  T5-6: There is no significant central canal stenosis.  T6-7: There is no significant central canal stenosis.  T7-8: There is no significant central canal stenosis.  T8-9: There is no significant central canal stenosis.  T9-10: There is no significant central canal stenosis.  T10-11: There is no significant central canal stenosis.  T11-12: Degenerative facet arthropathy, ligamentum flavum hypertrophy, and diffuse disc bulging contribute to mild narrowing of the spinal canal. T12-L1: There is a tiny left  paracentral disc protrusion and mild bilateral degenerative facet arthropathy without central canal stenosis.    Cervical spine MRI 8/22/2024:  The cervical spine is relatively straightened.  The C5 cysts disc height is mildly decreased. The vertebral body and remaining disc space heights are normal.  No intrinsic abnormalities of the spinal cord are noted.  Craniocervical junction: No mass of the foramen magnum is noted.  C2-3: No stenosis is noted.  C3-4: Spinal canal is mildly stenosed by disc bulge abutting the cord.  C4-5: The thecal sac is minimally indented by posterior longitudinal ligament thickening.  C5-6: The spinal canal is mildly stenosed by disc bulge.  C6-7: The spinal canal and left lateral recess are mildly to moderately stenosed by focal central left disc protrusion flattening the left  "ventral cord.      Paraspinous Soft Tissues: Within normal limits.    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 Be able to work on her car and other activities.    Red Flags: Do you have any of the following? No  Fever/chills, unexplained weight changes, dizziness/fainting, unexplained change in bowel or bladder functions, unexplained malaise or muscle weakness, night pain/sweats, numbness or tingling    Objective:  Objective     Cervical AROM  Cervical flexion: (80°): 36  Cervical extension: (50°): 25  Cervical Rotation: (80°): 64  Cervical rotation left: (80°): 43  Cervical sidebend right: (45°): 13  Cervical sidebend left: (45°): 17    Shoulder AROM  R Shoulder flexion: (180°): WFL  L Shoulder flexion: (180°): WFL  R shoulder abduction: (180°): WFL  L Shoulder abduction: (180°): WFL    Shoulder Strength  R shoulder flexion: (5/5): 4/5  L shoulder flexion: (5/5): 4/5  R shoulder abduction: (5/5): 4/5  L shoulder abduction: (5/5): 4/5  R shoulder ER: (5/5): 4+/5  L shoulder ER: (5/5): 4+/5  R shoulder IR: (5/5) : 4+/5  L shoulder IR: (5/5): 4+/5    DTR   R Biceps C5: (2+): 2+  L Biceps C5: (2+): 2+  R Brachioradialis C6: (2+): 2+  L Brachioradialis C6: (2+): 2+      Special Tests    Spurlings Test: -  Radicular Symptoms: +    Palpation: point tenderness to left paraspinals, upper traps, levator scap    Joint Mobility: hypomobile cervical spine    Posture: decreased cervical lordosis    Treatment Performed:  Therapeutic Exercise  Therapeutic Exercise Performed: Yes  Therapeutic Exercise Activity 1: upper trap stretch 1x15\"  Therapeutic Exercise Activity 2: " "SCM stretch 1x15\"  Therapeutic Exercise Activity 3: levator scap stretch 1x15\"  Therapeutic Exercise Activity 4: cervical retraction x5  Therapeutic Exercise Activity 5: cervical retraction with extension x3         Outcome Measures:  Other Measures  Neck Disability Index: 40%     EDUCATION:   Individual(s) Educated: patient   Education Provided: Home exercise program, plan of care, activity modifications, pain management, and injury pathology  Handout(s) Provided: Scanned into chart  Home Program: Access Code: [email or web address]  Risk and Benefits Discussed with Patient/Caregiver/Other: Yes   Patient/Caregiver Demonstrated Understanding: Yes   Plan of Care Discussed and Agreed Upon: Yes   Patient Response to Education: Patient/Caregiver verbalized understanding of information and Patient/Caregiver performed return demonstration of exercises/activities    Assessment: Patient presents with impaired range of motion, flexibility, and strength resulting in limited participation in pain-free ADLs and inability to perform at their prior level of function. Patient demonstrated impaired cervical spine rotation to her left and difficulty performing cervical spine extension. Patient patient's MRI, she has decreased cervical spine lordosis. Skilled PT warranted to address the above stated impairments, so the patient can perform FA's without increased pain or difficulty.    PT Assessment Results: Decreased strength, Decreased range of motion, Pain, Obesity  Rehab Prognosis: Good  Evaluation/Treatment Tolerance: Patient tolerated treatment well, Patient limited by pain    Complexity: low    Plan:  Treatment/Interventions: Education/ Instruction, Hot pack, Cryotherapy, Manual therapy, Neuromuscular re-education, Therapeutic activities, Therapeutic exercises  PT Plan: Skilled PT  PT Frequency: 2 times per week  Duration: 5 weeks  Onset Date: 08/08/24  Certification Period Start Date: 08/30/24  Certification Period End Date: 10/04/24  Number " of Treatments Authorized: 1 of 1  Rehab Potential: Good  Plan of Care Agreement: Patient    Goals: Set and discussed today  Active       PT Problem       Patient will achieve bilateral cervical side bending ROM at least 30 degrees       Start:  08/30/24    Expected End:  10/04/24            Patient will achieve bilateral cervical rotation ROM at least 70 degrees       Start:  08/30/24    Expected End:  10/04/24            Patient will achieve cervical flexion ROM at least 60 degrees       Start:  08/30/24    Expected End:  10/04/24            Patient will achieve cervical extension ROM at least 40 degrees       Start:  08/30/24    Expected End:  10/04/24            Patient will achieve bilateral shoulder flexion strength of at least 4+/5       Start:  08/30/24    Expected End:  10/04/24            Patient will achieve bilateral shoulder abduction strength of at least 4+/5       Start:  08/30/24    Expected End:  10/04/24            Patient will demonstrate independence in home program for support of progression       Start:  08/30/24    Expected End:  10/04/24            Patient will report pain of no more than 2/10 demonstrating a reduction of overall pain       Start:  08/30/24    Expected End:  10/04/24            Patient will show a significant change in NDI (40% to 30%) patient reported outcome tool to demonstrate subjective imporovement       Start:  08/30/24    Expected End:  10/04/24                Plan of care was developed with input and agreement by the patient    Mesfin Chowdhury, PT

## 2024-09-04 ENCOUNTER — APPOINTMENT (OUTPATIENT)
Dept: RADIOLOGY | Facility: HOSPITAL | Age: 59
End: 2024-09-04
Payer: COMMERCIAL

## 2024-09-05 ENCOUNTER — TREATMENT (OUTPATIENT)
Dept: PHYSICAL THERAPY | Facility: HOSPITAL | Age: 59
End: 2024-09-05
Payer: COMMERCIAL

## 2024-09-05 DIAGNOSIS — M54.6 PAIN IN THORACIC SPINE: ICD-10-CM

## 2024-09-05 DIAGNOSIS — M54.12 CERVICAL RADICULOPATHY: Primary | ICD-10-CM

## 2024-09-05 PROCEDURE — 97110 THERAPEUTIC EXERCISES: CPT | Mod: GP | Performed by: PHYSICAL THERAPIST

## 2024-09-05 ASSESSMENT — PAIN SCALES - GENERAL: PAINLEVEL_OUTOF10: 4

## 2024-09-05 ASSESSMENT — PAIN - FUNCTIONAL ASSESSMENT: PAIN_FUNCTIONAL_ASSESSMENT: 0-10

## 2024-09-05 NOTE — PROGRESS NOTES
"  Physical Therapy Treatment    Patient Name: Sinai Mcbride  MRN: [id number]  Today's Date: 9/5/2024  Time Calculation  Start Time: 1131  Stop Time: 1215  Time Calculation (min): 44 min        PT Therapeutic Procedures Time Entry  Therapeutic Exercise Time Entry: 44                Current Problem  1. Cervical radiculopathy  Follow Up In Physical Therapy      2. Pain in thoracic spine  Follow Up In Physical Therapy        Problem List Items Addressed This Visit             ICD-10-CM    Pain in thoracic spine M54.6    Cervical radiculopathy - Primary M54.12       General  Reason for Referral: neck pain  Referred By: Dr. Acosta    Subjective   Current Condition:   Same  Patient reports decreased numbness/tingling in her hands/fingers. Patient states that she is also taking Robaxin and Lodine daily.    Performing HEP?: Yes    Precautions  Precautions  Medical Precautions: Fall precautions  Pain  Pain Assessment: 0-10  0-10 (Numeric) Pain Score: 4  Pain Location: Chest  Pain Orientation: Right  Pain Radiating Towards: radiates to chest and arms    Objective     Treatments:  Therapeutic Exercise  Therapeutic Exercise Performed: Yes  Therapeutic Exercise Activity 2: SCM stretch 3x15\"  Therapeutic Exercise Activity 3: levator scap stretch 1x15\"  Therapeutic Exercise Activity 4: cervical retraction x5  Therapeutic Exercise Activity 5: cervical retraction with extension x5  Therapeutic Exercise Activity 6: B shoulder extension red x10  Therapeutic Exercise Activity 7: B scapular retraction Red x10  Therapeutic Exercise Activity 8: standing open book thoracic rotation x5 R/L  Therapeutic Exercise Activity 9: Sci Fit UBE lvl1 2' fwd/bwd  Therapeutic Exercise Activity 10: Pec stretch in doorway 3x20 sec  Therapeutic Exercise Activity 11: seated thoracic extension x5  Therapeutic Exercise Activity 12: Supine Thoracic Mobilization Towel Roll Vertical x30\"         EDUCATION:   Individual(s) Educated: Patient  Education Provided: " yes  Handout(s) Provided: Scanned into chart  Home Program: reviewed home exercise program   Risk and Benefits Discussed with Patient/Caregiver/Other: Yes   Patient/Caregiver Demonstrated Understanding: Yes   Patient Response to Education: Patient/Caregiver verbalized understanding of information and Patient/Caregiver performed return demonstration of exercises/activities    Assessment: Patient demonstrated good tolerance to exercises without complaints of pain. Patient's numbness/tingling in her fingers appears to be improving. Patient benefited from verbal cues for proper technique during stretching with good follow through.  PT Assessment  PT Assessment Results: Decreased strength, Decreased range of motion, Pain, Obesity  Rehab Prognosis: Good  Evaluation/Treatment Tolerance: Patient tolerated treatment well    Plan: Continue with POC. Progress exercises as tolerated.  OP PT Plan  Treatment/Interventions: Education/ Instruction, Hot pack, Cryotherapy, Manual therapy, Neuromuscular re-education, Therapeutic activities, Therapeutic exercises  PT Plan: Skilled PT  PT Frequency: 2 times per week  Duration: 5 weeks  Onset Date: 08/08/24  Certification Period Start Date: 08/30/24  Certification Period End Date: 10/04/24  Number of Treatments Authorized: 2 of 11  Rehab Potential: Good  Plan of Care Agreement: Patient    Goals:  Active       PT Problem       Patient will achieve bilateral cervical side bending ROM at least 30 degrees       Start:  08/30/24    Expected End:  10/04/24            Patient will achieve bilateral cervical rotation ROM at least 70 degrees       Start:  08/30/24    Expected End:  10/04/24            Patient will achieve cervical flexion ROM at least 60 degrees       Start:  08/30/24    Expected End:  10/04/24            Patient will achieve cervical extension ROM at least 40 degrees       Start:  08/30/24    Expected End:  10/04/24            Patient will achieve bilateral shoulder flexion  strength of at least 4+/5       Start:  08/30/24    Expected End:  10/04/24            Patient will achieve bilateral shoulder abduction strength of at least 4+/5       Start:  08/30/24    Expected End:  10/04/24            Patient will demonstrate independence in home program for support of progression       Start:  08/30/24    Expected End:  10/04/24            Patient will report pain of no more than 2/10 demonstrating a reduction of overall pain       Start:  08/30/24    Expected End:  10/04/24            Patient will show a significant change in NDI (40% to 30%) patient reported outcome tool to demonstrate subjective imporovement       Start:  08/30/24    Expected End:  10/04/24                 Mesfin Chowdhury, PT     2020

## 2024-09-09 ENCOUNTER — PROCEDURE VISIT (OUTPATIENT)
Dept: PAIN MEDICINE | Facility: CLINIC | Age: 59
End: 2024-09-09
Payer: COMMERCIAL

## 2024-09-09 DIAGNOSIS — G56.03 BILATERAL CARPAL TUNNEL SYNDROME: Primary | ICD-10-CM

## 2024-09-09 DIAGNOSIS — R20.2 PARESTHESIA: ICD-10-CM

## 2024-09-09 PROCEDURE — 95886 MUSC TEST DONE W/N TEST COMP: CPT | Performed by: PHYSICAL MEDICINE & REHABILITATION

## 2024-09-09 PROCEDURE — 95911 NRV CNDJ TEST 9-10 STUDIES: CPT | Performed by: PHYSICAL MEDICINE & REHABILITATION

## 2024-09-09 NOTE — PROGRESS NOTES
Patient ID: Sinai Mcbride is a 59 y.o. female.    Procedures    Electrodiagnostic testing reveals evidence of mild-to-moderate bilateral median mononeuropathy at the wrists, right slightly worse than left, consistent with carpal tunnel syndrome.    There is no evidence of a motor radiculopathy or myopathy in the bilateral upper limbs.

## 2024-09-12 ENCOUNTER — TREATMENT (OUTPATIENT)
Dept: PHYSICAL THERAPY | Facility: HOSPITAL | Age: 59
End: 2024-09-12
Payer: COMMERCIAL

## 2024-09-12 DIAGNOSIS — M54.12 CERVICAL RADICULOPATHY: Primary | ICD-10-CM

## 2024-09-12 DIAGNOSIS — M54.6 PAIN IN THORACIC SPINE: ICD-10-CM

## 2024-09-12 PROCEDURE — 97110 THERAPEUTIC EXERCISES: CPT | Mod: GP | Performed by: PHYSICAL THERAPIST

## 2024-09-12 PROCEDURE — 97140 MANUAL THERAPY 1/> REGIONS: CPT | Mod: GP | Performed by: PHYSICAL THERAPIST

## 2024-09-12 ASSESSMENT — PAIN - FUNCTIONAL ASSESSMENT: PAIN_FUNCTIONAL_ASSESSMENT: 0-10

## 2024-09-12 ASSESSMENT — PAIN SCALES - GENERAL: PAINLEVEL_OUTOF10: 5 - MODERATE PAIN

## 2024-09-12 NOTE — PROGRESS NOTES
"  Physical Therapy Treatment    Patient Name: Sinai Mcbride  MRN: [id number]  Today's Date: 9/12/2024  Time Calculation  Start Time: 1347  Stop Time: 1427  Time Calculation (min): 40 min        PT Therapeutic Procedures Time Entry  Manual Therapy Time Entry: 25  Therapeutic Exercise Time Entry: 15                Current Problem  1. Cervical radiculopathy  Follow Up In Physical Therapy      2. Pain in thoracic spine  Follow Up In Physical Therapy        Problem List Items Addressed This Visit             ICD-10-CM    Pain in thoracic spine M54.6    Cervical radiculopathy - Primary M54.12       General  Reason for Referral: neck pain  Referred By: Dr. Acosta  Past Medical History Relevant to Rehab: h/o LBP, enlarged liver    Subjective   Current Condition:   Better  Patient reports moving firewood yesterday which may have increased her pain today.    Performing HEP?: Yes    Precautions  Precautions  Medical Precautions: Fall precautions  Pain  Pain Assessment: 0-10  0-10 (Numeric) Pain Score: 5 - Moderate pain  Pain Location: Chest  Pain Orientation: Anterior, Right  Pain Radiating Towards: radiates to right shoulder    Objective     Treatments:  Therapeutic Exercise  Therapeutic Exercise Performed: Yes  Therapeutic Exercise Activity 2: SCM stretch 3x15\"  Therapeutic Exercise Activity 9: Sci Fit UBE lvl1 2' fwd/bwd  Therapeutic Exercise Activity 13: median nerve/carpal tunnel sliders  Therapeutic Exercise Activity 14: median nerve glide    Manual Therapy  Manual Therapy Performed: Yes  Manual Therapy Activity 1: IASTM upper traps, levator scap, paraspinals  Manual Therapy Activity 2: TPR upper trap    EDUCATION:   Individual(s) Educated: Patient  Education Provided: yes  Handout(s) Provided: Scanned into chart  Home Program: Access Code [email or web address]  Risk and Benefits Discussed with Patient/Caregiver/Other: Yes   Patient/Caregiver Demonstrated Understanding: Yes   Patient Response to Education: Patient/Caregiver verbalized " understanding of information and Patient/Caregiver performed return demonstration of exercises/activities    Assessment: Patient is progressing slowly towards her physical therapy goals. Her pain and radicular symptoms appear to be improving, but patient is very active which increases her pain.   PT Assessment  PT Assessment Results: Decreased strength, Decreased range of motion, Pain, Obesity  Rehab Prognosis: Good  Evaluation/Treatment Tolerance: Patient tolerated treatment well    Plan: Continue with POC. Progress exercises as tolerated.  OP PT Plan  Treatment/Interventions: Education/ Instruction, Hot pack, Cryotherapy, Manual therapy, Neuromuscular re-education, Therapeutic activities, Therapeutic exercises  PT Plan: Skilled PT  PT Frequency: 2 times per week  Duration: 5 weeks  Onset Date: 08/08/24  Certification Period Start Date: 08/30/24  Certification Period End Date: 10/04/24  Number of Treatments Authorized: 3 of 11  Rehab Potential: Good  Plan of Care Agreement: Patient    Goals:  Active       PT Problem       Patient will achieve bilateral cervical side bending ROM at least 30 degrees       Start:  08/30/24    Expected End:  10/04/24            Patient will achieve bilateral cervical rotation ROM at least 70 degrees       Start:  08/30/24    Expected End:  10/04/24            Patient will achieve cervical flexion ROM at least 60 degrees       Start:  08/30/24    Expected End:  10/04/24            Patient will achieve cervical extension ROM at least 40 degrees       Start:  08/30/24    Expected End:  10/04/24            Patient will achieve bilateral shoulder flexion strength of at least 4+/5       Start:  08/30/24    Expected End:  10/04/24            Patient will achieve bilateral shoulder abduction strength of at least 4+/5       Start:  08/30/24    Expected End:  10/04/24            Patient will demonstrate independence in home program for support of progression       Start:  08/30/24    Expected  End:  10/04/24            Patient will report pain of no more than 2/10 demonstrating a reduction of overall pain       Start:  08/30/24    Expected End:  10/04/24            Patient will show a significant change in NDI (40% to 30%) patient reported outcome tool to demonstrate subjective imporovement       Start:  08/30/24    Expected End:  10/04/24                 Mesfin Chowdhury, PT

## 2024-09-18 ENCOUNTER — TREATMENT (OUTPATIENT)
Dept: PHYSICAL THERAPY | Facility: HOSPITAL | Age: 59
End: 2024-09-18
Payer: COMMERCIAL

## 2024-09-18 DIAGNOSIS — M54.6 PAIN IN THORACIC SPINE: ICD-10-CM

## 2024-09-18 DIAGNOSIS — M54.12 CERVICAL RADICULOPATHY: Primary | ICD-10-CM

## 2024-09-18 PROCEDURE — 97110 THERAPEUTIC EXERCISES: CPT | Mod: GP | Performed by: PHYSICAL THERAPIST

## 2024-09-18 PROCEDURE — 97140 MANUAL THERAPY 1/> REGIONS: CPT | Mod: GP | Performed by: PHYSICAL THERAPIST

## 2024-09-18 ASSESSMENT — PAIN - FUNCTIONAL ASSESSMENT: PAIN_FUNCTIONAL_ASSESSMENT: 0-10

## 2024-09-18 ASSESSMENT — PAIN SCALES - GENERAL: PAINLEVEL_OUTOF10: 7

## 2024-09-19 ENCOUNTER — OFFICE VISIT (OUTPATIENT)
Dept: PAIN MEDICINE | Facility: CLINIC | Age: 59
End: 2024-09-19
Payer: COMMERCIAL

## 2024-09-19 VITALS
BODY MASS INDEX: 42.21 KG/M2 | RESPIRATION RATE: 16 BRPM | HEART RATE: 55 BPM | OXYGEN SATURATION: 95 % | SYSTOLIC BLOOD PRESSURE: 142 MMHG | DIASTOLIC BLOOD PRESSURE: 90 MMHG | HEIGHT: 60 IN | WEIGHT: 215 LBS

## 2024-09-19 DIAGNOSIS — G89.29 CHRONIC MIDLINE LOW BACK PAIN WITHOUT SCIATICA: ICD-10-CM

## 2024-09-19 DIAGNOSIS — G56.03 BILATERAL CARPAL TUNNEL SYNDROME: ICD-10-CM

## 2024-09-19 DIAGNOSIS — M54.50 CHRONIC MIDLINE LOW BACK PAIN WITHOUT SCIATICA: ICD-10-CM

## 2024-09-19 DIAGNOSIS — M51.24 THORACIC DISC HERNIATION: ICD-10-CM

## 2024-09-19 DIAGNOSIS — M51.37 DEGENERATION OF LUMBAR OR LUMBOSACRAL INTERVERTEBRAL DISC: Primary | ICD-10-CM

## 2024-09-19 DIAGNOSIS — M48.02 CERVICAL SPINAL STENOSIS: ICD-10-CM

## 2024-09-19 PROCEDURE — 99214 OFFICE O/P EST MOD 30 MIN: CPT | Performed by: ANESTHESIOLOGY

## 2024-09-19 PROCEDURE — 1036F TOBACCO NON-USER: CPT | Performed by: ANESTHESIOLOGY

## 2024-09-19 PROCEDURE — 3077F SYST BP >= 140 MM HG: CPT | Performed by: ANESTHESIOLOGY

## 2024-09-19 PROCEDURE — 3080F DIAST BP >= 90 MM HG: CPT | Performed by: ANESTHESIOLOGY

## 2024-09-19 PROCEDURE — 3008F BODY MASS INDEX DOCD: CPT | Performed by: ANESTHESIOLOGY

## 2024-09-19 RX ORDER — SODIUM CHLORIDE, SODIUM LACTATE, POTASSIUM CHLORIDE, CALCIUM CHLORIDE 600; 310; 30; 20 MG/100ML; MG/100ML; MG/100ML; MG/100ML
20 INJECTION, SOLUTION INTRAVENOUS CONTINUOUS
OUTPATIENT
Start: 2024-09-19

## 2024-09-19 RX ORDER — ETODOLAC 500 MG/1
500 TABLET, EXTENDED RELEASE ORAL DAILY
Qty: 30 TABLET | Refills: 0 | Status: SHIPPED | OUTPATIENT
Start: 2024-09-19 | End: 2024-10-19

## 2024-09-19 RX ORDER — METHOCARBAMOL 750 MG/1
750 TABLET, FILM COATED ORAL 4 TIMES DAILY
Qty: 120 TABLET | Refills: 2 | Status: SHIPPED | OUTPATIENT
Start: 2024-09-19 | End: 2024-10-19

## 2024-09-19 ASSESSMENT — ENCOUNTER SYMPTOMS
ENDOCRINE NEGATIVE: 1
CONSTITUTIONAL NEGATIVE: 1
MYALGIAS: 1
BACK PAIN: 1
ALLERGIC/IMMUNOLOGIC NEGATIVE: 1
HEMATOLOGIC/LYMPHATIC NEGATIVE: 1
PSYCHIATRIC NEGATIVE: 1
NECK PAIN: 1
EYES NEGATIVE: 1
CARDIOVASCULAR NEGATIVE: 1
GASTROINTESTINAL NEGATIVE: 1
RESPIRATORY NEGATIVE: 1
WEAKNESS: 1

## 2024-09-19 ASSESSMENT — PAIN - FUNCTIONAL ASSESSMENT: PAIN_FUNCTIONAL_ASSESSMENT: 0-10

## 2024-09-19 ASSESSMENT — PAIN SCALES - GENERAL
PAINLEVEL: 7
PAINLEVEL_OUTOF10: 7

## 2024-09-19 ASSESSMENT — PAIN DESCRIPTION - DESCRIPTORS: DESCRIPTORS: ACHING

## 2024-09-19 NOTE — PROGRESS NOTES
Subjective   Patient ID: Sinai Mcbride is a 59 y.o. female who presents for Neck Pain.  Neck Pain   Associated symptoms include weakness.   Patient here today for follow up today.  She reports her pain as high as a 7/10.  She was having issues with dizziness after her carrie and she had dental infection and issues.  She had to have urgent dental work completed and she is feeling much better.    She has found the lodine and the robaxin to be helpful for her and she was feeling better.  She was at PT for a few weeks and she has been backing down on the medications and her pain levels have increased again.  She would like to work with these medications potentially in different doses as it does make her feel better.  She would like to get set up for carpal tunnel injections as well as have a consultation with a hand surgeon.  She would like to also get set up for injections for her neck and her thoracic spine.  She has pain in her neck that radiates to her shoulders with range of motion of her neck.  She has a heaviness in her neck.  She also has thoracic spine pain that will radiate around to the right side.    Review of Systems   Constitutional: Negative.    HENT: Negative.     Eyes: Negative.    Respiratory: Negative.     Cardiovascular: Negative.    Gastrointestinal: Negative.    Endocrine: Negative.    Genitourinary: Negative.    Musculoskeletal:  Positive for back pain, myalgias and neck pain.   Skin: Negative.    Allergic/Immunologic: Negative.    Neurological:  Positive for weakness.   Hematological: Negative.    Psychiatric/Behavioral: Negative.         Objective   Physical Exam  Vitals and nursing note reviewed.   Constitutional:       Appearance: Normal appearance.   HENT:      Head: Normocephalic and atraumatic.      Right Ear: Ear canal and external ear normal.      Left Ear: Ear canal and external ear normal.      Nose: Nose normal.      Mouth/Throat:      Mouth: Mucous membranes are moist.      Pharynx:  Oropharynx is clear.   Eyes:      Conjunctiva/sclera: Conjunctivae normal.      Pupils: Pupils are equal, round, and reactive to light.   Cardiovascular:      Rate and Rhythm: Normal rate.   Pulmonary:      Effort: Pulmonary effort is normal. No respiratory distress.   Chest:      Chest wall: Tenderness present.       Musculoskeletal:        Arms:       Cervical back: Normal range of motion and neck supple.      Thoracic back: Swelling, spasms and tenderness present.      Lumbar back: Tenderness present. No deformity. Decreased range of motion.        Back:       Comments: There is palpable tenderness along the ribs in the high axillary line at approximately T4-T5 on the right.       Skin:     General: Skin is warm and dry.   Neurological:      Mental Status: She is alert and oriented to person, place, and time.      Sensory: Sensation is intact.      Motor: Weakness (right arm) present.      Coordination: Coordination is intact.      Gait: Gait is intact.      Deep Tendon Reflexes:      Reflex Scores:       Bicep reflexes are 2+ on the right side and 2+ on the left side.       Brachioradialis reflexes are 2+ on the right side and 2+ on the left side.     Comments: Rachel sign negative bilaterally   Psychiatric:         Mood and Affect: Mood normal.         Thought Content: Thought content normal.         Assessment/Plan   Problem List Items Addressed This Visit             ICD-10-CM    Degeneration of lumbar or lumbosacral intervertebral disc - Primary M51.37    Relevant Medications    methocarbamol (Robaxin) 750 mg tablet    etodolac XL (Lodine XL) 500 mg 24 hr tablet    Thoracic disc herniation M51.24    Relevant Medications    etodolac XL (Lodine XL) 500 mg 24 hr tablet    Other Relevant Orders    Epidural Steroid Injection    FL pain management     Other Visit Diagnoses         Codes    Bilateral carpal tunnel syndrome     G56.03    Relevant Medications    etodolac XL (Lodine XL) 500 mg 24 hr tablet    Other  Relevant Orders    Joint Injection/Aspiration    US guided pain procedure    Referral to Orthopaedic Surgery    Cervical spinal stenosis     M48.02    Relevant Medications    etodolac XL (Lodine XL) 500 mg 24 hr tablet    Other Relevant Orders    Epidural Steroid Injection    FL pain management    Chronic midline low back pain without sciatica     M54.50, G89.29    Relevant Orders    Referral to Physical Therapy          I nice discussion with the patient today our plan will be as follows.    Radiology: Cervical and thoracic MRIs were discussed in the office today.    Patient had moderate median neuropathy seen on EMG.  Physically: Patient is start physical therapy on her low back for the next 6 weeks.    Psychologically: No issues at this time.    Medication: I will increase Robaxin to 750 mg up to 4 times per day.  I will transition the patient over to Lodine  mg twice a day.    Duration: Greater than 1 year.    Intervention: Patient have the following interventions:  Bilateral carpal tunnel injections under ultrasound guidance in the office.  Risks, benefit, and alternatives of the procedure were discussed with the patient.  Oswestry score has been compelted and recorded.  C7-T1 epidural steroid injection under fluoroscopic guidance risks, benefit, and alternatives of the procedure were discussed with the patient.  Oswestry score has been compelted and recorded.   T2-3 epidural steroid injection under fluoroscopic guidance risks, benefit, and alternatives of the procedure were discussed with the patient.  Oswestry score has been compelted and recorded.    Reji Acosta MD 09/19/24 11:11 AM

## 2024-09-25 ENCOUNTER — APPOINTMENT (OUTPATIENT)
Dept: ALLERGY | Facility: CLINIC | Age: 59
End: 2024-09-25
Payer: COMMERCIAL

## 2024-09-26 ENCOUNTER — TELEPHONE (OUTPATIENT)
Dept: PAIN MEDICINE | Facility: CLINIC | Age: 59
End: 2024-09-26

## 2024-09-26 ENCOUNTER — TREATMENT (OUTPATIENT)
Dept: PHYSICAL THERAPY | Facility: HOSPITAL | Age: 59
End: 2024-09-26
Payer: COMMERCIAL

## 2024-09-26 DIAGNOSIS — M54.6 PAIN IN THORACIC SPINE: ICD-10-CM

## 2024-09-26 DIAGNOSIS — M54.12 CERVICAL RADICULOPATHY: Primary | ICD-10-CM

## 2024-09-26 PROCEDURE — 97110 THERAPEUTIC EXERCISES: CPT | Mod: GP | Performed by: PHYSICAL THERAPIST

## 2024-09-26 ASSESSMENT — PAIN SCALES - GENERAL: PAINLEVEL_OUTOF10: 7

## 2024-09-26 ASSESSMENT — PAIN - FUNCTIONAL ASSESSMENT: PAIN_FUNCTIONAL_ASSESSMENT: 0-10

## 2024-09-26 NOTE — TELEPHONE ENCOUNTER
Phone call from the pt stating she was referred to Dr. Gallego and scheduled an appt but they are requesting results be sent to their office. Spoke with the pt. EMG faxed to Dr. Gallego's office.

## 2024-09-26 NOTE — PROGRESS NOTES
"  Physical Therapy Treatment    Patient Name: Sinai Mcbride  MRN: [id number]  Today's Date: 9/26/2024  Time Calculation  Start Time: 1130  Stop Time: 1212  Time Calculation (min): 42 min        PT Therapeutic Procedures Time Entry  Manual Therapy Time Entry: 4  Therapeutic Exercise Time Entry: 38                Current Problem  1. Cervical radiculopathy  Follow Up In Physical Therapy      2. Pain in thoracic spine  Follow Up In Physical Therapy        Problem List Items Addressed This Visit             ICD-10-CM    Pain in thoracic spine M54.6    Cervical radiculopathy - Primary M54.12       General  Reason for Referral: neck pain  Referred By: Dr. Acosta  Past Medical History Relevant to Rehab: h/o LBP, enlarged liver    Subjective   Current Condition:   Same  Patient reports increased left shoulder pain today. Patient states that she did some house cleaning yesterday including moving her cooking stove.    Performing HEP?: Partially    Precautions  Precautions  Medical Precautions: Fall precautions  Pain  Pain Assessment: 0-10  0-10 (Numeric) Pain Score: 7  Pain Location: Shoulder  Pain Orientation: Right, Anterior    Objective     Treatments:  Therapeutic Exercise  Therapeutic Exercise Performed: Yes  Therapeutic Exercise Activity 2: SCM stretch 5x10\"  Therapeutic Exercise Activity 4: cervical retraction x10  Therapeutic Exercise Activity 5: cervical retraction with extension x10  Therapeutic Exercise Activity 6: B shoulder extension red x10  Therapeutic Exercise Activity 7: B scapular retraction Red x10  Therapeutic Exercise Activity 8: cervical extension SNAG x5  Therapeutic Exercise Activity 9: Sci Fit UBE lvl1 2' fwd/bwd  Therapeutic Exercise Activity 11: seated thoracic extension x10    Manual Therapy  Manual Therapy Performed: Yes  Manual Therapy Activity 1: IASTM upper traps    EDUCATION:   Individual(s) Educated: Patient  Education Provided: yes  Handout(s) Provided: Scanned into chart  Home Program: added " cervical extension active range of motion with strap  Risk and Benefits Discussed with Patient/Caregiver/Other: Yes   Patient/Caregiver Demonstrated Understanding: Yes   Patient Response to Education: Patient/Caregiver verbalized understanding of information and Patient/Caregiver performed return demonstration of exercises/activities    Assessment: Patient's radicular symptoms seem to be improving. She primarily has pain in her neck, shoulders, and chest. Patient demonstrated good tolerance to exercise and manual technique with no change in pain.  PT Assessment  PT Assessment Results: Decreased strength, Decreased range of motion, Pain, Obesity  Rehab Prognosis: Good  Evaluation/Treatment Tolerance: Patient tolerated treatment well    Plan: Continue with POC. Progress exercises as tolerated.  OP PT Plan  Treatment/Interventions: Education/ Instruction, Hot pack, Cryotherapy, Manual therapy, Neuromuscular re-education, Therapeutic activities, Therapeutic exercises  PT Plan: Skilled PT  PT Frequency: 2 times per week  Duration: 5 weeks  Onset Date: 08/08/24  Certification Period Start Date: 08/30/24  Certification Period End Date: 10/04/24  Number of Treatments Authorized: 5 of 11  Rehab Potential: Good  Plan of Care Agreement: Patient    Goals:  Active       PT Problem       Patient will achieve bilateral cervical side bending ROM at least 30 degrees       Start:  08/30/24    Expected End:  10/04/24            Patient will achieve bilateral cervical rotation ROM at least 70 degrees       Start:  08/30/24    Expected End:  10/04/24            Patient will achieve cervical flexion ROM at least 60 degrees       Start:  08/30/24    Expected End:  10/04/24            Patient will achieve cervical extension ROM at least 40 degrees       Start:  08/30/24    Expected End:  10/04/24            Patient will achieve bilateral shoulder flexion strength of at least 4+/5       Start:  08/30/24    Expected End:  10/04/24             Patient will achieve bilateral shoulder abduction strength of at least 4+/5       Start:  08/30/24    Expected End:  10/04/24            Patient will demonstrate independence in home program for support of progression       Start:  08/30/24    Expected End:  10/04/24            Patient will report pain of no more than 2/10 demonstrating a reduction of overall pain       Start:  08/30/24    Expected End:  10/04/24            Patient will show a significant change in NDI (40% to 30%) patient reported outcome tool to demonstrate subjective imporovement       Start:  08/30/24    Expected End:  10/04/24                 Mesfin Chowdhury, PT

## 2024-10-03 ENCOUNTER — TREATMENT (OUTPATIENT)
Dept: PHYSICAL THERAPY | Facility: HOSPITAL | Age: 59
End: 2024-10-03
Payer: COMMERCIAL

## 2024-10-03 DIAGNOSIS — M54.12 CERVICAL RADICULOPATHY: ICD-10-CM

## 2024-10-03 DIAGNOSIS — M54.6 PAIN IN THORACIC SPINE: ICD-10-CM

## 2024-10-03 PROCEDURE — 97140 MANUAL THERAPY 1/> REGIONS: CPT | Mod: GP,CQ

## 2024-10-03 PROCEDURE — 97110 THERAPEUTIC EXERCISES: CPT | Mod: GP,CQ

## 2024-10-03 ASSESSMENT — PAIN SCALES - GENERAL: PAINLEVEL_OUTOF10: 4

## 2024-10-03 ASSESSMENT — PAIN - FUNCTIONAL ASSESSMENT: PAIN_FUNCTIONAL_ASSESSMENT: 0-10

## 2024-10-03 NOTE — PROGRESS NOTES
"  Physical Therapy Treatment    Patient Name: Sinai Mcbride  MRN: [id number]  Today's Date: 10/3/2024  Time Calculation  Start Time: 1053 (Pt late)  Stop Time: 1135  Time Calculation (min): 42 min        PT Therapeutic Procedures Time Entry  Manual Therapy Time Entry: 10  Therapeutic Exercise Time Entry: 32                Current Problem  1. Cervical radiculopathy  Follow Up In Physical Therapy      2. Pain in thoracic spine  Follow Up In Physical Therapy          General  Reason for Referral: neck pain  Referred By: Dr. Acosta  Past Medical History Relevant to Rehab: h/o LBP, enlarged liver    Subjective   Current Condition:   Better  Patient reports she feels like the medication is helping control her pain.     Performing HEP?: Yes    Precautions  Precautions  Medical Precautions: Fall precautions  Pain  Pain Assessment: 0-10  0-10 (Numeric) Pain Score: 4  Pain Type: Chronic pain  Pain Location: Shoulder (chest and hands)  Pain Orientation: Right    Objective         Treatments:    Therapeutic Exercise  Therapeutic Exercise Performed: Yes  Therapeutic Exercise Activity 2: SCM stretch 5x15\"  Therapeutic Exercise Activity 3: standing open book x10 R/L  Therapeutic Exercise Activity 5: cervical retraction with extension x10  Therapeutic Exercise Activity 6: B shoulder extension red x10  Therapeutic Exercise Activity 7: B scapular retraction Red x15  Therapeutic Exercise Activity 8: cervical extension SNAG x5  Therapeutic Exercise Activity 9: Sci Fit UBE lvl1 2' fwd/bwd  Therapeutic Exercise Activity 10: B ER Yellow x10  Therapeutic Exercise Activity 11: seated thoracic extension x10         Manual Therapy  Manual Therapy Performed: Yes  Manual Therapy Activity 1: IASTM B UT and cervical paraspinals to decrease mm tension            EDUCATION:   Individual(s) Educated: Patient  Education Provided:   Risk and Benefits Discussed with Patient/Caregiver/Other: Yes   Patient/Caregiver Demonstrated Understanding: Yes "   Patient Response to Education: Patient/Caregiver verbalized understanding of information    Assessment: Pt noted some discomfort in the L shoulder with open book exercise to the R, instructed pt in modification to help reduce symptoms. Pt noted decreased tenderness with palpation to the L UT during IASTM compared to previous treatments. Pt reported UE fatigue at the end of session, however, no increased pain.   PT Assessment  PT Assessment Results: Decreased strength, Decreased range of motion, Pain, Obesity  Rehab Prognosis: Good    Plan: Continue to progress current POC as tolerated to facilitate ability to perform functional activities.   OP PT Plan  Treatment/Interventions: Education/ Instruction, Hot pack, Cryotherapy, Manual therapy, Neuromuscular re-education, Therapeutic activities, Therapeutic exercises  PT Plan: Skilled PT  PT Frequency: 2 times per week  Duration: 5 weeks  Onset Date: 08/08/24  Certification Period Start Date: 08/30/24  Certification Period End Date: 10/04/24  Number of Treatments Authorized: 6 of 11  Rehab Potential: Good  Plan of Care Agreement: Patient    Goals:  Active       PT Problem       Patient will achieve bilateral cervical side bending ROM at least 30 degrees       Start:  08/30/24    Expected End:  10/04/24            Patient will achieve bilateral cervical rotation ROM at least 70 degrees       Start:  08/30/24    Expected End:  10/04/24            Patient will achieve cervical flexion ROM at least 60 degrees       Start:  08/30/24    Expected End:  10/04/24            Patient will achieve cervical extension ROM at least 40 degrees       Start:  08/30/24    Expected End:  10/04/24            Patient will achieve bilateral shoulder flexion strength of at least 4+/5       Start:  08/30/24    Expected End:  10/04/24            Patient will achieve bilateral shoulder abduction strength of at least 4+/5       Start:  08/30/24    Expected End:  10/04/24            Patient will  demonstrate independence in home program for support of progression       Start:  08/30/24    Expected End:  10/04/24            Patient will report pain of no more than 2/10 demonstrating a reduction of overall pain       Start:  08/30/24    Expected End:  10/04/24            Patient will show a significant change in NDI (40% to 30%) patient reported outcome tool to demonstrate subjective imporovement       Start:  08/30/24    Expected End:  10/04/24                 Ten Rutherford, PTA

## 2024-10-08 DIAGNOSIS — M54.12 CERVICAL RADICULOPATHY: ICD-10-CM

## 2024-10-08 RX ORDER — AMITRIPTYLINE HYDROCHLORIDE 25 MG/1
TABLET, FILM COATED ORAL
Qty: 30 TABLET | Refills: 0 | Status: SHIPPED | OUTPATIENT
Start: 2024-10-08

## 2024-10-10 ENCOUNTER — TREATMENT (OUTPATIENT)
Dept: PHYSICAL THERAPY | Facility: HOSPITAL | Age: 59
End: 2024-10-10
Payer: COMMERCIAL

## 2024-10-10 DIAGNOSIS — M54.6 PAIN IN THORACIC SPINE: ICD-10-CM

## 2024-10-10 DIAGNOSIS — M54.12 CERVICAL RADICULOPATHY: Primary | ICD-10-CM

## 2024-10-10 PROCEDURE — 97110 THERAPEUTIC EXERCISES: CPT | Mod: GP | Performed by: PHYSICAL THERAPIST

## 2024-10-10 ASSESSMENT — PAIN - FUNCTIONAL ASSESSMENT: PAIN_FUNCTIONAL_ASSESSMENT: 0-10

## 2024-10-10 ASSESSMENT — PAIN SCALES - GENERAL: PAINLEVEL_OUTOF10: 4

## 2024-10-10 NOTE — PROGRESS NOTES
Physical Therapy Re-Evaluation and Treatment    Patient Name: Sinai Mcbride  MRN: [id number]  Today's Date: 10/10/2024  Time Calculation  Start Time: 1042  Stop Time: 1125  Time Calculation (min): 43 min        PT Therapeutic Procedures Time Entry  Therapeutic Exercise Time Entry: 43                Current Problem  1. Cervical radiculopathy  Follow Up In Physical Therapy      2. Pain in thoracic spine  Follow Up In Physical Therapy        Problem List Items Addressed This Visit             ICD-10-CM    Pain in thoracic spine M54.6    Cervical radiculopathy - Primary M54.12       General  Reason for Referral: neck pain  Referred By: Dr. Acosta  Past Medical History Relevant to Rehab: h/o LBP, enlarged liver    Subjective   Current Condition:   Better  Patient reports improved left hand pain after the injections. Patient states that her pain is improving, but she is still having chest pain/tightness.    Performing HEP?: Yes    Precautions  Precautions  Medical Precautions: Fall precautions  Pain  Pain Assessment: 0-10  0-10 (Numeric) Pain Score: 4  Pain Type: Chronic pain  Pain Location: Chest    Objective     Cervical AROM  Cervical flexion: (80°): 34  Cervical extension: (50°): 40  Cervical Rotation: (80°): 46  Cervical rotation left: (80°): 44  Cervical sidebend right: (45°): 17  Cervical sidebend left: (45°): 14    Shoulder AROM  R Shoulder flexion: (180°): WFL  L Shoulder flexion: (180°): WFL  R shoulder abduction: (180°): WFL  L Shoulder abduction: (180°): WFL  R Shoulder ER: (90°): WFL  L shoulder ER: (90°): WFL  R shoulder IR: (70°): WFL  L shoulder IR: (70°): WFL    Shoulder Strength  R shoulder flexion: (5/5): 4+/5  L shoulder flexion: (5/5): 4+/5  R shoulder abduction: (5/5): 4+/5  L shoulder abduction: (5/5): 4+/5  R shoulder ER: (5/5): 4+/5  L shoulder ER: (5/5): 4+/5  R shoulder IR: (5/5) : 4+/5  L shoulder IR: (5/5): 4+/5    Outcome Measures:  Other Measures  Neck Disability Index:  "40%    Treatments:  Therapeutic Exercise  Therapeutic Exercise Performed: Yes  Therapeutic Exercise Activity 2: SCM stretch 5x15\"  Therapeutic Exercise Activity 3: standing open book x10 R/L  Therapeutic Exercise Activity 4: cervical retraction x10  Therapeutic Exercise Activity 5: cervical retraction with extension x5  Therapeutic Exercise Activity 6: B shoulder extension grn x10  Therapeutic Exercise Activity 7: B scapular retraction grn x10  Therapeutic Exercise Activity 9: Sci Fit UBE lvl1 2' fwd/bwd  Therapeutic Exercise Activity 10: B ER Yellow x10         EDUCATION:   Individual(s) Educated: Patient  Education Provided: yes  Handout(s) Provided: Scanned into chart  Home Program: reviewed home exercise program   Risk and Benefits Discussed with Patient/Caregiver/Other: Yes   Patient/Caregiver Demonstrated Understanding: Yes   Patient Response to Education: Patient/Caregiver verbalized understanding of information and Patient/Caregiver performed return demonstration of exercises/activities    Assessment: Patient is progressing slowly towards her physical therapy goals. Patient's tolerance to exercises appears to be improving. Patient benefited from verbal cues for proper exercise technique and pacing of exercises with good follow through. Skilled physical therapy is warranted to address the above stated impairments, so the patient can perform functional activities and work duties without increased pain or difficulty.   PT Assessment  PT Assessment Results: Decreased strength, Decreased range of motion, Pain, Obesity  Rehab Prognosis: Good  Evaluation/Treatment Tolerance: Patient tolerated treatment well    Plan: Continue with POC. Progress exercises as tolerated.  OP PT Plan  Treatment/Interventions: Education/ Instruction, Hot pack, Cryotherapy, Manual therapy, Neuromuscular re-education, Therapeutic activities, Therapeutic exercises  PT Plan: Skilled PT  PT Frequency: 2 times per week  Duration: 5 " weeks  Onset Date: 08/08/24  Certification Period Start Date: 08/30/24  Certification Period End Date: 10/25/24  Number of Treatments Authorized: 7 of 11  Rehab Potential: Good  Plan of Care Agreement: Patient    Goals:  Active       PT Problem       Patient will achieve bilateral cervical side bending ROM at least 30 degrees (Not Progressing)       Start:  08/30/24    Expected End:  11/07/24            Patient will achieve bilateral cervical rotation ROM at least 70 degrees (Not Progressing)       Start:  08/30/24    Expected End:  11/07/24            Patient will achieve cervical flexion ROM at least 60 degrees (Not Progressing)       Start:  08/30/24    Expected End:  11/07/24            Patient will achieve cervical extension ROM at least 40 degrees (Progressing)       Start:  08/30/24    Expected End:  11/07/24            Patient will achieve bilateral shoulder flexion strength of at least 4+/5 (Met)       Start:  08/30/24    Expected End:  10/04/24    Resolved:  10/10/24         Patient will achieve bilateral shoulder abduction strength of at least 4+/5 (Met)       Start:  08/30/24    Expected End:  10/04/24    Resolved:  10/10/24         Patient will demonstrate independence in home program for support of progression (Met)       Start:  08/30/24    Expected End:  10/04/24    Resolved:  10/10/24         Patient will report pain of no more than 2/10 demonstrating a reduction of overall pain (Progressing)       Start:  08/30/24    Expected End:  11/07/24            Patient will show a significant change in NDI (40% to 30%) patient reported outcome tool to demonstrate subjective imporovement (Not Progressing)       Start:  08/30/24    Expected End:  11/07/24                 Mesfin Chowdhury, PT

## 2024-10-11 ENCOUNTER — HOSPITAL ENCOUNTER (OUTPATIENT)
Dept: GASTROENTEROLOGY | Facility: HOSPITAL | Age: 59
Discharge: HOME | End: 2024-10-11
Payer: COMMERCIAL

## 2024-10-11 VITALS
DIASTOLIC BLOOD PRESSURE: 80 MMHG | HEART RATE: 49 BPM | HEIGHT: 60 IN | TEMPERATURE: 97.7 F | SYSTOLIC BLOOD PRESSURE: 120 MMHG | OXYGEN SATURATION: 98 % | RESPIRATION RATE: 16 BRPM | BODY MASS INDEX: 39.27 KG/M2 | WEIGHT: 200 LBS

## 2024-10-11 DIAGNOSIS — M48.02 CERVICAL SPINAL STENOSIS: ICD-10-CM

## 2024-10-11 PROCEDURE — 62321 NJX INTERLAMINAR CRV/THRC: CPT | Performed by: ANESTHESIOLOGY

## 2024-10-11 PROCEDURE — 2500000005 HC RX 250 GENERAL PHARMACY W/O HCPCS: Performed by: ANESTHESIOLOGY

## 2024-10-11 PROCEDURE — 2500000004 HC RX 250 GENERAL PHARMACY W/ HCPCS (ALT 636 FOR OP/ED): Performed by: ANESTHESIOLOGY

## 2024-10-11 PROCEDURE — 2550000001 HC RX 255 CONTRASTS: Performed by: ANESTHESIOLOGY

## 2024-10-11 RX ORDER — LIDOCAINE HYDROCHLORIDE 20 MG/ML
INJECTION, SOLUTION EPIDURAL; INFILTRATION; INTRACAUDAL; PERINEURAL AS NEEDED
Status: COMPLETED | OUTPATIENT
Start: 2024-10-11 | End: 2024-10-11

## 2024-10-11 RX ORDER — SODIUM CHLORIDE 9 MG/ML
INJECTION, SOLUTION INTRAMUSCULAR; INTRAVENOUS; SUBCUTANEOUS AS NEEDED
Status: COMPLETED | OUTPATIENT
Start: 2024-10-11 | End: 2024-10-11

## 2024-10-11 RX ORDER — TRIAMCINOLONE ACETONIDE 40 MG/ML
INJECTION, SUSPENSION INTRA-ARTICULAR; INTRAMUSCULAR AS NEEDED
Status: COMPLETED | OUTPATIENT
Start: 2024-10-11 | End: 2024-10-11

## 2024-10-11 ASSESSMENT — ENCOUNTER SYMPTOMS
DEPRESSION: 0
LOSS OF SENSATION IN FEET: 0
OCCASIONAL FEELINGS OF UNSTEADINESS: 0

## 2024-10-11 ASSESSMENT — PAIN DESCRIPTION - DESCRIPTORS: DESCRIPTORS: SPASM

## 2024-10-11 ASSESSMENT — PAIN - FUNCTIONAL ASSESSMENT
PAIN_FUNCTIONAL_ASSESSMENT: 0-10
PAIN_FUNCTIONAL_ASSESSMENT: 0-10

## 2024-10-11 ASSESSMENT — PAIN SCALES - GENERAL
PAINLEVEL_OUTOF10: 5 - MODERATE PAIN
PAINLEVEL_OUTOF10: 0 - NO PAIN

## 2024-10-11 NOTE — DISCHARGE INSTRUCTIONS
DISCHARGE INSTRUCTIONS FOR INJECTIONS     You underwent Cervical Epidural Steroid Injection today    Aftermost injections, it is recommended that you relax and limit your activity for the remainder of the day unless you have been told otherwise by your pain physician.  You should not drive a car, operate machinery, or make important legal decisions unless otherwise directed by your pain physician.  You may resume your normal activity, including exercise, tomorrow.      Keep a written pain diary of how much pain relief you experienced following the injection procedure and the length of time of pain relief you experienced pain relief. Following diagnostic injections like medial branch nerve blocks, sacroiliac joint blocks, stellate ganglion injections and other blocks, it is very important you record the specific amount of pain relief you experienced immediately after the injectionand how long it lasted. Your doctor will ask you for this information at your follow up visit.     For all injections, please keep the injection site dry and inspect the site for a couple of days. You may remove the Band-Aid the day of the injection at any time.     Some discomfort, bruising or slight swelling may occur at the injection site. This is not abnormal if it occurs.  If needed you may:    -Take over the counter medication such as Tylenol or Motrin.   -Apply an ice pack for 30 minutes, 2 to 3 times a day for the first 24 hours.     You may shower today; no soaking baths, hot tubs, whirlpools or swimming pools for two days.      If you are given steroids in your injection, it may take 3-5 days for the steroid medication to take effect. You may notice a worsening of your symptoms for 1-2 days after the injection. This is not abnormal.  You may use acetaminophen, ibuprofen, or prescription medication that your doctor may have prescribed for you if you need to do so.     A few common side effects of steroids include facial flushing,  sweating, restlessness, irritability,difficulty sleeping, increase in blood sugar, and increased blood pressure. If you have diabetes, please monitor your blood sugar at least once a day for at least 5 days. If you have poorly controlled high blood pressure, monitoryour blood pressure for at least 2 days and contact your primary care physician if these numbers are unusually high for you.      If you take aspirin or non-steroidal anti-inflammatory drugs (examples are Motrin, Advil, ibuprofen, Naprosyn, Voltaren, Relafen, etc.) you may restart these this evening, but stop taking it 3 days before your next appointment, unless instructed otherwiseby your physician.      You do not need to discontinue non-aspirin-containing pain medications prior to an injection (examples: Celebrex, tramadol, hydrocodone and acetaminophen).      If you take a blood thinning medication (Coumadin, Lovenox, Fragmin,Ticlid, Plavix, Pradaxa, etc.), please discuss this with your primary care physician/cardiologist and your pain physician. These medications MUST be discontinued before you can have an injection safely, without the risk of uncontrolled bleeding. If these medications are not discontinued for an appropriate period of time, you will not be able to receivean injection.      If you are taking Coumadin, please have your INR checked the morning of your procedure and bringthe result to your appointment unless otherwise instructed. If your INR is over 1.2, your injection may need to be rescheduled to avoid uncontrolled bleeding from the needle placement.     Call [facility] at [phone] between 8am-4pm Monday - Friday if you are experiencing the following:    If you received an epidural or spinal injection:    -Headache that doesnot go away with medicine, is worse when sitting or standing up, and is greatly relieved upon lying down.   -Severe pain worse than or different than your baseline pain.   -Chills or fever  (101º F or greater).   -Drainage or signs of infection at the injection site     Go directly to the Emergency Department if you are experiencing the following and received an epidural or spinal injection:   -Abrupt weakness or progressive weakness in your legs that starts after you leave the clinic.   -Abrupt severe or worsening numbness in your legs.   -Inability to urinate after the injection or loss of bowel or bladder control without the urge to defecate or urinate.     If you have a clinical question that cannot wait until your next appointment, please call [phone] between 8am-4pm Monday - Friday or send a [employer] message. We do our best to return all non-emergency messages within 24 hours, Monday - Friday. A nurse or physician will return your message.      If you need to cancel an appointment, please call the scheduling staff at [phone] during normal business hours or leave a message at least 24 hours in advance.     If you are going to be sedated for your next procedure, you MUST have responsible adult who can legally drive accompany you home. You cannot eat or drink for eight hours prior to the planned procedure if you are going to receive sedation. You may take your non-blood thinning medications with a small sip of water.

## 2024-10-19 DIAGNOSIS — M51.24 THORACIC DISC HERNIATION: ICD-10-CM

## 2024-10-19 DIAGNOSIS — M48.02 CERVICAL SPINAL STENOSIS: ICD-10-CM

## 2024-10-19 DIAGNOSIS — M51.379 DEGENERATION OF LUMBAR OR LUMBOSACRAL INTERVERTEBRAL DISC: ICD-10-CM

## 2024-10-19 DIAGNOSIS — G56.03 BILATERAL CARPAL TUNNEL SYNDROME: ICD-10-CM

## 2024-10-21 RX ORDER — ETODOLAC 500 MG/1
500 TABLET, EXTENDED RELEASE ORAL DAILY
Qty: 30 TABLET | Refills: 0 | Status: SHIPPED | OUTPATIENT
Start: 2024-10-21

## 2024-10-23 ENCOUNTER — TREATMENT (OUTPATIENT)
Dept: PHYSICAL THERAPY | Facility: HOSPITAL | Age: 59
End: 2024-10-23
Payer: COMMERCIAL

## 2024-10-23 DIAGNOSIS — M54.6 PAIN IN THORACIC SPINE: ICD-10-CM

## 2024-10-23 DIAGNOSIS — M54.12 CERVICAL RADICULOPATHY: ICD-10-CM

## 2024-10-23 PROCEDURE — 97110 THERAPEUTIC EXERCISES: CPT | Mod: GP,CQ

## 2024-10-23 ASSESSMENT — PAIN DESCRIPTION - DESCRIPTORS: DESCRIPTORS: SHOOTING

## 2024-10-23 ASSESSMENT — PAIN SCALES - GENERAL: PAINLEVEL_OUTOF10: 3

## 2024-10-23 ASSESSMENT — PAIN - FUNCTIONAL ASSESSMENT: PAIN_FUNCTIONAL_ASSESSMENT: 0-10

## 2024-10-23 NOTE — PROGRESS NOTES
"  Physical Therapy Treatment    Patient Name: Sinai Mcbride  MRN: [id number]  Today's Date: 10/23/2024  Time Calculation  Start Time: 1347  Stop Time: 1425  Time Calculation (min): 38 min        PT Therapeutic Procedures Time Entry  Therapeutic Exercise Time Entry: 38                Current Problem  1. Cervical radiculopathy  Follow Up In Physical Therapy      2. Pain in thoracic spine  Follow Up In Physical Therapy          General  Reason for Referral: neck pain  Referred By: Dr. Acosta  Past Medical History Relevant to Rehab: h/o LBP, enlarged liver    Subjective   Current Condition:   Better  Patient reports she is having less pain in her neck and shoulders since the injections. Continues to have pain down the R forearm and thumb. States she continues to feel weak.     Performing HEP?: Yes    Precautions  Precautions  Medical Precautions: Fall precautions  Pain  Pain Assessment: 0-10  0-10 (Numeric) Pain Score: 3  Pain Location: Arm (and Hand)  Pain Orientation: Right  Pain Descriptors: Shooting    Objective         Treatments:    Therapeutic Exercise  Therapeutic Exercise Performed: Yes  Therapeutic Exercise Activity 2: SCM stretch 5x15\"  Therapeutic Exercise Activity 3: standing open book x10 R/L  Therapeutic Exercise Activity 4: cervical retraction x10  Therapeutic Exercise Activity 5: cervical retraction with extension x5  Therapeutic Exercise Activity 6: B shoulder extension grn x10  Therapeutic Exercise Activity 7: B scapular retraction grn x10  Therapeutic Exercise Activity 8: B shoulder flexion 1# x10  Therapeutic Exercise Activity 9: Sci Fit UBE lvl1 2' fwd/bwd  Therapeutic Exercise Activity 10: B ER Yellow x10  Therapeutic Exercise Activity 11: seated thoracic extension x10  Therapeutic Exercise Activity 12: Doorway pec stretch x2 30\"  Therapeutic Exercise Activity 13: B horizontal ABD Yellow x8            EDUCATION:   Individual(s) Educated: Patient  Education Provided: rows, extension, horizontal " abduction, bilateral ER with yellow and green t-band  Risk and Benefits Discussed with Patient/Caregiver/Other: Yes   Patient/Caregiver Demonstrated Understanding: Yes   Patient Response to Education: Patient/Caregiver verbalized understanding of information and Patient/Caregiver performed return demonstration of exercises/activities    Assessment: Pt noted a sharp pain in the L shoulder with last rep of bilateral shoulder flexion which diminished once completed. Pt demonstrated good technique with strengthening exercises issued for HEP, provided handout for reference. Pt reported no change with pain level at end of session.   PT Assessment  PT Assessment Results: Decreased strength, Decreased range of motion, Pain, Obesity  Rehab Prognosis: Good    Plan: Continue to progress current POC as tolerated to facilitate ability to perform functional activities.   OP PT Plan  Treatment/Interventions: Education/ Instruction, Hot pack, Cryotherapy, Manual therapy, Neuromuscular re-education, Therapeutic activities, Therapeutic exercises  PT Plan: Skilled PT  PT Frequency: 2 times per week  Duration: 5 weeks  Onset Date: 08/08/24  Certification Period Start Date: 08/30/24  Certification Period End Date: 10/25/24  Number of Treatments Authorized: 8 of 11  Rehab Potential: Good  Plan of Care Agreement: Patient    Goals:  Active       PT Problem       Patient will achieve bilateral cervical side bending ROM at least 30 degrees (Not Progressing)       Start:  08/30/24    Expected End:  11/07/24            Patient will achieve bilateral cervical rotation ROM at least 70 degrees (Not Progressing)       Start:  08/30/24    Expected End:  11/07/24            Patient will achieve cervical flexion ROM at least 60 degrees (Not Progressing)       Start:  08/30/24    Expected End:  11/07/24            Patient will achieve cervical extension ROM at least 40 degrees (Progressing)       Start:  08/30/24    Expected End:  11/07/24             Patient will achieve bilateral shoulder flexion strength of at least 4+/5 (Met)       Start:  08/30/24    Expected End:  10/04/24    Resolved:  10/10/24         Patient will achieve bilateral shoulder abduction strength of at least 4+/5 (Met)       Start:  08/30/24    Expected End:  10/04/24    Resolved:  10/10/24         Patient will demonstrate independence in home program for support of progression (Met)       Start:  08/30/24    Expected End:  10/04/24    Resolved:  10/10/24         Patient will report pain of no more than 2/10 demonstrating a reduction of overall pain (Progressing)       Start:  08/30/24    Expected End:  11/07/24            Patient will show a significant change in NDI (40% to 30%) patient reported outcome tool to demonstrate subjective imporovement (Not Progressing)       Start:  08/30/24    Expected End:  11/07/24                 Ten Rutherford, PTA

## 2024-10-25 ENCOUNTER — TREATMENT (OUTPATIENT)
Dept: PHYSICAL THERAPY | Facility: HOSPITAL | Age: 59
End: 2024-10-25
Payer: COMMERCIAL

## 2024-10-25 DIAGNOSIS — M54.12 CERVICAL RADICULOPATHY: Primary | ICD-10-CM

## 2024-10-25 DIAGNOSIS — J45.20 MILD INTERMITTENT ASTHMA WITHOUT COMPLICATION (HHS-HCC): ICD-10-CM

## 2024-10-25 DIAGNOSIS — M54.6 PAIN IN THORACIC SPINE: ICD-10-CM

## 2024-10-25 DIAGNOSIS — F41.9 ANXIETY: Primary | ICD-10-CM

## 2024-10-25 PROCEDURE — 97110 THERAPEUTIC EXERCISES: CPT | Mod: GP | Performed by: PHYSICAL THERAPIST

## 2024-10-25 ASSESSMENT — PAIN SCALES - GENERAL: PAINLEVEL_OUTOF10: 3

## 2024-10-25 ASSESSMENT — PAIN - FUNCTIONAL ASSESSMENT: PAIN_FUNCTIONAL_ASSESSMENT: 0-10

## 2024-10-25 NOTE — PROGRESS NOTES
Physical Therapy Re-Evaluation and Treatment    Patient Name: Sinai Mcbride  MRN: [id number]  Today's Date: 10/25/2024  Time Calculation  Start Time: 1502  Stop Time: 1540  Time Calculation (min): 38 min        PT Therapeutic Procedures Time Entry  Therapeutic Exercise Time Entry: 38       Current Problem  1. Cervical radiculopathy  Follow Up In Physical Therapy      2. Pain in thoracic spine  Follow Up In Physical Therapy        Problem List Items Addressed This Visit             ICD-10-CM    Pain in thoracic spine M54.6    Cervical radiculopathy - Primary M54.12       General  Reason for Referral: neck pain  Referred By: Dr. Acosta  Past Medical History Relevant to Rehab: h/o LBP, enlarged liver    Subjective   Current Condition:   Better  Patient reports that the ache in her chest and shoulders is improved. Patient states that she continues to get sharp pain in her bilateral hands, but the right is worse then the left.    Performing HEP?: Yes    Precautions  Precautions  Medical Precautions: Fall precautions  Pain  Pain Assessment: 0-10  0-10 (Numeric) Pain Score: 3  Pain Location: Arm (and hand)  Pain Orientation: Right    Objective     Cervical AROM  Cervical flexion: (80°): 33  Cervical extension: (50°): 55  Cervical rotation right: (80°): 67  Cervical rotation left: (80°): 60  Cervical sidebend right: (45°): 20  Cervical sidebend left: (45°): 18    Outcome Measures:  Other Measures  Neck Disability Index: 50%    Treatments:  Reviewed home exercise program. Adjusted frequency of exercises to improve patient's function.    EDUCATION:   Individual(s) Educated: Patient  Education Provided: yes  Handout(s) Provided: Scanned into chart  Home Program: reviewed home exercise program.    Risk and Benefits Discussed with Patient/Caregiver/Other: Yes   Patient/Caregiver Demonstrated Understanding: Yes   Patient Response to Education: Patient/Caregiver verbalized understanding of information, Patient/Caregiver performed  return demonstration of exercises/activities, and Patient/Caregiver asked appropriate questions    Assessment: Patient is progressing slowly with physical therapy. Her cervical rotation and extension is improving nicely. Her cervical flexion and side flexion are progressing more slowly. Patient's complaints of left upper extremity radicular symptoms are improving, but her right upper extremity continues to give her pain. Skilled physical therapy is warranted to address the above stated impairments, so the patient can perform functional activities and work duties without increased pain or difficulty.   PT Assessment  PT Assessment Results: Decreased strength, Decreased range of motion, Pain, Obesity  Rehab Prognosis: Good  Evaluation/Treatment Tolerance: Patient tolerated treatment well    Plan: Continue with POC. Progress exercises as tolerated.  OP PT Plan  Treatment/Interventions: Education/ Instruction, Hot pack, Cryotherapy, Manual therapy, Neuromuscular re-education, Therapeutic activities, Therapeutic exercises  PT Plan: Skilled PT  PT Frequency: 1 time per week  Duration: 6 weeks  Onset Date: 08/08/24  Certification Period Start Date: 08/30/24  Certification Period End Date: 10/25/24  Number of Treatments Authorized: 9 of 11 (awaiting auth for continued PT)  Rehab Potential: Good  Plan of Care Agreement: Patient    Goals:  Active       PT Problem       Patient will achieve bilateral cervical side bending ROM at least 30 degrees (Progressing)       Start:  08/30/24    Expected End:  12/06/24            Patient will achieve bilateral cervical rotation ROM at least 70 degrees (Progressing)       Start:  08/30/24    Expected End:  12/06/24            Patient will achieve cervical flexion ROM at least 60 degrees (Not Progressing)       Start:  08/30/24    Expected End:  12/06/24            Patient will achieve cervical extension ROM at least 40 degrees (Met)       Start:  08/30/24    Expected End:  11/07/24     Resolved:  10/25/24         Patient will achieve bilateral shoulder flexion strength of at least 4+/5 (Met)       Start:  08/30/24    Expected End:  10/04/24    Resolved:  10/10/24         Patient will achieve bilateral shoulder abduction strength of at least 4+/5 (Met)       Start:  08/30/24    Expected End:  10/04/24    Resolved:  10/10/24         Patient will demonstrate independence in home program for support of progression (Met)       Start:  08/30/24    Expected End:  10/04/24    Resolved:  10/10/24         Patient will report pain of no more than 2/10 demonstrating a reduction of overall pain (Progressing)       Start:  08/30/24    Expected End:  12/06/24            Patient will show a significant change in NDI (40% to 30%) patient reported outcome tool to demonstrate subjective imporovement (Not Progressing)       Start:  08/30/24    Expected End:  12/06/24            Patient will tolerate yard work without increased radicular symptoms.       Start:  10/25/24    Expected End:  12/06/24                     Mesfin Chowdhury, PT

## 2024-10-31 RX ORDER — MOMETASONE FUROATE AND FORMOTEROL FUMARATE DIHYDRATE 200; 5 UG/1; UG/1
2 AEROSOL RESPIRATORY (INHALATION) 2 TIMES DAILY
Qty: 13 G | Refills: 6 | Status: SHIPPED | OUTPATIENT
Start: 2024-10-31

## 2024-10-31 RX ORDER — FLUOXETINE HYDROCHLORIDE 20 MG/1
20 CAPSULE ORAL DAILY
Qty: 90 CAPSULE | Refills: 3 | Status: SHIPPED | OUTPATIENT
Start: 2024-10-31

## 2024-11-04 ENCOUNTER — OFFICE VISIT (OUTPATIENT)
Dept: PAIN MEDICINE | Facility: CLINIC | Age: 59
End: 2024-11-04
Payer: COMMERCIAL

## 2024-11-04 VITALS
OXYGEN SATURATION: 96 % | HEIGHT: 60 IN | BODY MASS INDEX: 39.27 KG/M2 | WEIGHT: 200 LBS | HEART RATE: 54 BPM | SYSTOLIC BLOOD PRESSURE: 149 MMHG | RESPIRATION RATE: 18 BRPM | DIASTOLIC BLOOD PRESSURE: 82 MMHG

## 2024-11-04 DIAGNOSIS — M54.12 CERVICAL RADICULOPATHY: Primary | ICD-10-CM

## 2024-11-04 DIAGNOSIS — M51.24 THORACIC DISC HERNIATION: ICD-10-CM

## 2024-11-04 PROCEDURE — 99213 OFFICE O/P EST LOW 20 MIN: CPT | Performed by: PHYSICIAN ASSISTANT

## 2024-11-04 PROCEDURE — 3008F BODY MASS INDEX DOCD: CPT | Performed by: PHYSICIAN ASSISTANT

## 2024-11-04 PROCEDURE — 3077F SYST BP >= 140 MM HG: CPT | Performed by: PHYSICIAN ASSISTANT

## 2024-11-04 PROCEDURE — 3079F DIAST BP 80-89 MM HG: CPT | Performed by: PHYSICIAN ASSISTANT

## 2024-11-04 ASSESSMENT — LIFESTYLE VARIABLES
HOW MANY STANDARD DRINKS CONTAINING ALCOHOL DO YOU HAVE ON A TYPICAL DAY: PATIENT DOES NOT DRINK
HOW OFTEN DO YOU HAVE A DRINK CONTAINING ALCOHOL: NEVER
AUDIT-C TOTAL SCORE: 0
SKIP TO QUESTIONS 9-10: 1
HOW OFTEN DO YOU HAVE SIX OR MORE DRINKS ON ONE OCCASION: NEVER

## 2024-11-04 ASSESSMENT — PATIENT HEALTH QUESTIONNAIRE - PHQ9
1. LITTLE INTEREST OR PLEASURE IN DOING THINGS: NOT AT ALL
SUM OF ALL RESPONSES TO PHQ9 QUESTIONS 1 & 2: 0
2. FEELING DOWN, DEPRESSED OR HOPELESS: NOT AT ALL

## 2024-11-04 ASSESSMENT — PAIN SCALES - GENERAL
PAINLEVEL_OUTOF10: 5 - MODERATE PAIN
PAINLEVEL_OUTOF10: 5

## 2024-11-04 ASSESSMENT — ENCOUNTER SYMPTOMS
CARDIOVASCULAR NEGATIVE: 1
CONSTITUTIONAL NEGATIVE: 1
ALLERGIC/IMMUNOLOGIC NEGATIVE: 1
NECK PAIN: 1
EYES NEGATIVE: 1
PAIN: 1
HEMATOLOGIC/LYMPHATIC NEGATIVE: 1
GASTROINTESTINAL NEGATIVE: 1
ENDOCRINE NEGATIVE: 1
MYALGIAS: 1
PSYCHIATRIC NEGATIVE: 1
WEAKNESS: 1
BACK PAIN: 1
RESPIRATORY NEGATIVE: 1

## 2024-11-04 ASSESSMENT — PAIN - FUNCTIONAL ASSESSMENT: PAIN_FUNCTIONAL_ASSESSMENT: 0-10

## 2024-11-04 NOTE — PROGRESS NOTES
Subjective   Patient ID: Sinai Mcbride is a 59 y.o. female who presents for Pain.  Patient is a 59-year-old female the presents today after her cervical epidural injection.  Patient notes that she got about 70% relief but only last for about 2 weeks.  She did notes that her pain is currently a 5 out of 10 when it was a 7 out of 10 in this region.  She does note that her bilateral hands have been aggravated by her physical activity so does the neck trapezius and mid back regions.  Patient does do heavy physical type work and this is starting to impact her ability to function.  Her thoracic injection is due on the ninth    Pain  Associated symptoms include weakness.       Review of Systems   Constitutional: Negative.    HENT: Negative.     Eyes: Negative.    Respiratory: Negative.     Cardiovascular: Negative.    Gastrointestinal: Negative.    Endocrine: Negative.    Genitourinary: Negative.    Musculoskeletal:  Positive for back pain, myalgias and neck pain.   Skin: Negative.    Allergic/Immunologic: Negative.    Neurological:  Positive for weakness.   Hematological: Negative.    Psychiatric/Behavioral: Negative.         Objective   Physical Exam  Vitals reviewed.   Constitutional:       Appearance: Normal appearance.   HENT:      Head: Normocephalic and atraumatic.      Mouth/Throat:      Mouth: Mucous membranes are moist.   Neck:      Vascular: No JVD.   Pulmonary:      Effort: Pulmonary effort is normal. No tachypnea or bradypnea.   Abdominal:      Palpations: Abdomen is soft.   Musculoskeletal:      Cervical back: Decreased range of motion.      Thoracic back: Decreased range of motion.   Skin:     General: Skin is warm and dry.   Neurological:      Mental Status: She is alert and oriented to person, place, and time.   Psychiatric:         Mood and Affect: Mood normal.         Behavior: Behavior normal. Behavior is cooperative.       Assessment/Plan   Problem List Items Addressed This Visit    None    The patient  did receive good relief from her symptoms I think that unfortunately her physical activity seems to have exacerbated her symptoms.  Did talk about the continued irritation be a cause of an arthritic ache versus radiculopathy.  Although there is some herniations and some stenosis I think a repeat might be prudent but we will have to hold off at this time.  Also believe the thoracic injection will provide patient with additional relief and improvement in her quality of function       Juan Rider PA-C 11/04/24 2:01 PM

## 2024-11-06 ENCOUNTER — EVALUATION (OUTPATIENT)
Dept: PHYSICAL THERAPY | Facility: HOSPITAL | Age: 59
End: 2024-11-06
Payer: COMMERCIAL

## 2024-11-06 DIAGNOSIS — M54.50 CHRONIC MIDLINE LOW BACK PAIN WITHOUT SCIATICA: Primary | ICD-10-CM

## 2024-11-06 DIAGNOSIS — G89.29 CHRONIC MIDLINE LOW BACK PAIN WITHOUT SCIATICA: Primary | ICD-10-CM

## 2024-11-06 PROCEDURE — 97161 PT EVAL LOW COMPLEX 20 MIN: CPT | Mod: GP | Performed by: PHYSICAL THERAPIST

## 2024-11-06 PROCEDURE — 97110 THERAPEUTIC EXERCISES: CPT | Mod: GP | Performed by: PHYSICAL THERAPIST

## 2024-11-06 ASSESSMENT — PAIN - FUNCTIONAL ASSESSMENT: PAIN_FUNCTIONAL_ASSESSMENT: 0-10

## 2024-11-06 ASSESSMENT — PAIN SCALES - GENERAL: PAINLEVEL_OUTOF10: 3

## 2024-11-06 ASSESSMENT — PAIN DESCRIPTION - DESCRIPTORS: DESCRIPTORS: THROBBING

## 2024-11-06 ASSESSMENT — ENCOUNTER SYMPTOMS
LOSS OF SENSATION IN FEET: 0
OCCASIONAL FEELINGS OF UNSTEADINESS: 1
DEPRESSION: 1

## 2024-11-06 NOTE — PROGRESS NOTES
Physical Therapy  Physical Therapy Orthopedic Evaluation and Treatment    Patient Name: Sinai Mcbride  MRN: [id number]  Today's Date: 11/6/2024  Time Calculation  Start Time: 1430  Stop Time: 1514  Time Calculation (min): 44 min    PT Evaluation Time Entry  PT Evaluation (Low) Time Entry: 30  PT Therapeutic Procedures Time Entry  Manual Therapy Time Entry: 4  Therapeutic Exercise Time Entry: 10     Insurance:  Visit number: 1 of 5  Authorization info: no auth required  [facility] / Plan: [employer] / Product Type: *No Product type* /     Current Problem  Problem List Items Addressed This Visit    None  Visit Diagnoses         Codes    Chronic midline low back pain without sciatica    -  Primary M54.50, G89.29    Relevant Orders    Follow Up In Physical Therapy          1. Chronic midline low back pain without sciatica  Referral to Physical Therapy    Follow Up In Physical Therapy          General:  General  Reason for Referral: LBP  Referred By: Dr. Acosta  Past Medical History Relevant to Rehab: cervical radiculopathy, thoracic spine pain, chronic pain of right upper extremity, degeneration of lumbar or lumbosacral intervertebral disc, thoracic disc herniation, insomnia, anxiety, PTSD      Precautions:   Precautions  [facility] (The score of 4 or more indicates an increased risk of falling): 8  Medical Precautions: Fall precautions    Medical History Form: Reviewed (scanned into chart)    Subjective:   Subjective   Chief Complaint: Patient is a 59 year old female who presents to clinic with complaints of low back pain. Patient has a prior medical history including: cervical radiculopathy, thoracic spine pain, chronic pain of right upper extremity, degeneration of lumbar or lumbosacral intervertebral disc, thoracic disc herniation, insomnia, anxiety, PTSD.  Onset Date: 9/19/2024  GRACY: Chronic    Current Condition:   Worse    Pain:  Pain Assessment: 0-10  0-10 (Numeric) Pain Score: 3  Pain  Location: Leg  Pain Orientation: Right  Pain Radiating Towards: right knee  Pain Descriptors: Throbbing  Highest: 6/10 pain  Lowest: 2/10 pain  Aggravating Factors:  Walking and Carrying  Relieving Factors:  Rest, Elevation, and Ice    Relevant Information (PMH & Previous Tests/Imaging): none  Previous Interventions/Treatments: Physical Therapy, Injections, and Chiropractic    Prior Level of Function (PLOF)  Patient previously independent with all ADLs  Exercise/Physical Activity: yard/housework  Work/School: part-time  Hobbies: Play on her phone    Patients Living Environment: Reviewed and no concern    Primary Language: English    Patient's Goal(s) for Therapy: decrease pain, improve flexibility    Red Flags: Do you have any of the following? No  Fever/chills, unexplained weight changes, dizziness/fainting, unexplained change in bowel or bladder functions, unexplained malaise or muscle weakness, night pain/sweats, numbness or tingling    Objective:  Objective     Lumbar AROM  Lumbar flexion: (60°): min restriction  Lumbar extension (25°): min restriction  Lumbar rotation right (30°): WFL  Lumbar rotation left (30°): WFL  Lumbar sidebend right (25°): min restriction  Lumbar sidebend left (25°): min restriction    Hip PROM  R hip flexion: (125°): WFL  L hip flexion: (125°): WFL  R hip abduction: (45°): WFL  L hip abduction: (45°): WFL  R hip ER: (45°): WFL  L hip ER: (45°): WFL  R hip IR: (45°): WFL  L hip IR: (45°): WFL    Specific Lower Extremity MMT  R Iliopsoas: (5/5): 4/5  L Iliopsoas: (5/5): 4/5  R Gluteals (sidelying): (5/5): 4-/5  L Gluteals (sidelying): (5/5): 4/5    Knee AROM  Knee AROM WFL: yes    Knee MMT  R knee flexion: (5/5): 4+/5  L knee flexion: (5/5): 4+/5  R knee extension: (5/5): 4+/5  L knee extension: (5/5): 4+/5      Functional Screening    Lower Extremity Functional Movements  Transfers: Independent  Gait: none  Assistive Device: no device  DL Squat: able to perform without bilateral upper  extremities   SL Squat: not tested     Balance  Not tested     Palpation: increased muscle tension to right glut/piriformis    Joint Mobility: hypomobile lumbar and thoracic spine    Flexibility: impaired hamstring flexibility        Special Tests    Response to repeated L/S movements for directional preference: none  Leg Length Discrepancy: -  SI Alignment: WFL  Hip Scouring: -  SI Compression Test: -  SI Distraction Test: -  90-90 SLR Test: -  Chaka Test: -  SUJEY Test: -  Slump Test: -    Treatment Performed:  Therapeutic Exercise  Therapeutic Exercise Performed: Yes  Therapeutic Exercise Activity 1: LTR x5  Therapeutic Exercise Activity 2: bridge x3  Therapeutic Exercise Activity 3: h/l hip add x5  Therapeutic Exercise Activity 4: PPT x5    Manual Therapy  Manual Therapy Performed: Yes  Manual Therapy Activity 1: PA mobs of right SI joint    Outcome Measures:  Other Measures  Oswestry Disablity Index (NATE): 50%     EDUCATION:   Individual(s) Educated: patient   Education Provided: Home exercise program, plan of care, activity modifications, pain management, and injury pathology  Handout(s) Provided: Scanned into chart  Home Program: Access Code: [email or web address]  Risk and Benefits Discussed with Patient/Caregiver/Other: Yes   Patient/Caregiver Demonstrated Understanding: Yes   Plan of Care Discussed and Agreed Upon: Yes   Patient Response to Education: Patient/Caregiver verbalized understanding of information, Patient/Caregiver performed return demonstration of exercises/activities, and Patient/Caregiver asked appropriate questions    Assessment: Patient presents with impaired strength, range of motion/flexibility resulting in limited participation in pain-free ADLs and inability to perform at their prior level of function. Patient demonstrated hypomobile lumbar and thoracic spine. Skilled PT warranted to address the above stated impairments, so the patient can perform FA's without increased pain or difficulty.    PT  Assessment Results: Decreased strength, Decreased range of motion, Pain, Obesity  Rehab Prognosis: Good  Evaluation/Treatment Tolerance: Patient tolerated treatment well    Complexity: low    Plan:  Treatment/Interventions: Education/ Instruction, Hot pack, Cryotherapy, Manual therapy, Neuromuscular re-education, Therapeutic activities, Therapeutic exercises  PT Plan: Skilled PT  PT Frequency: 1 time per week  Duration: 5 weeks  Onset Date: 09/19/24  Certification Period Start Date: 11/06/24  Certification Period End Date: 12/11/24  Number of Treatments Authorized: 1 of 5 (16 of 30 for the year)  Rehab Potential: Good  Plan of Care Agreement: Patient    Goals: Set and discussed today  Active       PT Problem       Patient will achieve spinal flexion to WFL.       Start:  11/06/24    Expected End:  12/11/24            Patient will achieve spinal extension to WFL.       Start:  11/06/24    Expected End:  12/11/24            Patient will achieve bilateral spinal side bending ROM WFL.       Start:  11/06/24    Expected End:  12/11/24            Patient will achieve bilateral hip flexion strength of at least 4+/5       Start:  11/06/24    Expected End:  12/11/24            Patient will achieve bilateral hip abduction strength of at least 4+/5       Start:  11/06/24    Expected End:  12/11/24            Patient will demonstrate independence in home program for support of progression       Start:  11/06/24    Expected End:  12/11/24            Patient will report pain of no more than 2/10 demonstrating a reduction of overall pain       Start:  11/06/24    Expected End:  12/11/24            Patient will show a significant change in NATE (50% to 38%) patient reported outcome tool to demonstrate subjective imporovement       Start:  11/06/24    Expected End:  12/11/24                    Plan of care was developed with input and agreement by the patient    Ambulatory Screenings Summary       Screening  Frequency  Date Last  Completed   Spiritual and Cultural Beliefs   Screening each visit or episode of care 11/6/2024   Falls Risk Screening every ambulatory visit 11/6/2024    Pain Screening annually at primary care visit  11/4/2024   Domestic Violence screening annually at primary care visit 11/6/2024   Depression Screening annually in the primary care setting 11/4/2024   Suicide Risk Screening annually in the primary care setting 10/11/2024   Nutrition and Food Insecurity   Screening at least annually at primary care visit  6/10/2024   Key Learner annually in the primary care setting 11/6/2024   Drug Screen  11/4/2024  1:28 PM   Alcohol Screen  11/4/2024  1:28 PM   Advance Directive  11/4/2024       Mesfin Chowdhury, PT

## 2024-11-08 ENCOUNTER — HOSPITAL ENCOUNTER (OUTPATIENT)
Dept: GASTROENTEROLOGY | Facility: HOSPITAL | Age: 59
Discharge: HOME | End: 2024-11-08
Payer: COMMERCIAL

## 2024-11-08 VITALS
OXYGEN SATURATION: 98 % | SYSTOLIC BLOOD PRESSURE: 114 MMHG | DIASTOLIC BLOOD PRESSURE: 71 MMHG | TEMPERATURE: 97.9 F | RESPIRATION RATE: 17 BRPM | HEART RATE: 60 BPM | BODY MASS INDEX: 39.27 KG/M2 | WEIGHT: 200 LBS | HEIGHT: 60 IN

## 2024-11-08 DIAGNOSIS — M51.24 THORACIC DISC HERNIATION: ICD-10-CM

## 2024-11-08 PROCEDURE — 2500000004 HC RX 250 GENERAL PHARMACY W/ HCPCS (ALT 636 FOR OP/ED): Performed by: ANESTHESIOLOGY

## 2024-11-08 PROCEDURE — 62323 NJX INTERLAMINAR LMBR/SAC: CPT | Performed by: ANESTHESIOLOGY

## 2024-11-08 PROCEDURE — 2550000001 HC RX 255 CONTRASTS: Performed by: ANESTHESIOLOGY

## 2024-11-08 RX ORDER — LIDOCAINE HYDROCHLORIDE 20 MG/ML
INJECTION, SOLUTION EPIDURAL; INFILTRATION; INTRACAUDAL; PERINEURAL AS NEEDED
Status: COMPLETED | OUTPATIENT
Start: 2024-11-08 | End: 2024-11-08

## 2024-11-08 RX ORDER — TRIAMCINOLONE ACETONIDE 40 MG/ML
INJECTION, SUSPENSION INTRA-ARTICULAR; INTRAMUSCULAR AS NEEDED
Status: COMPLETED | OUTPATIENT
Start: 2024-11-08 | End: 2024-11-08

## 2024-11-08 RX ORDER — LIDOCAINE HYDROCHLORIDE 5 MG/ML
INJECTION, SOLUTION INFILTRATION; INTRAVENOUS AS NEEDED
Status: COMPLETED | OUTPATIENT
Start: 2024-11-08 | End: 2024-11-08

## 2024-11-08 ASSESSMENT — PAIN SCALES - GENERAL
PAINLEVEL_OUTOF10: 0 - NO PAIN
PAINLEVEL_OUTOF10: 5 - MODERATE PAIN

## 2024-11-08 ASSESSMENT — PAIN - FUNCTIONAL ASSESSMENT
PAIN_FUNCTIONAL_ASSESSMENT: 0-10
PAIN_FUNCTIONAL_ASSESSMENT: 0-10

## 2024-11-08 ASSESSMENT — COLUMBIA-SUICIDE SEVERITY RATING SCALE - C-SSRS
6. HAVE YOU EVER DONE ANYTHING, STARTED TO DO ANYTHING, OR PREPARED TO DO ANYTHING TO END YOUR LIFE?: NO
1. IN THE PAST MONTH, HAVE YOU WISHED YOU WERE DEAD OR WISHED YOU COULD GO TO SLEEP AND NOT WAKE UP?: NO
2. HAVE YOU ACTUALLY HAD ANY THOUGHTS OF KILLING YOURSELF?: NO

## 2024-11-08 ASSESSMENT — PAIN DESCRIPTION - DESCRIPTORS: DESCRIPTORS: ACHING;SHARP;DULL

## 2024-11-08 ASSESSMENT — ENCOUNTER SYMPTOMS
LOSS OF SENSATION IN FEET: 0
DEPRESSION: 0
OCCASIONAL FEELINGS OF UNSTEADINESS: 0

## 2024-11-08 NOTE — DISCHARGE INSTRUCTIONS
DISCHARGE INSTRUCTIONS FOR INJECTIONS     You underwent thoracic epidural today    Aftermost injections, it is recommended that you relax and limit your activity for the remainder of the day unless you have been told otherwise by your pain physician.  You should not drive a car, operate machinery, or make important legal decisions unless otherwise directed by your pain physician.  You may resume your normal activity, including exercise, tomorrow.      Keep a written pain diary of how much pain relief you experienced following the injection procedure and the length of time of pain relief you experienced pain relief. Following diagnostic injections like medial branch nerve blocks, sacroiliac joint blocks, stellate ganglion injections and other blocks, it is very important you record the specific amount of pain relief you experienced immediately after the injectionand how long it lasted. Your doctor will ask you for this information at your follow up visit.     For all injections, please keep the injection site dry and inspect the site for a couple of days. You may remove the Band-Aid the day of the injection at any time.     Some discomfort, bruising or slight swelling may occur at the injection site. This is not abnormal if it occurs.  If needed you may:    -Take over the counter medication such as Tylenol or Motrin.   -Apply an ice pack for 30 minutes, 2 to 3 times a day for the first 24 hours.     You may shower today; no soaking baths, hot tubs, whirlpools or swimming pools for two days.      If you are given steroids in your injection, it may take 3-5 days for the steroid medication to take effect. You may notice a worsening of your symptoms for 1-2 days after the injection. This is not abnormal.  You may use acetaminophen, ibuprofen, or prescription medication that your doctor may have prescribed for you if you need to do so.     A few common side effects of steroids include facial flushing, sweating,  restlessness, irritability,difficulty sleeping, increase in blood sugar, and increased blood pressure. If you have diabetes, please monitor your blood sugar at least once a day for at least 5 days. If you have poorly controlled high blood pressure, monitoryour blood pressure for at least 2 days and contact your primary care physician if these numbers are unusually high for you.      If you take aspirin or non-steroidal anti-inflammatory drugs (examples are Motrin, Advil, ibuprofen, Naprosyn, Voltaren, Relafen, etc.) you may restart these this evening, but stop taking it 3 days before your next appointment, unless instructed otherwiseby your physician.      You do not need to discontinue non-aspirin-containing pain medications prior to an injection (examples: Celebrex, tramadol, hydrocodone and acetaminophen).      If you take a blood thinning medication (Coumadin, Lovenox, Fragmin,Ticlid, Plavix, Pradaxa, etc.), please discuss this with your primary care physician/cardiologist and your pain physician. These medications MUST be discontinued before you can have an injection safely, without the risk of uncontrolled bleeding. If these medications are not discontinued for an appropriate period of time, you will not be able to receivean injection.      If you are taking Coumadin, please have your INR checked the morning of your procedure and bringthe result to your appointment unless otherwise instructed. If your INR is over 1.2, your injection may need to be rescheduled to avoid uncontrolled bleeding from the needle placement.     Call [facility] at [phone] between 8am-4pm Monday - Friday if you are experiencing the following:    If you received an epidural or spinal injection:    -Headache that doesnot go away with medicine, is worse when sitting or standing up, and is greatly relieved upon lying down.   -Severe pain worse than or different than your baseline pain.   -Chills or fever (101º F or  greater).   -Drainage or signs of infection at the injection site     Go directly to the Emergency Department if you are experiencing the following and received an epidural or spinal injection:   -Abrupt weakness or progressive weakness in your legs that starts after you leave the clinic.   -Abrupt severe or worsening numbness in your legs.   -Inability to urinate after the injection or loss of bowel or bladder control without the urge to defecate or urinate.     If you have a clinical question that cannot wait until your next appointment, please call [phone] between 8am-4pm Monday - Friday or send a [employer] message. We do our best to return all non-emergency messages within 24 hours, Monday - Friday. A nurse or physician will return your message.      If you need to cancel an appointment, please call the scheduling staff at [phone] during normal business hours or leave a message at least 24 hours in advance.     If you are going to be sedated for your next procedure, you MUST have responsible adult who can legally drive accompany you home. You cannot eat or drink for eight hours prior to the planned procedure if you are going to receive sedation. You may take your non-blood thinning medications with a small sip of water.

## 2024-11-08 NOTE — PERIOPERATIVE NURSING NOTE
Received patient from procedure room.   Patient alert and awake.   Band aid x 1 dry and intact to upper back.  Discharge instructions reviewed with patient.  Patient denies pain denies shortness of breath.  Patient ambulates without difficulty, pt denies weakness of lower extremities

## 2024-11-12 ENCOUNTER — TREATMENT (OUTPATIENT)
Dept: PHYSICAL THERAPY | Facility: HOSPITAL | Age: 59
End: 2024-11-12
Payer: COMMERCIAL

## 2024-11-12 DIAGNOSIS — M54.12 CERVICAL RADICULOPATHY: Primary | ICD-10-CM

## 2024-11-12 DIAGNOSIS — M54.6 PAIN IN THORACIC SPINE: ICD-10-CM

## 2024-11-12 PROCEDURE — 97110 THERAPEUTIC EXERCISES: CPT | Mod: GP | Performed by: PHYSICAL THERAPIST

## 2024-11-12 ASSESSMENT — PAIN - FUNCTIONAL ASSESSMENT: PAIN_FUNCTIONAL_ASSESSMENT: 0-10

## 2024-11-12 ASSESSMENT — PAIN SCALES - GENERAL: PAINLEVEL_OUTOF10: 4

## 2024-11-12 ASSESSMENT — PAIN DESCRIPTION - DESCRIPTORS: DESCRIPTORS: ACHING

## 2024-11-12 NOTE — PROGRESS NOTES
"  Physical Therapy Treatment    Patient Name: Sinai Mcbride  MRN: [id number]  Today's Date: 11/12/2024  Time Calculation  Start Time: 1132  Stop Time: 1215  Time Calculation (min): 43 min        PT Therapeutic Procedures Time Entry  Therapeutic Exercise Time Entry: 43                Current Problem  1. Cervical radiculopathy  Follow Up In Physical Therapy      2. Pain in thoracic spine  Follow Up In Physical Therapy        Problem List Items Addressed This Visit             ICD-10-CM    Pain in thoracic spine M54.6    Cervical radiculopathy - Primary M54.12       General  Reason for Referral: neck pain  Referred By: Dr. Acosta  Past Medical History Relevant to Rehab: cervical radiculopathy, thoracic spine pain, chronic pain of right upper extremity, degeneration of lumbar or lumbosacral intervertebral disc, thoracic disc herniation, insomnia, anxiety, PTSD    Subjective   Current Condition:   Same  Patient reports increased pain    Performing HEP?: Partially    Precautions  Precautions  Medical Precautions: Fall precautions  Pain  Pain Assessment: 0-10  0-10 (Numeric) Pain Score: 4  Pain Location: Arm  Pain Orientation: Right  Pain Descriptors: Aching    Objective     Treatments:  Therapeutic Exercise  Therapeutic Exercise Activity 2: SCM stretch 3x15\"  Therapeutic Exercise Activity 3: standing open book x10 R/L  Therapeutic Exercise Activity 5: cervical retraction with extension x5  Therapeutic Exercise Activity 6: B shoulder extension grn x10  Therapeutic Exercise Activity 7: B scapular retraction grn x10  Therapeutic Exercise Activity 9: Sci Fit UBE lvl1 2' fwd/bwd  Therapeutic Exercise Activity 11: seated thoracic extension x10  Therapeutic Exercise Activity 12: Doorway pec stretch x2 30\"  Therapeutic Exercise Activity 13: B horizontal ABD Yellow x8  Therapeutic Exercise Activity 14: wall angels x5         EDUCATION:   Individual(s) Educated: Patient  Education Provided: yes  Handout(s) Provided: Scanned into " chart  Home Program: reviewed home exercise program   Risk and Benefits Discussed with Patient/Caregiver/Other: Yes   Patient/Caregiver Demonstrated Understanding: Yes   Patient Response to Education: Patient/Caregiver verbalized understanding of information, Patient/Caregiver performed return demonstration of exercises/activities, and Patient/Caregiver asked appropriate questions    Assessment: Patient demonstrated increased posterior left upper arm pain with scap retraction and open book exercises. Patient's posterior arm pain went away upon completion of exercises. Patient's cervical range of motion appears to be improving, but she continues to have right sided thoracic pain, left upper extremity pain, and occasional hand/finger pain.   PT Assessment  PT Assessment Results: Decreased strength, Decreased range of motion, Pain, Obesity  Rehab Prognosis: Good  Evaluation/Treatment Tolerance: Patient tolerated treatment well    Plan: Continue with POC. Progress exercises as tolerated.  OP PT Plan  Treatment/Interventions: Education/ Instruction, Hot pack, Cryotherapy, Manual therapy, Neuromuscular re-education, Therapeutic activities, Therapeutic exercises  PT Plan: Skilled PT  PT Frequency: 1 time per week  Duration: 6 weeks  Onset Date: 08/08/24  Certification Period Start Date: 08/30/24  Certification Period End Date: 12/06/24  Number of Treatments Authorized: 10 of 11 (17 of 30 for the year)  Rehab Potential: Good  Plan of Care Agreement: Patient    Goals:  Active       PT Problem       Patient will achieve bilateral cervical side bending ROM at least 30 degrees (Progressing)       Start:  08/30/24    Expected End:  12/06/24            Patient will achieve bilateral cervical rotation ROM at least 70 degrees (Progressing)       Start:  08/30/24    Expected End:  12/06/24            Patient will achieve cervical flexion ROM at least 60 degrees (Not Progressing)       Start:  08/30/24    Expected End:  12/06/24             Patient will achieve cervical extension ROM at least 40 degrees (Met)       Start:  08/30/24    Expected End:  11/07/24    Resolved:  10/25/24         Patient will achieve bilateral shoulder flexion strength of at least 4+/5 (Met)       Start:  08/30/24    Expected End:  10/04/24    Resolved:  10/10/24         Patient will achieve bilateral shoulder abduction strength of at least 4+/5 (Met)       Start:  08/30/24    Expected End:  10/04/24    Resolved:  10/10/24         Patient will demonstrate independence in home program for support of progression (Met)       Start:  08/30/24    Expected End:  10/04/24    Resolved:  10/10/24         Patient will report pain of no more than 2/10 demonstrating a reduction of overall pain (Progressing)       Start:  08/30/24    Expected End:  12/06/24            Patient will show a significant change in NDI (40% to 30%) patient reported outcome tool to demonstrate subjective imporovement (Not Progressing)       Start:  08/30/24    Expected End:  12/06/24            Patient will tolerate yard work without increased radicular symptoms.       Start:  10/25/24    Expected End:  12/06/24                     Mesfin Chowdhury, PT

## 2024-11-13 ENCOUNTER — APPOINTMENT (OUTPATIENT)
Dept: PHYSICAL THERAPY | Facility: HOSPITAL | Age: 59
End: 2024-11-13
Payer: COMMERCIAL

## 2024-11-20 ENCOUNTER — TREATMENT (OUTPATIENT)
Dept: PHYSICAL THERAPY | Facility: HOSPITAL | Age: 59
End: 2024-11-20
Payer: COMMERCIAL

## 2024-11-20 DIAGNOSIS — M54.12 CERVICAL RADICULOPATHY: Primary | ICD-10-CM

## 2024-11-20 DIAGNOSIS — M54.50 CHRONIC MIDLINE LOW BACK PAIN WITHOUT SCIATICA: Primary | ICD-10-CM

## 2024-11-20 DIAGNOSIS — G89.29 CHRONIC MIDLINE LOW BACK PAIN WITHOUT SCIATICA: Primary | ICD-10-CM

## 2024-11-20 DIAGNOSIS — M54.6 PAIN IN THORACIC SPINE: ICD-10-CM

## 2024-11-20 PROCEDURE — 97110 THERAPEUTIC EXERCISES: CPT | Mod: GP | Performed by: PHYSICAL THERAPIST

## 2024-11-20 ASSESSMENT — PAIN SCALES - GENERAL
PAINLEVEL_OUTOF10: 4
PAINLEVEL_OUTOF10: 2

## 2024-11-20 ASSESSMENT — PAIN - FUNCTIONAL ASSESSMENT
PAIN_FUNCTIONAL_ASSESSMENT: 0-10
PAIN_FUNCTIONAL_ASSESSMENT: 0-10

## 2024-11-20 NOTE — PROGRESS NOTES
"  Physical Therapy Treatment    Patient Name: Sinai Mcbride  MRN: [id number]  Today's Date: 11/20/2024  Time Calculation  Start Time: 1044  Stop Time: 1127  Time Calculation (min): 43 min        PT Therapeutic Procedures Time Entry  Therapeutic Exercise Time Entry: 43        Current Problem  1. Chronic midline low back pain without sciatica  Follow Up In Physical Therapy        Problem List Items Addressed This Visit             ICD-10-CM    Chronic midline low back pain without sciatica - Primary M54.50, G89.29       General  Reason for Referral: LBP  Referred By: Dr. Acosta  Past Medical History Relevant to Rehab: cervical radiculopathy, thoracic spine pain, chronic pain of right upper extremity, degeneration of lumbar or lumbosacral intervertebral disc, thoracic disc herniation, insomnia, anxiety, PTSD    Subjective   Current Condition:   Better  Patient reports that her right lower extremity pain is limited to her right posterior/lateral hip. Patient states that she has been walking more and is starting to feel better.    Performing HEP?: Yes    Precautions  Precautions  Medical Precautions: Fall precautions  Pain  Pain Assessment: 0-10  0-10 (Numeric) Pain Score: 2  Pain Location: Hip  Pain Orientation: Right, Posterior    Objective     Treatments:  Therapeutic Exercise  Therapeutic Exercise Performed: Yes  Therapeutic Exercise Activity 1: LTR x10  Therapeutic Exercise Activity 4: DKTC on SB x10  Therapeutic Exercise Activity 7: prone on elbows x30\"  Therapeutic Exercise Activity 8: prone press ups x3  Therapeutic Exercise Activity 9: Nu-Step lvl2 x5' seat #4  Therapeutic Exercise Activity 15: step ups fwd 4\" x10  Therapeutic Exercise Activity 16: standing hip abd x10  Therapeutic Exercise Activity 17: standing hip ext x10  Therapeutic Exercise Activity 18: golfswing/hip toss 2# MB x10  Therapeutic Exercise Activity 19: figure 4 piriformis stretch 3x20\"         EDUCATION:   Individual(s) Educated: " Patient  Education Provided: yes  Handout(s) Provided: Scanned into chart  Home Program: reviewed home exercise program   Risk and Benefits Discussed with Patient/Caregiver/Other: Yes   Patient/Caregiver Demonstrated Understanding: Yes   Patient Response to Education: Patient/Caregiver verbalized understanding of information, Patient/Caregiver performed return demonstration of exercises/activities, and Patient/Caregiver asked appropriate questions    Assessment: Patient demonstrated good tolerance to exercises with improvement in low back pain post exercises. Patient demonstrated difficulty performing step ups and standing hip exercises due to weakness.   PT Assessment  PT Assessment Results: Decreased strength, Decreased range of motion, Pain, Obesity  Rehab Prognosis: Good  Evaluation/Treatment Tolerance: Patient tolerated treatment well    Plan: Continue with POC. Progress exercises as tolerated.  OP PT Plan  Treatment/Interventions: Education/ Instruction, Hot pack, Cryotherapy, Manual therapy, Neuromuscular re-education, Therapeutic activities, Therapeutic exercises  PT Plan: Skilled PT  PT Frequency: 1 time per week  Duration: 5 weeks  Onset Date: 09/19/24  Certification Period Start Date: 11/06/24  Certification Period End Date: 12/11/24  Number of Treatments Authorized: 2 of 5 (19 of 30)  Rehab Potential: Good  Plan of Care Agreement: Patient    Goals:  Active       PT Problem       Patient will achieve spinal flexion to WFL.       Start:  11/06/24    Expected End:  12/11/24            Patient will achieve spinal extension to WFL.       Start:  11/06/24    Expected End:  12/11/24            Patient will achieve bilateral spinal side bending ROM WFL.       Start:  11/06/24    Expected End:  12/11/24            Patient will achieve bilateral hip flexion strength of at least 4+/5       Start:  11/06/24    Expected End:  12/11/24            Patient will achieve bilateral hip abduction strength of at least 4+/5        Start:  11/06/24    Expected End:  12/11/24            Patient will demonstrate independence in home program for support of progression       Start:  11/06/24    Expected End:  12/11/24            Patient will report pain of no more than 2/10 demonstrating a reduction of overall pain       Start:  11/06/24    Expected End:  12/11/24            Patient will show a significant change in NATE (50% to 38%) patient reported outcome tool to demonstrate subjective imporovement       Start:  11/06/24    Expected End:  12/11/24                 Mesfin Chowdhury, PT  '

## 2024-11-20 NOTE — PROGRESS NOTES
"  Physical Therapy Treatment    Patient Name: Sinai Mcbride  MRN: [id number]  Today's Date: 11/20/2024  Time Calculation  Start Time: 1001  Stop Time: 1042  Time Calculation (min): 41 min        PT Therapeutic Procedures Time Entry  Therapeutic Exercise Time Entry: 41                Current Problem  1. Cervical radiculopathy  Follow Up In Physical Therapy      2. Pain in thoracic spine  Follow Up In Physical Therapy        Problem List Items Addressed This Visit             ICD-10-CM    Pain in thoracic spine M54.6    Cervical radiculopathy - Primary M54.12       General  Reason for Referral: neck pain  Referred By: Dr. Acosta  Past Medical History Relevant to Rehab: cervical radiculopathy, thoracic spine pain, chronic pain of right upper extremity, degeneration of lumbar or lumbosacral intervertebral disc, thoracic disc herniation, insomnia, anxiety, PTSD    Subjective   Current Condition:   Better  Patient reports decreased radicular symptoms in her arms. Patient continues to have chest pain. Patient     Performing HEP?: Yes    Precautions  Precautions  Medical Precautions: Fall precautions  Pain  Pain Assessment: 0-10  0-10 (Numeric) Pain Score: 4  Pain Location: Chest    Objective     Treatments:  Therapeutic Exercise  Therapeutic Exercise Performed: Yes  Therapeutic Exercise Activity 2: SCM stretch 3x15\"  Therapeutic Exercise Activity 3: standing open book x10 R/L  Therapeutic Exercise Activity 5: cervical retraction with extension x10  Therapeutic Exercise Activity 9: Sci Fit UBE lvl1 2' fwd/bwd  Therapeutic Exercise Activity 11: seated thoracic extension x10  Therapeutic Exercise Activity 12: Doorway pec stretch x2 30\"  Therapeutic Exercise Activity 14: wall angels x10         EDUCATION:   Individual(s) Educated: Patient  Education Provided: yes  Handout(s) Provided: Scanned into chart  Home Program: reviewed home exercise program   Risk and Benefits Discussed with Patient/Caregiver/Other: Yes "   Patient/Caregiver Demonstrated Understanding: Yes   Patient Response to Education: Patient/Caregiver verbalized understanding of information, Patient/Caregiver performed return demonstration of exercises/activities, and Patient/Caregiver asked appropriate questions    Assessment: Patient was limited in performing exercises involving her right hand due to to complaints of right wrist/thumb pain today. Patient verbalized decreased chest pain post therapy today.   PT Assessment  PT Assessment Results: Decreased strength, Decreased range of motion, Pain, Obesity  Rehab Prognosis: Good  Evaluation/Treatment Tolerance: Patient tolerated treatment well    Plan: Continue with POC. Recheck next visit.  OP PT Plan  Treatment/Interventions: Education/ Instruction, Hot pack, Cryotherapy, Manual therapy, Neuromuscular re-education, Therapeutic activities, Therapeutic exercises  PT Plan: Skilled PT  PT Frequency: 1 time per week  Duration: 6 weeks  Onset Date: 08/08/24  Certification Period Start Date: 08/30/24  Certification Period End Date: 12/06/24  Number of Treatments Authorized: 11 of 15 (18 of 30)  Rehab Potential: Good  Plan of Care Agreement: Patient    Goals:  Active       PT Problem       Patient will achieve bilateral cervical side bending ROM at least 30 degrees (Progressing)       Start:  08/30/24    Expected End:  12/06/24            Patient will achieve bilateral cervical rotation ROM at least 70 degrees (Progressing)       Start:  08/30/24    Expected End:  12/06/24            Patient will achieve cervical flexion ROM at least 60 degrees (Not Progressing)       Start:  08/30/24    Expected End:  12/06/24            Patient will achieve cervical extension ROM at least 40 degrees (Met)       Start:  08/30/24    Expected End:  11/07/24    Resolved:  10/25/24         Patient will achieve bilateral shoulder flexion strength of at least 4+/5 (Met)       Start:  08/30/24    Expected End:  10/04/24    Resolved:   10/10/24         Patient will achieve bilateral shoulder abduction strength of at least 4+/5 (Met)       Start:  08/30/24    Expected End:  10/04/24    Resolved:  10/10/24         Patient will demonstrate independence in home program for support of progression (Met)       Start:  08/30/24    Expected End:  10/04/24    Resolved:  10/10/24         Patient will report pain of no more than 2/10 demonstrating a reduction of overall pain (Progressing)       Start:  08/30/24    Expected End:  12/06/24            Patient will show a significant change in NDI (40% to 30%) patient reported outcome tool to demonstrate subjective imporovement (Not Progressing)       Start:  08/30/24    Expected End:  12/06/24            Patient will tolerate yard work without increased radicular symptoms.       Start:  10/25/24    Expected End:  12/06/24                     Mesfin Chowdhury, PT

## 2024-11-21 ENCOUNTER — OFFICE VISIT (OUTPATIENT)
Dept: PAIN MEDICINE | Facility: CLINIC | Age: 59
End: 2024-11-21
Payer: COMMERCIAL

## 2024-11-21 VITALS
HEART RATE: 55 BPM | BODY MASS INDEX: 39.27 KG/M2 | DIASTOLIC BLOOD PRESSURE: 85 MMHG | RESPIRATION RATE: 18 BRPM | WEIGHT: 200 LBS | SYSTOLIC BLOOD PRESSURE: 165 MMHG | OXYGEN SATURATION: 95 % | HEIGHT: 60 IN

## 2024-11-21 DIAGNOSIS — M51.24 THORACIC DISC HERNIATION: Primary | ICD-10-CM

## 2024-11-21 PROCEDURE — 3079F DIAST BP 80-89 MM HG: CPT | Performed by: PHYSICIAN ASSISTANT

## 2024-11-21 PROCEDURE — 99213 OFFICE O/P EST LOW 20 MIN: CPT | Performed by: PHYSICIAN ASSISTANT

## 2024-11-21 PROCEDURE — 3008F BODY MASS INDEX DOCD: CPT | Performed by: PHYSICIAN ASSISTANT

## 2024-11-21 PROCEDURE — 3077F SYST BP >= 140 MM HG: CPT | Performed by: PHYSICIAN ASSISTANT

## 2024-11-21 PROCEDURE — 1036F TOBACCO NON-USER: CPT | Performed by: PHYSICIAN ASSISTANT

## 2024-11-21 ASSESSMENT — ENCOUNTER SYMPTOMS
RESPIRATORY NEGATIVE: 1
HEMATOLOGIC/LYMPHATIC NEGATIVE: 1
WEAKNESS: 1
BACK PAIN: 1
ALLERGIC/IMMUNOLOGIC NEGATIVE: 1
EYES NEGATIVE: 1
PAIN: 1
CONSTITUTIONAL NEGATIVE: 1
PSYCHIATRIC NEGATIVE: 1
CARDIOVASCULAR NEGATIVE: 1
MYALGIAS: 1
ENDOCRINE NEGATIVE: 1
NECK PAIN: 1
GASTROINTESTINAL NEGATIVE: 1

## 2024-11-21 ASSESSMENT — PATIENT HEALTH QUESTIONNAIRE - PHQ9
2. FEELING DOWN, DEPRESSED OR HOPELESS: NOT AT ALL
SUM OF ALL RESPONSES TO PHQ9 QUESTIONS 1 & 2: 0
1. LITTLE INTEREST OR PLEASURE IN DOING THINGS: NOT AT ALL

## 2024-11-21 ASSESSMENT — LIFESTYLE VARIABLES
HOW OFTEN DO YOU HAVE A DRINK CONTAINING ALCOHOL: NEVER
HOW OFTEN DO YOU HAVE SIX OR MORE DRINKS ON ONE OCCASION: NEVER
AUDIT-C TOTAL SCORE: 0
SKIP TO QUESTIONS 9-10: 1
HOW MANY STANDARD DRINKS CONTAINING ALCOHOL DO YOU HAVE ON A TYPICAL DAY: PATIENT DOES NOT DRINK

## 2024-11-21 ASSESSMENT — PAIN SCALES - GENERAL
PAINLEVEL_OUTOF10: 4
PAINLEVEL_OUTOF10: 4

## 2024-11-21 NOTE — PROGRESS NOTES
Subjective   Patient ID: Sinai Mcbride is a 59 y.o. female who presents for Pain.  Patient is a 59-year-old female with a history of cervical spinal stenosis t Asik radiculopathy the presents today for follow-up after injection.  Patient denotes after her T-spine injection she had near the 1 day of a most good relief.  The pain returned.  Patient continues to be concerned that it may be a vital organ.  Patient has tried physical therapy in the past with no benefit to her symptoms.    Pain  Associated symptoms include weakness.       Review of Systems   Constitutional: Negative.    HENT: Negative.     Eyes: Negative.    Respiratory: Negative.     Cardiovascular: Negative.    Gastrointestinal: Negative.    Endocrine: Negative.    Genitourinary: Negative.    Musculoskeletal:  Positive for back pain, myalgias and neck pain.   Skin: Negative.    Allergic/Immunologic: Negative.    Neurological:  Positive for weakness.   Hematological: Negative.    Psychiatric/Behavioral: Negative.         Objective   Physical Exam  Vitals reviewed.   Constitutional:       Appearance: Normal appearance.   HENT:      Head: Normocephalic and atraumatic.      Mouth/Throat:      Mouth: Mucous membranes are moist.   Neck:      Vascular: No JVD.   Pulmonary:      Effort: Pulmonary effort is normal. No tachypnea or bradypnea.   Abdominal:      Palpations: Abdomen is soft.   Musculoskeletal:      Cervical back: Decreased range of motion.      Thoracic back: Decreased range of motion.   Skin:     General: Skin is warm and dry.   Neurological:      Mental Status: She is alert and oriented to person, place, and time.   Psychiatric:         Mood and Affect: Mood normal.         Behavior: Behavior normal. Behavior is cooperative.         Assessment/Plan   Problem List Items Addressed This Visit             ICD-10-CM    Thoracic disc herniation - Primary M51.24     I reviewed the case with my supervising physician he is of the point that unfortunately  treatments are not providing durable relief.  She can try chiropractic or additional physical therapy.  I do not feel and nor does he a surgical consult will want any therapeutic benefit.  I tried to explain the pathophysiology the diagnostic relief from the numbing medication in the injection.  I did provide patient with a chiropractor/acupuncturist Dr. Anival Myrick if patient would like a referral we can more than happy to send that.  Patient will follow-up on a as needed basis       Juan Rider PA-C 11/21/24 3:32 PM

## 2024-11-26 ENCOUNTER — APPOINTMENT (OUTPATIENT)
Dept: PHYSICAL THERAPY | Facility: HOSPITAL | Age: 59
End: 2024-11-26
Payer: COMMERCIAL

## 2024-12-02 DIAGNOSIS — M51.379 DEGENERATION OF LUMBAR OR LUMBOSACRAL INTERVERTEBRAL DISC: ICD-10-CM

## 2024-12-02 RX ORDER — METHOCARBAMOL 750 MG/1
750 TABLET, FILM COATED ORAL 4 TIMES DAILY
Qty: 120 TABLET | Refills: 2 | Status: SHIPPED | OUTPATIENT
Start: 2024-12-02 | End: 2025-01-01

## 2024-12-03 ENCOUNTER — APPOINTMENT (OUTPATIENT)
Dept: PHYSICAL THERAPY | Facility: HOSPITAL | Age: 59
End: 2024-12-03
Payer: COMMERCIAL

## 2025-01-10 ENCOUNTER — OFFICE VISIT (OUTPATIENT)
Dept: PRIMARY CARE | Facility: CLINIC | Age: 60
End: 2025-01-10
Payer: COMMERCIAL

## 2025-01-10 VITALS
DIASTOLIC BLOOD PRESSURE: 90 MMHG | HEIGHT: 60 IN | OXYGEN SATURATION: 95 % | TEMPERATURE: 98.2 F | BODY MASS INDEX: 43.19 KG/M2 | HEART RATE: 84 BPM | SYSTOLIC BLOOD PRESSURE: 150 MMHG | WEIGHT: 220 LBS

## 2025-01-10 DIAGNOSIS — M54.6 CHRONIC RIGHT-SIDED THORACIC BACK PAIN: Primary | ICD-10-CM

## 2025-01-10 DIAGNOSIS — G89.29 CHRONIC RIGHT-SIDED THORACIC BACK PAIN: Primary | ICD-10-CM

## 2025-01-10 DIAGNOSIS — R06.09 EXERTIONAL DYSPNEA: ICD-10-CM

## 2025-01-10 DIAGNOSIS — M54.9 DORSALGIA: ICD-10-CM

## 2025-01-10 DIAGNOSIS — R16.0 HEPATOMEGALY: ICD-10-CM

## 2025-01-10 DIAGNOSIS — R52 PAIN: ICD-10-CM

## 2025-01-10 DIAGNOSIS — M79.645 PAIN OF LEFT THUMB: ICD-10-CM

## 2025-01-10 DIAGNOSIS — J40 BRONCHITIS: ICD-10-CM

## 2025-01-10 DIAGNOSIS — R06.02 SHORTNESS OF BREATH: ICD-10-CM

## 2025-01-10 PROCEDURE — 99214 OFFICE O/P EST MOD 30 MIN: CPT | Performed by: FAMILY MEDICINE

## 2025-01-10 PROCEDURE — 3080F DIAST BP >= 90 MM HG: CPT | Performed by: FAMILY MEDICINE

## 2025-01-10 PROCEDURE — 3077F SYST BP >= 140 MM HG: CPT | Performed by: FAMILY MEDICINE

## 2025-01-10 PROCEDURE — 3008F BODY MASS INDEX DOCD: CPT | Performed by: FAMILY MEDICINE

## 2025-01-10 RX ORDER — IBUPROFEN 800 MG/1
800 TABLET ORAL EVERY 8 HOURS PRN
COMMUNITY
Start: 2024-09-04

## 2025-01-10 RX ORDER — ALBUTEROL SULFATE 90 UG/1
2 INHALANT RESPIRATORY (INHALATION) EVERY 4 HOURS PRN
Qty: 18 G | Refills: 5 | Status: SHIPPED | OUTPATIENT
Start: 2025-01-10

## 2025-01-10 RX ORDER — LIDOCAINE 50 MG/G
1 PATCH TOPICAL DAILY
Qty: 30 PATCH | Refills: 1 | Status: SHIPPED | OUTPATIENT
Start: 2025-01-10 | End: 2026-01-10

## 2025-01-10 RX ORDER — DOXYCYCLINE 100 MG/1
100 CAPSULE ORAL 2 TIMES DAILY
Qty: 20 CAPSULE | Refills: 0 | Status: SHIPPED | OUTPATIENT
Start: 2025-01-10 | End: 2025-01-20

## 2025-01-10 RX ORDER — MECLIZINE HCL 25MG 25 MG/1
1 TABLET, CHEWABLE ORAL EVERY 12 HOURS PRN
COMMUNITY

## 2025-01-10 RX ORDER — ALBUTEROL SULFATE 0.83 MG/ML
2.5 SOLUTION RESPIRATORY (INHALATION) EVERY 4 HOURS PRN
Qty: 75 ML | Refills: 2 | Status: SHIPPED | OUTPATIENT
Start: 2025-01-10

## 2025-01-10 ASSESSMENT — ENCOUNTER SYMPTOMS
LOSS OF SENSATION IN FEET: 0
OCCASIONAL FEELINGS OF UNSTEADINESS: 0
DEPRESSION: 0

## 2025-01-10 ASSESSMENT — PAIN SCALES - GENERAL: PAINLEVEL_OUTOF10: 6

## 2025-01-10 NOTE — LETTER
January 10, 2025     Patient: Sinai Mcbride   YOB: 1965   Date of Visit: 1/10/2025       To Whom It May Concern:    Sinai Mcbride was seen in my clinic on 1/10/2025 at 8:45 am. Please excuse Sinai for her absence from work on this day to make the appointment.  Please also note that I recommend that she be able to work while sitting on a stool due to chronic medical issues.  Thanks for your understanding.    If you have any questions or concerns, please don't hesitate to call.         Sincerely,         Clement Zarate DO        CC: No Recipients

## 2025-01-10 NOTE — PROGRESS NOTES
Subjective   Patient ID: Sinai Mcbride is a 59 y.o. female who presents for side pain (X several years/Requesting letter to be able to sit at work as  to prevent passing out).    HPI     Review of Systems    Objective   /90   Pulse 84   Temp 36.8 °C (98.2 °F)   Ht 1.524 m (5')   Wt 99.8 kg (220 lb)   SpO2 95%   BMI 42.97 kg/m²     Physical Exam    Assessment/Plan   Diagnoses and all orders for this visit:  Chronic right-sided thoracic back pain  Shortness of breath  Bronchitis          for this visit:  Chronic right-sided thoracic back pain  -     Follow Up In Primary Care - Established; Future  -     lidocaine (Lidoderm) 5 % patch; Place 1 patch over 12 hours on the skin once daily. Apply to painful area 12 hours per day, remove for 12 hours.  Shortness of breath  -     albuterol 90 mcg/actuation inhaler; Inhale 2 puffs every 4 hours if needed for shortness of breath or wheezing.  -     Complete Pulmonary Function Test (Spirometry/DLCO/Lung Volumes); Future  -     albuterol 2.5 mg /3 mL (0.083 %) nebulizer solution; Take 3 mL (2.5 mg) by nebulization every 4 hours if needed for wheezing or shortness of breath.  Bronchitis  -     doxycycline (Vibramycin) 100 mg capsule; Take 1 capsule (100 mg) by mouth 2 times a day for 10 days. Take with at least 8 ounces (large glass) of water, do not lie down for 30 minutes after  -     albuterol 2.5 mg /3 mL (0.083 %) nebulizer solution; Take 3 mL (2.5 mg) by nebulization every 4 hours if needed for wheezing or shortness of breath.  Pain  Hepatomegaly  Exertional dyspnea  -     Complete Pulmonary Function Test (Spirometry/DLCO/Lung Volumes); Future  Pain of left thumb  -     Referral to Orthopaedic Surgery; Future  Dorsalgia  -     Follow Up In Primary Care - Established; Future  -     lidocaine (Lidoderm) 5 % patch; Place 1 patch over 12 hours on the skin once daily. Apply to painful area 12 hours per day, remove for 12 hours.

## 2025-01-14 DIAGNOSIS — R16.0 HEPATOMEGALY: Primary | ICD-10-CM

## 2025-01-14 DIAGNOSIS — I10 PRIMARY HYPERTENSION: ICD-10-CM

## 2025-01-14 DIAGNOSIS — G89.29 CHRONIC RIGHT-SIDED THORACIC BACK PAIN: ICD-10-CM

## 2025-01-14 DIAGNOSIS — M54.6 CHRONIC RIGHT-SIDED THORACIC BACK PAIN: ICD-10-CM

## 2025-01-14 DIAGNOSIS — R10.84 GENERALIZED ABDOMINAL PAIN: ICD-10-CM

## 2025-01-15 ENCOUNTER — APPOINTMENT (OUTPATIENT)
Dept: RADIOLOGY | Facility: HOSPITAL | Age: 60
End: 2025-01-15
Payer: COMMERCIAL

## 2025-01-15 ENCOUNTER — HOSPITAL ENCOUNTER (OUTPATIENT)
Dept: RADIOLOGY | Facility: HOSPITAL | Age: 60
Discharge: HOME | End: 2025-01-15
Payer: COMMERCIAL

## 2025-01-15 DIAGNOSIS — R16.0 HEPATOMEGALY: ICD-10-CM

## 2025-01-15 DIAGNOSIS — I10 PRIMARY HYPERTENSION: ICD-10-CM

## 2025-01-15 DIAGNOSIS — R10.84 GENERALIZED ABDOMINAL PAIN: ICD-10-CM

## 2025-01-15 PROCEDURE — 76700 US EXAM ABDOM COMPLETE: CPT | Performed by: RADIOLOGY

## 2025-01-15 PROCEDURE — 76700 US EXAM ABDOM COMPLETE: CPT

## 2025-01-21 ENCOUNTER — HOSPITAL ENCOUNTER (EMERGENCY)
Facility: HOSPITAL | Age: 60
Discharge: HOME | End: 2025-01-21
Attending: EMERGENCY MEDICINE
Payer: COMMERCIAL

## 2025-01-21 VITALS
HEART RATE: 56 BPM | WEIGHT: 215 LBS | SYSTOLIC BLOOD PRESSURE: 129 MMHG | TEMPERATURE: 97.3 F | OXYGEN SATURATION: 93 % | BODY MASS INDEX: 42.21 KG/M2 | RESPIRATION RATE: 18 BRPM | DIASTOLIC BLOOD PRESSURE: 81 MMHG | HEIGHT: 60 IN

## 2025-01-21 DIAGNOSIS — M54.41 CHRONIC RIGHT-SIDED LOW BACK PAIN WITH RIGHT-SIDED SCIATICA: Primary | ICD-10-CM

## 2025-01-21 DIAGNOSIS — G89.29 CHRONIC RIGHT-SIDED LOW BACK PAIN WITH RIGHT-SIDED SCIATICA: Primary | ICD-10-CM

## 2025-01-21 PROCEDURE — 99283 EMERGENCY DEPT VISIT LOW MDM: CPT | Performed by: EMERGENCY MEDICINE

## 2025-01-21 PROCEDURE — 2500000001 HC RX 250 WO HCPCS SELF ADMINISTERED DRUGS (ALT 637 FOR MEDICARE OP): Mod: SE | Performed by: EMERGENCY MEDICINE

## 2025-01-21 PROCEDURE — 2500000004 HC RX 250 GENERAL PHARMACY W/ HCPCS (ALT 636 FOR OP/ED): Mod: SE

## 2025-01-21 PROCEDURE — 2500000005 HC RX 250 GENERAL PHARMACY W/O HCPCS: Mod: SE | Performed by: EMERGENCY MEDICINE

## 2025-01-21 RX ORDER — CYCLOBENZAPRINE HCL 5 MG
5 TABLET ORAL ONCE
Status: COMPLETED | OUTPATIENT
Start: 2025-01-21 | End: 2025-01-21

## 2025-01-21 RX ORDER — LIDOCAINE 560 MG/1
PATCH PERCUTANEOUS; TOPICAL; TRANSDERMAL
Status: DISCONTINUED
Start: 2025-01-21 | End: 2025-01-21 | Stop reason: HOSPADM

## 2025-01-21 RX ORDER — DEXAMETHASONE 6 MG/1
TABLET ORAL
Status: COMPLETED
Start: 2025-01-21 | End: 2025-01-21

## 2025-01-21 RX ORDER — CYCLOBENZAPRINE HCL 5 MG
5 TABLET ORAL 3 TIMES DAILY PRN
Qty: 20 TABLET | Refills: 0 | Status: SHIPPED | OUTPATIENT
Start: 2025-01-21 | End: 2025-01-31

## 2025-01-21 RX ORDER — KETOROLAC TROMETHAMINE 30 MG/ML
30 INJECTION, SOLUTION INTRAMUSCULAR; INTRAVENOUS ONCE
Status: DISCONTINUED | OUTPATIENT
Start: 2025-01-21 | End: 2025-01-21 | Stop reason: HOSPADM

## 2025-01-21 RX ORDER — CYCLOBENZAPRINE HCL 5 MG
TABLET ORAL
Status: DISCONTINUED
Start: 2025-01-21 | End: 2025-01-21 | Stop reason: HOSPADM

## 2025-01-21 RX ORDER — KETOROLAC TROMETHAMINE 30 MG/ML
INJECTION, SOLUTION INTRAMUSCULAR; INTRAVENOUS
Status: DISCONTINUED
Start: 2025-01-21 | End: 2025-01-21 | Stop reason: HOSPADM

## 2025-01-21 RX ORDER — LIDOCAINE 560 MG/1
1 PATCH PERCUTANEOUS; TOPICAL; TRANSDERMAL DAILY
Status: DISCONTINUED | OUTPATIENT
Start: 2025-01-21 | End: 2025-01-21 | Stop reason: HOSPADM

## 2025-01-21 RX ORDER — DEXAMETHASONE 6 MG/1
6 TABLET ORAL ONCE
Status: COMPLETED | OUTPATIENT
Start: 2025-01-21 | End: 2025-01-21

## 2025-01-21 RX ADMIN — CYCLOBENZAPRINE HYDROCHLORIDE 5 MG: 5 TABLET, FILM COATED ORAL at 10:07

## 2025-01-21 RX ADMIN — DEXAMETHASONE 6 MG: 6 TABLET ORAL at 10:08

## 2025-01-21 RX ADMIN — LIDOCAINE 1 PATCH: 4 PATCH TOPICAL at 10:09

## 2025-01-21 ASSESSMENT — PAIN DESCRIPTION - DESCRIPTORS
DESCRIPTORS: ACHING
DESCRIPTORS: ACHING;SHARP;RADIATING
DESCRIPTORS: ACHING;SHARP

## 2025-01-21 ASSESSMENT — PAIN DESCRIPTION - PAIN TYPE
TYPE: CHRONIC PAIN
TYPE: CHRONIC PAIN

## 2025-01-21 ASSESSMENT — PAIN DESCRIPTION - LOCATION
LOCATION: BACK

## 2025-01-21 ASSESSMENT — PAIN SCALES - GENERAL
PAINLEVEL_OUTOF10: 7

## 2025-01-21 ASSESSMENT — PAIN DESCRIPTION - FREQUENCY
FREQUENCY: CONSTANT/CONTINUOUS

## 2025-01-21 ASSESSMENT — PAIN DESCRIPTION - ORIENTATION
ORIENTATION: RIGHT
ORIENTATION: RIGHT

## 2025-01-21 ASSESSMENT — PAIN - FUNCTIONAL ASSESSMENT: PAIN_FUNCTIONAL_ASSESSMENT: 0-10

## 2025-01-21 NOTE — ED PROVIDER NOTES
HPI   Chief Complaint   Patient presents with    Back Pain     right       HPI  Patient is a 59-year-old female presenting to the ED today for exacerbation of chronic right low back pain with associated right-sided sciatica.  Patient explains that she has chronic right sided low back pain with associated sciatica into her right leg at baseline.  She notes that she has been on Robaxin for her pain for many years now.  It was initially controlling her pain well, but for the past several months she feels like it is no longer helping.  She did follow-up with pain management, but does not feel like they helped in any way.  She explains that over the weekend, she worked 2 days in a row which often exacerbates her pain.  Since yesterday, she has had worsening pain to her low back radiating to her right leg.  She tried taking her Robaxin yesterday with no relief.  She has not taken anything today for her pain.  Given her exacerbation, she came to the ED for further pain management. Patient denies recent fever, history of IV drug abuse, history of malignancy, direct trauma to the back, saddle paresthesia, bowel or bladder incontinence, previous infection of the brain, spinal cord or vertebral column, use of anticoagulation.      Patient History   Past Medical History:   Diagnosis Date    Anxiety     Asthma     Migraine     Personal history of other diseases of the circulatory system 07/11/2022    History of hypertension     Past Surgical History:   Procedure Laterality Date    OTHER SURGICAL HISTORY  07/11/2022    Cholecystectomy    OTHER SURGICAL HISTORY  07/11/2022    Tonsillectomy     Family History   Problem Relation Name Age of Onset    Other (degenerative joint/disk disease) Mother      Hypertension Mother      Other (scolio) Mother      Leukemia Father      Hypertension Father      Cancer Father      Skin cancer Brother      Alcohol abuse Brother      No Known Problems Daughter      No Known Problems Daughter      No  Known Problems Son      Colon cancer Maternal Grandmother      Lung cancer Maternal Grandfather      Heart disease Paternal Grandmother      Stroke Paternal Grandmother      Heart disease Paternal Grandfather      Hypertension Sibling      Cancer Sibling       Social History     Tobacco Use    Smoking status: Former     Current packs/day: 0.00     Average packs/day: 0.5 packs/day for 15.0 years (7.5 ttl pk-yrs)     Types: Cigarettes     Start date: 11/17/2006     Quit date: 11/17/2021     Years since quitting: 3.1    Smokeless tobacco: Never   Vaping Use    Vaping status: Never Used   Substance Use Topics    Alcohol use: Yes     Comment: social    Drug use: Yes     Types: Marijuana       Physical Exam   ED Triage Vitals [01/21/25 0914]   Temperature Heart Rate Respirations BP   36.3 °C (97.3 °F) 54 18 130/79      Pulse Ox Temp Source Heart Rate Source Patient Position   (!) 93 % Temporal Monitor Sitting      BP Location FiO2 (%)     Left arm --       Physical Exam  Vitals and nursing note reviewed.   Constitutional:       General: She is not in acute distress.     Appearance: She is not toxic-appearing.   HENT:      Head: Normocephalic.      Mouth/Throat:      Mouth: Mucous membranes are moist.   Eyes:      Extraocular Movements: Extraocular movements intact.      Conjunctiva/sclera: Conjunctivae normal.   Cardiovascular:      Rate and Rhythm: Normal rate and regular rhythm.      Pulses: Normal pulses.   Pulmonary:      Effort: Pulmonary effort is normal. No respiratory distress.      Breath sounds: Normal breath sounds. No wheezing.   Abdominal:      General: There is no distension.      Palpations: Abdomen is soft.   Musculoskeletal:         General: No swelling.      Cervical back: Neck supple.   Skin:     General: Skin is warm and dry.      Capillary Refill: Capillary refill takes less than 2 seconds.   Neurological:      General: No focal deficit present.      Mental Status: She is alert. Mental status is at  baseline.      Comments: No saddle paresthesia.  Lower extremities demonstrate symmetrical 5/5 strength for flexion and extension at the hip, abduction at the hip, flexion and extension at the knee, dorsiflexion and plantarflexion at the ankle.  Full ROM with hip, knee and ankle.   Bones nontender to palpation.  Normal sensation to light touch over all dermatomes of the thigh, calf and foot bilaterally.  No contusions, abrasions, lacerations, pallor or cyanosis noted on the skin.  +2 DP and PT pulses bilaterally.   Cap refill <2 seconds bilaterally.  Ambulatory with the assistance of a cane.           ED Course & MDM   Diagnoses as of 01/21/25 1850   Chronic right-sided low back pain with right-sided sciatica             No data recorded                               Medical Decision Making  Patient was seen and evaluated for back pain.  Differential diagnosis includes but is not limited to musculoskeletal pain, kidney stone, pyelonephritis, cauda equina, spinal epidural abscess, vertebral fracture.  However, as patient's history and exam are consistent with musculoskeletal pain and associated sciatica, and patient does not have any red flag signs/symptoms for back pain, additional labs and imaging are not indicated at this time.  Patient is treated symptomatically with Flexeril 5 mg PO, Decadron 6 mg PO, 4% lidocaine transdermal patch.  On reevaluation, patient is resting comfortably.  She states that her pain feels much better at this time, and she would like to go home.  All questions and concerns were answered. Discharge planning with close outpatient follow-up was discussed at this time, to which the patient was agreeable. Strict return precautions were given, and patient was discharged home in stable condition with a prescription for flexeril as needed for pain.    Tests/Medications/Escalations of Care considered but not given:  Considered additional tests such as CBC, CMP, ESR, CRP, CT/MRI imaging of the  back.  However, as patient has no red flag signs/symptoms of back pain, and history/exam are consistent with musculoskeletal pain, additional tests are not indicated at this time.    Procedure  Procedures     Lexie SARGENT MD  01/21/25 1702

## 2025-01-24 ENCOUNTER — TELEPHONE (OUTPATIENT)
Dept: PRIMARY CARE | Facility: CLINIC | Age: 60
End: 2025-01-24
Payer: COMMERCIAL

## 2025-01-24 NOTE — TELEPHONE ENCOUNTER
She has two questions:  1. she had gone to the ER and was given Flexeril and she is asking if you can send in more for her, she said it is helping better than the methocarbamol.    2. She had the test done on her liver and it is a little larger than last year.  Her question is:  Could all the problems she is having be from the fact that she has only a partial gallbladder? And if so, can you order a HIDA scan before her appointment in February    If you want to send in more Flexeril, she uses the [facility] in [place]

## 2025-02-04 ENCOUNTER — DOCUMENTATION (OUTPATIENT)
Dept: PHYSICAL THERAPY | Facility: HOSPITAL | Age: 60
End: 2025-02-04
Payer: COMMERCIAL

## 2025-02-04 NOTE — PROGRESS NOTES
Physical Therapy    Discharge Summary    Name: Sinai Mcbride  MRN: [id number]  : 1965  Date: 25    Discharge Summary: PT    Discharge Information: Date of discharge 2025, Date of last visit 2024, Date of evaluation 2024, Number of attended visits 2, Referred by Dr. Acosta, and Referred for chronic midline low back pain without sciatica    Therapy Summary:  Patient is a 59 year old female who presents to clinic with complaints of low back pain. Patient has a prior medical history including: cervical radiculopathy, thoracic spine pain, chronic pain of right upper extremity, degeneration of lumbar or lumbosacral intervertebral disc, thoracic disc herniation, insomnia, anxiety, PTSD.     Discharge Status: Patient demonstrated good tolerance to exercises with improvement in low back pain post exercises. Patient demonstrated difficulty performing step ups and standing hip exercises due to weakness.   PT Assessment     Rehab Discharge Reason: Failed to schedule and/or keep follow-up appointment(s)

## 2025-02-04 NOTE — PROGRESS NOTES
Physical Therapy    Discharge Summary    Name: Sinai Mcbride  MRN: [id number]  : 1965  Date: 25    Discharge Summary: PT    Discharge Information: Date of discharge 2025, Date of last visit 2024, Date of evaluation 2024, Number of attended visits 11, Referred by Dr. Acosta, and Referred for cervical radiculopathy    Therapy Summary: Patient is a 59 year old female who presents to clinic with complains of cervical, thoracic, and lumbar pain. Patient presents to clinic with R sided pec./chest pain starting Spring of 2023. The patient has intermittent numbness into the B hands as well. The patient has seen oncology, pain management, and cardiology. Patient states that she wants to do therapy to feel better, but is discouraged due to not getting better after her last bout of physical therapy.     Discharge Status: Patient was limited in performing exercises involving her right hand due to to complaints of right wrist/thumb pain today. Patient verbalized decreased chest pain post therapy today.      Rehab Discharge Reason: Failed to schedule and/or keep follow-up appointment(s)

## 2025-02-07 ENCOUNTER — HOSPITAL ENCOUNTER (EMERGENCY)
Facility: HOSPITAL | Age: 60
Discharge: HOME | End: 2025-02-07
Attending: EMERGENCY MEDICINE
Payer: COMMERCIAL

## 2025-02-07 ENCOUNTER — APPOINTMENT (OUTPATIENT)
Dept: CARDIOLOGY | Facility: HOSPITAL | Age: 60
End: 2025-02-07
Payer: COMMERCIAL

## 2025-02-07 ENCOUNTER — APPOINTMENT (OUTPATIENT)
Dept: RADIOLOGY | Facility: HOSPITAL | Age: 60
End: 2025-02-07
Payer: COMMERCIAL

## 2025-02-07 ENCOUNTER — PHARMACY VISIT (OUTPATIENT)
Dept: PHARMACY | Facility: CLINIC | Age: 60
End: 2025-02-07
Payer: MEDICAID

## 2025-02-07 VITALS
WEIGHT: 200 LBS | SYSTOLIC BLOOD PRESSURE: 116 MMHG | HEIGHT: 60 IN | BODY MASS INDEX: 39.27 KG/M2 | DIASTOLIC BLOOD PRESSURE: 85 MMHG | TEMPERATURE: 97.6 F | RESPIRATION RATE: 16 BRPM | HEART RATE: 60 BPM | OXYGEN SATURATION: 95 %

## 2025-02-07 DIAGNOSIS — J45.901 EXACERBATION OF ASTHMA, UNSPECIFIED ASTHMA SEVERITY, UNSPECIFIED WHETHER PERSISTENT (HHS-HCC): Primary | ICD-10-CM

## 2025-02-07 LAB
ANION GAP SERPL CALC-SCNC: 10 MMOL/L (ref 10–20)
ATRIAL RATE: 54 BPM
BASOPHILS # BLD AUTO: 0.1 X10*3/UL (ref 0–0.1)
BASOPHILS NFR BLD AUTO: 1.4 %
BNP SERPL-MCNC: 58 PG/ML (ref 0–99)
BUN SERPL-MCNC: 8 MG/DL (ref 6–23)
CALCIUM SERPL-MCNC: 9.1 MG/DL (ref 8.6–10.3)
CHLORIDE SERPL-SCNC: 108 MMOL/L (ref 98–107)
CO2 SERPL-SCNC: 26 MMOL/L (ref 21–32)
CREAT SERPL-MCNC: 0.64 MG/DL (ref 0.5–1.05)
EGFRCR SERPLBLD CKD-EPI 2021: >90 ML/MIN/1.73M*2
EOSINOPHIL # BLD AUTO: 1.85 X10*3/UL (ref 0–0.7)
EOSINOPHIL NFR BLD AUTO: 26.3 %
ERYTHROCYTE [DISTWIDTH] IN BLOOD BY AUTOMATED COUNT: 12.7 % (ref 11.5–14.5)
FLUAV RNA RESP QL NAA+PROBE: NOT DETECTED
FLUBV RNA RESP QL NAA+PROBE: NOT DETECTED
GLUCOSE SERPL-MCNC: 98 MG/DL (ref 74–99)
HCT VFR BLD AUTO: 44.4 % (ref 36–46)
HGB BLD-MCNC: 14.9 G/DL (ref 12–16)
HOLD SPECIMEN: NORMAL
IMM GRANULOCYTES # BLD AUTO: 0.01 X10*3/UL (ref 0–0.7)
IMM GRANULOCYTES NFR BLD AUTO: 0.1 % (ref 0–0.9)
LYMPHOCYTES # BLD AUTO: 2.22 X10*3/UL (ref 1.2–4.8)
LYMPHOCYTES NFR BLD AUTO: 31.5 %
MCH RBC QN AUTO: 31.9 PG (ref 26–34)
MCHC RBC AUTO-ENTMCNC: 33.6 G/DL (ref 32–36)
MCV RBC AUTO: 95 FL (ref 80–100)
MONOCYTES # BLD AUTO: 0.62 X10*3/UL (ref 0.1–1)
MONOCYTES NFR BLD AUTO: 8.8 %
NEUTROPHILS # BLD AUTO: 2.24 X10*3/UL (ref 1.2–7.7)
NEUTROPHILS NFR BLD AUTO: 31.9 %
NRBC BLD-RTO: 0 /100 WBCS (ref 0–0)
P AXIS: 53 DEGREES
P OFFSET: 205 MS
P ONSET: 154 MS
PLATELET # BLD AUTO: 250 X10*3/UL (ref 150–450)
POTASSIUM SERPL-SCNC: 4 MMOL/L (ref 3.5–5.3)
PR INTERVAL: 146 MS
Q ONSET: 227 MS
QRS COUNT: 9 BEATS
QRS DURATION: 80 MS
QT INTERVAL: 422 MS
QTC CALCULATION(BAZETT): 400 MS
QTC FREDERICIA: 407 MS
R AXIS: -23 DEGREES
RBC # BLD AUTO: 4.67 X10*6/UL (ref 4–5.2)
SARS-COV-2 RNA RESP QL NAA+PROBE: NOT DETECTED
SODIUM SERPL-SCNC: 140 MMOL/L (ref 136–145)
T AXIS: -20 DEGREES
T OFFSET: 438 MS
VENTRICULAR RATE: 54 BPM
WBC # BLD AUTO: 7 X10*3/UL (ref 4.4–11.3)

## 2025-02-07 PROCEDURE — 99285 EMERGENCY DEPT VISIT HI MDM: CPT | Mod: 25 | Performed by: EMERGENCY MEDICINE

## 2025-02-07 PROCEDURE — 71275 CT ANGIOGRAPHY CHEST: CPT | Performed by: RADIOLOGY

## 2025-02-07 PROCEDURE — RXMED WILLOW AMBULATORY MEDICATION CHARGE

## 2025-02-07 PROCEDURE — 36415 COLL VENOUS BLD VENIPUNCTURE: CPT | Performed by: EMERGENCY MEDICINE

## 2025-02-07 PROCEDURE — 9420000001 HC RT PATIENT EDUCATION 5 MIN

## 2025-02-07 PROCEDURE — 85025 COMPLETE CBC W/AUTO DIFF WBC: CPT | Performed by: EMERGENCY MEDICINE

## 2025-02-07 PROCEDURE — 87636 SARSCOV2 & INF A&B AMP PRB: CPT | Performed by: EMERGENCY MEDICINE

## 2025-02-07 PROCEDURE — 93005 ELECTROCARDIOGRAM TRACING: CPT

## 2025-02-07 PROCEDURE — 94664 DEMO&/EVAL PT USE INHALER: CPT

## 2025-02-07 PROCEDURE — 83880 ASSAY OF NATRIURETIC PEPTIDE: CPT | Performed by: EMERGENCY MEDICINE

## 2025-02-07 PROCEDURE — 80048 BASIC METABOLIC PNL TOTAL CA: CPT | Performed by: EMERGENCY MEDICINE

## 2025-02-07 PROCEDURE — 82435 ASSAY OF BLOOD CHLORIDE: CPT | Performed by: EMERGENCY MEDICINE

## 2025-02-07 PROCEDURE — 71275 CT ANGIOGRAPHY CHEST: CPT

## 2025-02-07 PROCEDURE — 2500000002 HC RX 250 W HCPCS SELF ADMINISTERED DRUGS (ALT 637 FOR MEDICARE OP, ALT 636 FOR OP/ED): Mod: SE | Performed by: EMERGENCY MEDICINE

## 2025-02-07 PROCEDURE — 2550000001 HC RX 255 CONTRASTS: Mod: SE | Performed by: EMERGENCY MEDICINE

## 2025-02-07 PROCEDURE — 94760 N-INVAS EAR/PLS OXIMETRY 1: CPT

## 2025-02-07 PROCEDURE — 94640 AIRWAY INHALATION TREATMENT: CPT

## 2025-02-07 PROCEDURE — 2500000001 HC RX 250 WO HCPCS SELF ADMINISTERED DRUGS (ALT 637 FOR MEDICARE OP): Mod: SE | Performed by: EMERGENCY MEDICINE

## 2025-02-07 PROCEDURE — 2500000004 HC RX 250 GENERAL PHARMACY W/ HCPCS (ALT 636 FOR OP/ED): Mod: SE | Performed by: EMERGENCY MEDICINE

## 2025-02-07 RX ORDER — DEXAMETHASONE 6 MG/1
12 TABLET ORAL DAILY
Qty: 2 TABLET | Refills: 0 | Status: SHIPPED | OUTPATIENT
Start: 2025-02-07 | End: 2025-02-08

## 2025-02-07 RX ORDER — IPRATROPIUM BROMIDE AND ALBUTEROL SULFATE 2.5; .5 MG/3ML; MG/3ML
6 SOLUTION RESPIRATORY (INHALATION) ONCE
Status: COMPLETED | OUTPATIENT
Start: 2025-02-07 | End: 2025-02-07

## 2025-02-07 RX ORDER — ALBUTEROL SULFATE 90 UG/1
2 INHALANT RESPIRATORY (INHALATION) ONCE
Status: COMPLETED | OUTPATIENT
Start: 2025-02-07 | End: 2025-02-07

## 2025-02-07 RX ORDER — ALBUTEROL SULFATE 0.83 MG/ML
2.5 SOLUTION RESPIRATORY (INHALATION) ONCE
Status: COMPLETED | OUTPATIENT
Start: 2025-02-07 | End: 2025-02-07

## 2025-02-07 RX ADMIN — DEXAMETHASONE 10 MG: 6 TABLET ORAL at 09:40

## 2025-02-07 RX ADMIN — ALBUTEROL SULFATE 2 PUFF: 90 AEROSOL, METERED RESPIRATORY (INHALATION) at 12:18

## 2025-02-07 RX ADMIN — IOHEXOL 64 ML: 350 INJECTION, SOLUTION INTRAVENOUS at 10:27

## 2025-02-07 RX ADMIN — ALBUTEROL SULFATE 2.5 MG: 2.5 SOLUTION RESPIRATORY (INHALATION) at 09:40

## 2025-02-07 RX ADMIN — IPRATROPIUM BROMIDE AND ALBUTEROL SULFATE 6 ML: .5; 3 SOLUTION RESPIRATORY (INHALATION) at 09:40

## 2025-02-07 ASSESSMENT — PAIN SCALES - GENERAL
PAINLEVEL_OUTOF10: 4
PAINLEVEL_OUTOF10: 0 - NO PAIN

## 2025-02-07 ASSESSMENT — PAIN - FUNCTIONAL ASSESSMENT: PAIN_FUNCTIONAL_ASSESSMENT: 0-10

## 2025-02-07 ASSESSMENT — COLUMBIA-SUICIDE SEVERITY RATING SCALE - C-SSRS
1. IN THE PAST MONTH, HAVE YOU WISHED YOU WERE DEAD OR WISHED YOU COULD GO TO SLEEP AND NOT WAKE UP?: NO
2. HAVE YOU ACTUALLY HAD ANY THOUGHTS OF KILLING YOURSELF?: NO
6. HAVE YOU EVER DONE ANYTHING, STARTED TO DO ANYTHING, OR PREPARED TO DO ANYTHING TO END YOUR LIFE?: NO

## 2025-02-07 NOTE — ED PROVIDER NOTES
HPI   Chief Complaint   Patient presents with    Shortness of Breath       HPI        Patient History   Past Medical History:   Diagnosis Date    Anxiety     Asthma     Migraine     Personal history of other diseases of the circulatory system 07/11/2022    History of hypertension     Past Surgical History:   Procedure Laterality Date    OTHER SURGICAL HISTORY  07/11/2022    Cholecystectomy    OTHER SURGICAL HISTORY  07/11/2022    Tonsillectomy     Family History   Problem Relation Name Age of Onset    Other (degenerative joint/disk disease) Mother      Hypertension Mother      Other (scolio) Mother      Leukemia Father      Hypertension Father      Cancer Father      Skin cancer Brother      Alcohol abuse Brother      No Known Problems Daughter      No Known Problems Daughter      No Known Problems Son      Colon cancer Maternal Grandmother      Lung cancer Maternal Grandfather      Heart disease Paternal Grandmother      Stroke Paternal Grandmother      Heart disease Paternal Grandfather      Hypertension Sibling      Cancer Sibling       Social History     Tobacco Use    Smoking status: Former     Current packs/day: 0.00     Average packs/day: 0.5 packs/day for 15.0 years (7.5 ttl pk-yrs)     Types: Cigarettes     Start date: 11/17/2006     Quit date: 11/17/2021     Years since quitting: 3.2    Smokeless tobacco: Never   Vaping Use    Vaping status: Never Used   Substance Use Topics    Alcohol use: Yes     Comment: social    Drug use: Yes     Types: Marijuana       Physical Exam   ED Triage Vitals [02/07/25 0845]   Temperature Heart Rate Respirations BP   36.9 °C (98.4 °F) 56 20 133/78      Pulse Ox Temp Source Heart Rate Source Patient Position   94 % Temporal -- --      BP Location FiO2 (%)     -- --       Physical Exam  Constitutional:       General: She is not in acute distress.     Appearance: Normal appearance. She is not toxic-appearing.   HENT:      Head: Normocephalic and atraumatic.      Right Ear:  Tympanic membrane normal.      Left Ear: Tympanic membrane normal.      Mouth/Throat:      Mouth: Mucous membranes are moist.      Pharynx: Oropharynx is clear.   Eyes:      Conjunctiva/sclera: Conjunctivae normal.      Pupils: Pupils are equal, round, and reactive to light.   Cardiovascular:      Rate and Rhythm: Normal rate and regular rhythm.      Pulses: Normal pulses.      Heart sounds: Normal heart sounds.   Pulmonary:      Effort: Pulmonary effort is normal. No respiratory distress.      Breath sounds: Wheezing present.   Abdominal:      General: Bowel sounds are normal.      Palpations: Abdomen is soft.      Tenderness: There is no abdominal tenderness. There is no guarding or rebound.   Musculoskeletal:         General: Normal range of motion.      Cervical back: Normal range of motion.   Skin:     General: Skin is warm and dry.   Neurological:      General: No focal deficit present.      Mental Status: She is alert and oriented to person, place, and time.           ED Course & MDM   ED Course as of 02/07/25 1209   Fri Feb 07, 2025   1032 EKG performed at 909 showing normal sinus rhythm ventricular rate of 54.  No ST elevation or depression no indication of a STEMI at this time [[facility]]   1145 Improve BS [KA]      ED Course User Index  [KA] Nicholas Gr, DO         Diagnoses as of 02/07/25 1209   Exacerbation of asthma, unspecified asthma severity, unspecified whether persistent ([facility])                 No data recorded     [place] Coma Scale Score: 15 (02/07/25 0900 : Kasie Mcelroy, JOSE MANUEL)                           Medical Decision Making  59-year-old female presents to the ER with chief complaint of wheezing.  Patient is concerned that she is having fluid on her lungs patient has been multiple courses of antibiotics is not improving.  I explained to her that she probably has asthma and that she needs to see a specialist.  Patient reports that she feels better her wheezing has improved.  Her vital signs  are stable.  Will give a repeat dose of steroids for tomorrow also educated her on her metered-dose inhalers with a spacer.  Patient understands a plan explained to her she must see the specialist        Procedure  Procedures     Nicholas Gr, DO  02/07/25 1266

## 2025-02-07 NOTE — ED NOTES
A&O x4. States she has been short of breath since having bronchitis in January. States history of asthma. Has been doing breathing treatments at home with no relief. Respirations are 22. Coarse lung sounds, audible wheezing.     Kasie Mcelroy RN  02/07/25 7435

## 2025-02-10 ENCOUNTER — APPOINTMENT (OUTPATIENT)
Dept: ORTHOPEDIC SURGERY | Facility: CLINIC | Age: 60
End: 2025-02-10
Payer: COMMERCIAL

## 2025-02-10 ENCOUNTER — HOSPITAL ENCOUNTER (OUTPATIENT)
Dept: RADIOLOGY | Facility: CLINIC | Age: 60
Discharge: HOME | End: 2025-02-10
Payer: COMMERCIAL

## 2025-02-10 DIAGNOSIS — M79.645 PAIN OF LEFT THUMB: ICD-10-CM

## 2025-02-10 DIAGNOSIS — G56.03 BILATERAL CARPAL TUNNEL SYNDROME: Primary | ICD-10-CM

## 2025-02-10 DIAGNOSIS — M18.0 ARTHRITIS OF CARPOMETACARPAL (CMC) JOINT OF BOTH THUMBS: ICD-10-CM

## 2025-02-10 PROCEDURE — 73140 X-RAY EXAM OF FINGER(S): CPT | Mod: RT

## 2025-02-10 PROCEDURE — 73140 X-RAY EXAM OF FINGER(S): CPT | Mod: LT

## 2025-02-10 PROCEDURE — 99203 OFFICE O/P NEW LOW 30 MIN: CPT | Performed by: ORTHOPAEDIC SURGERY

## 2025-02-10 PROCEDURE — 1036F TOBACCO NON-USER: CPT | Performed by: ORTHOPAEDIC SURGERY

## 2025-02-10 ASSESSMENT — ENCOUNTER SYMPTOMS
WHEEZING: 0
ARTHRALGIAS: 1
FATIGUE: 0
FEVER: 0
BRUISES/BLEEDS EASILY: 0
CHILLS: 0
NUMBNESS: 1
SHORTNESS OF BREATH: 0

## 2025-02-10 NOTE — PROGRESS NOTES
Reason for Appointment  bilateral thumb pain    History of Present Illness  New patient is a 59 y.o. female here today for evaluation of left thumb pain. EMG on 9/9/24 was reviewed and showed mild to moderate bilateral carpal tunnel, right worse than left. She had bilateral carpal tunnel injections by Dr. Acosta on 9/19/24. X-rays taken of the bilateral thumbs on 2/10/25 were reviewed and showed bilateral mild to moderate cmc arthritis. Today she reports her biggest complaint is the sharp shooting pain. She get some carpal tunnel symptoms at night, but overall she is having no sig carpal tunnel sympotms. Pain with pinching gripping twisting and torquing. No fall or injury. Past medical history allergies medications social and family history all reviewed.       Past Medical History:   Diagnosis Date    Anxiety     Asthma     Migraine     Personal history of other diseases of the circulatory system 07/11/2022    History of hypertension       Past Surgical History:   Procedure Laterality Date    OTHER SURGICAL HISTORY  07/11/2022    Cholecystectomy    OTHER SURGICAL HISTORY  07/11/2022    Tonsillectomy       Medication Documentation Review Audit       Reviewed by Rima Hare RN (Registered Nurse) on 02/07/25 at 0847      Medication Order Taking? Sig Documenting Provider Last Dose Status   albuterol 2.5 mg /3 mL (0.083 %) nebulizer solution [id number]  Take 3 mL (2.5 mg) by nebulization every 4 hours if needed for wheezing or shortness of breath. Clement Zarate, DO  Active   albuterol 90 mcg/actuation inhaler [id number]  Inhale 2 puffs every 4 hours if needed for shortness of breath or wheezing. Clement Zarate, DO  Active   cyclobenzaprine (Flexeril) 5 mg tablet [id number]  Take 1 tablet (5 mg) by mouth 3 times a day as needed for muscle spasms. Clement Zarate, DO  Active   EPINEPHrine (Epipen) 0.3 mg/0.3 mL injection syringe [id number]  Inject 0.3 mL (0.3 mg) into the muscle if needed for anaphylaxis. Call 911 after  use. Clement Zarate DO  Active   etodolac XL (Lodine XL) 500 mg 24 hr tablet [id number]  TAKE 1 TABLET BY MOUTH EVERY DAY Juan Rider PA-C  Active   ibuprofen 800 mg tablet [id number]  Take 1 tablet (800 mg) by mouth every 8 hours if needed. Historical Provider, MD  Active   lidocaine (Lidoderm) 5 % patch [id number]  Place 1 patch over 12 hours on the skin once daily. Apply to painful area 12 hours per day, remove for 12 hours. Clement Zarate DO  Active   LOSARTAN-HYDROCHLOROTHIAZIDE ORAL [id number]  Take 1 tablet by mouth once daily. Historical Provider, MD  Active   meclizine (Antivert) 25 mg tablet,chewable [id number]  Chew 1 tablet (25 mg) every 12 hours if needed. Historical Provider, MD  Active   methocarbamol (Robaxin) 750 mg tablet [id number]  TAKE 1 TABLET (750 MG) BY MOUTH 4 TIMES A DAY. Reji Acosta MD   25 1834   mometasone-formoterol (Dulera) 200-5 mcg/actuation inhaler [id number]  INHALE 2 PUFFS INTO THE LUNGS TWICE A DAY   Patient not taking: Reported on 1/10/2025    Clement Zarate DO  Active   pantoprazole (ProtoNix) 40 mg EC tablet [id number]  Take 1 tablet (40 mg) by mouth once daily. Historical Provider, MD  Active                    Allergies   Allergen Reactions    Bee Venom Protein (Honey Bee) Swelling and Anaphylaxis    Penicillin Hives     Reaction: Vomiting    Hydrocodone-Homatropine Itching    Hydrocodone Itching    Hydrocodone-Acetaminophen Unknown    Ace Inhibitors Cough    Codeine Other     Reaction: VOmiting    Other Other     Red Dye - Throat Swelling    Red Dye Unknown       Review of Systems   Constitutional:  Negative for chills, fatigue and fever.   Respiratory:  Negative for shortness of breath and wheezing.    Cardiovascular:  Negative for chest pain and leg swelling.   Musculoskeletal:  Positive for arthralgias.   Allergic/Immunologic: Negative for immunocompromised state.   Neurological:  Positive for numbness.   Hematological:  Does not bruise/bleed easily.        Exam   Pt is alert awake, orientated to person place and time. No acute distress. Mood is good. Good pulses. Bilateral cmc tenderness. Mp joints are stable.     Assessment   Encounter Diagnosis   Name Primary?    Pain of left thumb    Bilateral carpal tunnel  Bilateral cmc arthritis    Plan     At this point most of her pain is bilateral cmc's. We got her bilateral comfort cool braces and we discussed brace wear. We discussed using Voltaren gel. We discussed for her carpal tunnel bracing due to no sig symptoms today, she is possibly still benefiting from the past injection by Dr. Acosta.      I, Thalia Florez, attest that this documentation has been prepared under the direction and in the presence of Shmuel Lino MD.   By signing below, I, Shmuel Lino MD, personally performed the services described in this documentation. All medical record entries made by the scribe were at my direction and in my presence. I have reviewed the chart and agree that the record reflects my personal performance and is accurate and complete.

## 2025-02-12 ASSESSMENT — ENCOUNTER SYMPTOMS
DIZZINESS: 0
DIFFICULTY URINATING: 0
NAUSEA: 0
DIARRHEA: 0
SHORTNESS OF BREATH: 0
ENDOCRINE NEGATIVE: 1
FEVER: 0

## 2025-02-14 ENCOUNTER — OFFICE VISIT (OUTPATIENT)
Dept: PRIMARY CARE | Facility: CLINIC | Age: 60
End: 2025-02-14
Payer: COMMERCIAL

## 2025-02-14 VITALS
DIASTOLIC BLOOD PRESSURE: 78 MMHG | SYSTOLIC BLOOD PRESSURE: 120 MMHG | TEMPERATURE: 98.2 F | HEIGHT: 60 IN | BODY MASS INDEX: 43.39 KG/M2 | HEART RATE: 64 BPM | WEIGHT: 221 LBS | OXYGEN SATURATION: 96 %

## 2025-02-14 DIAGNOSIS — M54.9 DORSALGIA: ICD-10-CM

## 2025-02-14 DIAGNOSIS — M54.6 CHRONIC RIGHT-SIDED THORACIC BACK PAIN: ICD-10-CM

## 2025-02-14 DIAGNOSIS — G89.29 CHRONIC RIGHT-SIDED THORACIC BACK PAIN: ICD-10-CM

## 2025-02-14 DIAGNOSIS — J45.30 MILD PERSISTENT ASTHMA WITHOUT COMPLICATION (HHS-HCC): Primary | ICD-10-CM

## 2025-02-14 PROCEDURE — 1036F TOBACCO NON-USER: CPT | Performed by: FAMILY MEDICINE

## 2025-02-14 PROCEDURE — 99214 OFFICE O/P EST MOD 30 MIN: CPT | Mod: 25 | Performed by: FAMILY MEDICINE

## 2025-02-14 PROCEDURE — 3008F BODY MASS INDEX DOCD: CPT | Performed by: FAMILY MEDICINE

## 2025-02-14 PROCEDURE — 3074F SYST BP LT 130 MM HG: CPT | Performed by: FAMILY MEDICINE

## 2025-02-14 PROCEDURE — 99214 OFFICE O/P EST MOD 30 MIN: CPT | Performed by: FAMILY MEDICINE

## 2025-02-14 PROCEDURE — 2500000004 HC RX 250 GENERAL PHARMACY W/ HCPCS (ALT 636 FOR OP/ED): Mod: JZ | Performed by: FAMILY MEDICINE

## 2025-02-14 PROCEDURE — 96372 THER/PROPH/DIAG INJ SC/IM: CPT | Performed by: FAMILY MEDICINE

## 2025-02-14 PROCEDURE — 3078F DIAST BP <80 MM HG: CPT | Performed by: FAMILY MEDICINE

## 2025-02-14 RX ORDER — BUDESONIDE AND FORMOTEROL FUMARATE DIHYDRATE 160; 4.5 UG/1; UG/1
2 AEROSOL RESPIRATORY (INHALATION)
Qty: 10.2 G | Refills: 11 | Status: SHIPPED | OUTPATIENT
Start: 2025-02-14 | End: 2026-02-14

## 2025-02-14 RX ORDER — METHYLPREDNISOLONE ACETATE 80 MG/ML
160 INJECTION, SUSPENSION INTRA-ARTICULAR; INTRALESIONAL; INTRAMUSCULAR; SOFT TISSUE ONCE
Status: COMPLETED | OUTPATIENT
Start: 2025-02-14 | End: 2025-02-14

## 2025-02-14 RX ORDER — AZITHROMYCIN 500 MG/1
500 TABLET, FILM COATED ORAL DAILY
Qty: 5 TABLET | Refills: 0 | Status: SHIPPED | OUTPATIENT
Start: 2025-02-14 | End: 2025-02-19

## 2025-02-14 RX ORDER — TIZANIDINE 4 MG/1
4 TABLET ORAL 3 TIMES DAILY PRN
Qty: 30 TABLET | Refills: 1 | Status: SHIPPED | OUTPATIENT
Start: 2025-02-14

## 2025-02-14 RX ORDER — PREDNISONE 10 MG/1
TABLET ORAL
Qty: 21 TABLET | Refills: 0 | Status: SHIPPED | OUTPATIENT
Start: 2025-02-14 | End: 2025-02-20

## 2025-02-14 RX ORDER — METHYLPREDNISOLONE SODIUM SUCCINATE 125 MG/2ML
125 INJECTION INTRAMUSCULAR; INTRAVENOUS ONCE
Status: DISCONTINUED | OUTPATIENT
Start: 2025-02-14 | End: 2025-02-14

## 2025-02-14 RX ADMIN — METHYLPREDNISOLONE ACETATE 160 MG: 80 INJECTION, SUSPENSION INTRA-ARTICULAR; INTRALESIONAL; INTRAMUSCULAR; SOFT TISSUE at 12:45

## 2025-02-14 ASSESSMENT — ENCOUNTER SYMPTOMS
DIARRHEA: 0
DIFFICULTY URINATING: 0
FEVER: 0
SHORTNESS OF BREATH: 0
ENDOCRINE NEGATIVE: 1
NAUSEA: 0
BACK PAIN: 1
WHEEZING: 1
DIZZINESS: 0

## 2025-02-14 ASSESSMENT — PAIN SCALES - GENERAL: PAINLEVEL_OUTOF10: 7

## 2025-02-14 NOTE — PROGRESS NOTES
Subjective   Patient ID: Sinai Mcbride is a 59 y.o. female who presents for Follow-up (US result), Shortness of Breath (Went to [facility]), Back Pain (Discuss surgery options), and Medication Problem (Alternative to etodolac/).    Last clinic visit summary (1/10/2025)  -Patient with chronic back pain: Prescribed lidocaine patches to treat this condition.  -Shortness of breath, exertional dyspnea: Prescribed doxycycline and albuterol inhaler/nebulizer to treat this condition.  -Chronic abdominal pain, history of hepatomegaly  -Left thumb pain: Referred pt to orthopaedic surgery for further evaluation.       HPI   The pt presents to the clinic for a 1-month follow-up. Past medical hx of HTN, chest pain, GERD, anxiety, PTSD, ADD, asthma, and insomnia.    -Asthma/Wheezing: Pt recalls that she visited the ER on 2/7/2025 with concerns of wheezing. Pt believes that her wheezing is exacerbated in cold weather. CT angio chest scan conducted at ER for investigation. Mild aeration observed on CT scan. Pt informed by ER provider that she is likely suffering from asthma. Now, pt was still wheezing today when checked in clinic. Pt informed about importance of treating asthma. She has been taking albuterol inhaler/nebulizer on PRN basis but she is not taking any daily inhalers to treat asthma at this time. Following discussion, pt received prescription for Symbicort inhaler to treat this condition. In addition, pt received methylprednisolone injection in clinic today and she received prescriptions for prednisone and Zithromax medications to treat associated chest congestion.     -Chronic back pain: Pt inquires about receiving new medication to control pain symptoms. Previously, taking Robaxin and etodolac XL to treat pain but these meds are no longer controlling her symptoms. Instead, pt received prescription for tizanidine medication to control pain symptoms.    Review of Systems   Constitutional:  Negative for fever.        Also  see HPI   Eyes:  Negative for visual disturbance.   Respiratory:  Positive for wheezing. Negative for shortness of breath.    Cardiovascular:  Negative for chest pain.   Gastrointestinal:  Negative for diarrhea and nausea.   Endocrine: Negative.    Genitourinary:  Negative for difficulty urinating.   Musculoskeletal:  Positive for back pain.   Skin:  Negative for rash.   Neurological:  Negative for dizziness.        No focal deficits   Psychiatric/Behavioral:  Negative for suicidal ideas.    All other systems reviewed and are negative.      Objective   /78   Pulse 64   Temp 36.8 °C (98.2 °F)   Ht 1.524 m (5')   Wt 100 kg (221 lb)   SpO2 96%   BMI 43.16 kg/m²     Physical Exam  Vitals and nursing note reviewed.   Constitutional:       Appearance: Normal appearance.   HENT:      Head: Normocephalic and atraumatic.   Eyes:      Conjunctiva/sclera: Conjunctivae normal.   Cardiovascular:      Rate and Rhythm: Normal rate and regular rhythm.      Heart sounds: Normal heart sounds.   Pulmonary:      Effort: Pulmonary effort is normal.      Breath sounds: Wheezing present.   Musculoskeletal:      Lumbar back: Tenderness and bony tenderness present. Decreased range of motion.   Neurological:      Mental Status: She is oriented to person, place, and time.   Psychiatric:         Mood and Affect: Mood normal.         Behavior: Behavior normal.         Assessment/Plan   Diagnoses and all orders for this visit:  Mild persistent asthma without complication ([facility])  -     budesonide-formoteroL (Symbicort) 160-4.5 mcg/actuation inhaler; Inhale 2 puffs 2 times a day. Rinse mouth with water after use to reduce aftertaste and incidence of candidiasis. Do not swallow.  -     predniSONE (Deltasone) 10 mg tablet; Take 6 tablets (60 mg) by mouth once daily for 1 day, THEN 5 tablets (50 mg) once daily for 1 day, THEN 4 tablets (40 mg) once daily for 1 day, THEN 3 tablets (30 mg) once daily for 1 day, THEN 2 tablets (20 mg)  once daily for 1 day, THEN 1 tablet (10 mg) once daily for 1 day.  -     azithromycin (Zithromax) 500 mg tablet; Take 1 tablet (500 mg) by mouth once daily for 5 days.  -     methylPREDNISolone acetate (DEPO-Medrol) injection 160 mg  Chronic right-sided thoracic back pain  -     Follow Up In Primary Care - Established  Dorsalgia  -     Follow Up In Primary Care - Established  -     tiZANidine (Zanaflex) 4 mg tablet; Take 1 tablet (4 mg) by mouth 3 times a day as needed for muscle spasms.         Scribe Attestation  By signing my name below, I, John Quezada , Scrgwendolyn   attest that this documentation has been prepared under the direction and in the presence of Clement Zarate DO.

## 2025-02-18 ENCOUNTER — APPOINTMENT (OUTPATIENT)
Dept: CARDIOLOGY | Facility: HOSPITAL | Age: 60
End: 2025-02-18
Payer: COMMERCIAL

## 2025-02-18 ENCOUNTER — HOSPITAL ENCOUNTER (INPATIENT)
Facility: HOSPITAL | Age: 60
End: 2025-02-18
Attending: INTERNAL MEDICINE | Admitting: INTERNAL MEDICINE
Payer: COMMERCIAL

## 2025-02-18 ENCOUNTER — APPOINTMENT (OUTPATIENT)
Dept: RADIOLOGY | Facility: HOSPITAL | Age: 60
End: 2025-02-18
Payer: COMMERCIAL

## 2025-02-18 ENCOUNTER — HOSPITAL ENCOUNTER (INPATIENT)
Facility: HOSPITAL | Age: 60
LOS: 1 days | Discharge: HOME | End: 2025-02-20
Attending: FAMILY MEDICINE | Admitting: INTERNAL MEDICINE
Payer: COMMERCIAL

## 2025-02-18 DIAGNOSIS — J10.1 INFLUENZA A: ICD-10-CM

## 2025-02-18 DIAGNOSIS — R06.02 SOB (SHORTNESS OF BREATH): Primary | ICD-10-CM

## 2025-02-18 DIAGNOSIS — R06.2 WHEEZING: ICD-10-CM

## 2025-02-18 DIAGNOSIS — R68.89 FLU-LIKE SYMPTOMS: ICD-10-CM

## 2025-02-18 DIAGNOSIS — J45.21 EXACERBATION OF INTERMITTENT ASTHMA, UNSPECIFIED ASTHMA SEVERITY (HHS-HCC): ICD-10-CM

## 2025-02-18 DIAGNOSIS — R06.02 SHORTNESS OF BREATH: ICD-10-CM

## 2025-02-18 DIAGNOSIS — R09.02 HYPOXIA: ICD-10-CM

## 2025-02-18 LAB
ALBUMIN SERPL BCP-MCNC: 4 G/DL (ref 3.4–5)
ALP SERPL-CCNC: 56 U/L (ref 33–110)
ALT SERPL W P-5'-P-CCNC: 11 U/L (ref 7–45)
ANION GAP SERPL CALC-SCNC: 12 MMOL/L (ref 10–20)
AST SERPL W P-5'-P-CCNC: 11 U/L (ref 9–39)
ATRIAL RATE: 55 BPM
BASOPHILS # BLD AUTO: 0.06 X10*3/UL (ref 0–0.1)
BASOPHILS NFR BLD AUTO: 0.8 %
BILIRUB SERPL-MCNC: 0.5 MG/DL (ref 0–1.2)
BUN SERPL-MCNC: 8 MG/DL (ref 6–23)
CALCIUM SERPL-MCNC: 9.3 MG/DL (ref 8.6–10.3)
CHLORIDE SERPL-SCNC: 103 MMOL/L (ref 98–107)
CO2 SERPL-SCNC: 25 MMOL/L (ref 21–32)
CREAT SERPL-MCNC: 0.71 MG/DL (ref 0.5–1.05)
EGFRCR SERPLBLD CKD-EPI 2021: >90 ML/MIN/1.73M*2
EOSINOPHIL # BLD AUTO: 0.05 X10*3/UL (ref 0–0.7)
EOSINOPHIL NFR BLD AUTO: 0.7 %
ERYTHROCYTE [DISTWIDTH] IN BLOOD BY AUTOMATED COUNT: 13.5 % (ref 11.5–14.5)
FLUAV RNA RESP QL NAA+PROBE: DETECTED
FLUBV RNA RESP QL NAA+PROBE: NOT DETECTED
GLUCOSE SERPL-MCNC: 99 MG/DL (ref 74–99)
HCT VFR BLD AUTO: 45.9 % (ref 36–46)
HGB BLD-MCNC: 15.1 G/DL (ref 12–16)
IMM GRANULOCYTES # BLD AUTO: 0.03 X10*3/UL (ref 0–0.7)
IMM GRANULOCYTES NFR BLD AUTO: 0.4 % (ref 0–0.9)
LYMPHOCYTES # BLD AUTO: 2.64 X10*3/UL (ref 1.2–4.8)
LYMPHOCYTES NFR BLD AUTO: 36.4 %
MAGNESIUM SERPL-MCNC: 1.97 MG/DL (ref 1.6–2.4)
MCH RBC QN AUTO: 31.3 PG (ref 26–34)
MCHC RBC AUTO-ENTMCNC: 32.9 G/DL (ref 32–36)
MCV RBC AUTO: 95 FL (ref 80–100)
MONOCYTES # BLD AUTO: 1.11 X10*3/UL (ref 0.1–1)
MONOCYTES NFR BLD AUTO: 15.3 %
NEUTROPHILS # BLD AUTO: 3.36 X10*3/UL (ref 1.2–7.7)
NEUTROPHILS NFR BLD AUTO: 46.4 %
NRBC BLD-RTO: 0 /100 WBCS (ref 0–0)
P AXIS: 61 DEGREES
P OFFSET: 213 MS
P ONSET: 162 MS
PLATELET # BLD AUTO: 222 X10*3/UL (ref 150–450)
POTASSIUM SERPL-SCNC: 3.4 MMOL/L (ref 3.5–5.3)
PR INTERVAL: 126 MS
PROT SERPL-MCNC: 6.6 G/DL (ref 6.4–8.2)
Q ONSET: 225 MS
QRS COUNT: 8 BEATS
QRS DURATION: 94 MS
QT INTERVAL: 428 MS
QTC CALCULATION(BAZETT): 409 MS
QTC FREDERICIA: 415 MS
R AXIS: -18 DEGREES
RBC # BLD AUTO: 4.83 X10*6/UL (ref 4–5.2)
RSV RNA RESP QL NAA+PROBE: NOT DETECTED
SARS-COV-2 RNA RESP QL NAA+PROBE: NOT DETECTED
SODIUM SERPL-SCNC: 137 MMOL/L (ref 136–145)
T AXIS: 9 DEGREES
T OFFSET: 439 MS
VENTRICULAR RATE: 55 BPM
WBC # BLD AUTO: 7.3 X10*3/UL (ref 4.4–11.3)

## 2025-02-18 PROCEDURE — 94640 AIRWAY INHALATION TREATMENT: CPT

## 2025-02-18 PROCEDURE — 94664 DEMO&/EVAL PT USE INHALER: CPT

## 2025-02-18 PROCEDURE — 87637 SARSCOV2&INF A&B&RSV AMP PRB: CPT | Performed by: FAMILY MEDICINE

## 2025-02-18 PROCEDURE — 9420000001 HC RT PATIENT EDUCATION 5 MIN

## 2025-02-18 PROCEDURE — 2500000002 HC RX 250 W HCPCS SELF ADMINISTERED DRUGS (ALT 637 FOR MEDICARE OP, ALT 636 FOR OP/ED): Mod: SE

## 2025-02-18 PROCEDURE — 2500000002 HC RX 250 W HCPCS SELF ADMINISTERED DRUGS (ALT 637 FOR MEDICARE OP, ALT 636 FOR OP/ED): Mod: SE | Performed by: EMERGENCY MEDICINE

## 2025-02-18 PROCEDURE — 96372 THER/PROPH/DIAG INJ SC/IM: CPT | Performed by: NURSE PRACTITIONER

## 2025-02-18 PROCEDURE — 2500000001 HC RX 250 WO HCPCS SELF ADMINISTERED DRUGS (ALT 637 FOR MEDICARE OP): Mod: SE

## 2025-02-18 PROCEDURE — 2500000004 HC RX 250 GENERAL PHARMACY W/ HCPCS (ALT 636 FOR OP/ED): Mod: SE | Performed by: NURSE PRACTITIONER

## 2025-02-18 PROCEDURE — 71045 X-RAY EXAM CHEST 1 VIEW: CPT

## 2025-02-18 PROCEDURE — 93005 ELECTROCARDIOGRAM TRACING: CPT

## 2025-02-18 PROCEDURE — 2500000004 HC RX 250 GENERAL PHARMACY W/ HCPCS (ALT 636 FOR OP/ED): Mod: JZ,SE

## 2025-02-18 PROCEDURE — 2500000005 HC RX 250 GENERAL PHARMACY W/O HCPCS: Mod: SE | Performed by: FAMILY MEDICINE

## 2025-02-18 PROCEDURE — 94760 N-INVAS EAR/PLS OXIMETRY 1: CPT

## 2025-02-18 PROCEDURE — 85025 COMPLETE CBC W/AUTO DIFF WBC: CPT | Performed by: FAMILY MEDICINE

## 2025-02-18 PROCEDURE — 2500000002 HC RX 250 W HCPCS SELF ADMINISTERED DRUGS (ALT 637 FOR MEDICARE OP, ALT 636 FOR OP/ED): Mod: SE | Performed by: NURSE PRACTITIONER

## 2025-02-18 PROCEDURE — 80053 COMPREHEN METABOLIC PANEL: CPT | Performed by: FAMILY MEDICINE

## 2025-02-18 PROCEDURE — 99285 EMERGENCY DEPT VISIT HI MDM: CPT | Performed by: FAMILY MEDICINE

## 2025-02-18 PROCEDURE — 2500000005 HC RX 250 GENERAL PHARMACY W/O HCPCS: Mod: SE | Performed by: NURSE PRACTITIONER

## 2025-02-18 PROCEDURE — 94640 AIRWAY INHALATION TREATMENT: CPT | Mod: 59

## 2025-02-18 PROCEDURE — 36415 COLL VENOUS BLD VENIPUNCTURE: CPT | Performed by: FAMILY MEDICINE

## 2025-02-18 PROCEDURE — G0378 HOSPITAL OBSERVATION PER HR: HCPCS

## 2025-02-18 PROCEDURE — 71045 X-RAY EXAM CHEST 1 VIEW: CPT | Performed by: SURGERY

## 2025-02-18 PROCEDURE — 2500000001 HC RX 250 WO HCPCS SELF ADMINISTERED DRUGS (ALT 637 FOR MEDICARE OP): Mod: SE | Performed by: NURSE PRACTITIONER

## 2025-02-18 PROCEDURE — 2500000004 HC RX 250 GENERAL PHARMACY W/ HCPCS (ALT 636 FOR OP/ED): Mod: SE | Performed by: FAMILY MEDICINE

## 2025-02-18 PROCEDURE — 83735 ASSAY OF MAGNESIUM: CPT | Performed by: FAMILY MEDICINE

## 2025-02-18 RX ORDER — IPRATROPIUM BROMIDE AND ALBUTEROL SULFATE 2.5; .5 MG/3ML; MG/3ML
3 SOLUTION RESPIRATORY (INHALATION) 3 TIMES DAILY
Status: DISCONTINUED | OUTPATIENT
Start: 2025-02-19 | End: 2025-02-20 | Stop reason: HOSPADM

## 2025-02-18 RX ORDER — MAGNESIUM SULFATE HEPTAHYDRATE 40 MG/ML
2 INJECTION, SOLUTION INTRAVENOUS ONCE
Status: COMPLETED | OUTPATIENT
Start: 2025-02-18 | End: 2025-02-18

## 2025-02-18 RX ORDER — PANTOPRAZOLE SODIUM 40 MG/1
40 TABLET, DELAYED RELEASE ORAL
Status: DISCONTINUED | OUTPATIENT
Start: 2025-02-19 | End: 2025-02-20 | Stop reason: HOSPADM

## 2025-02-18 RX ORDER — ACETAMINOPHEN 325 MG/1
650 TABLET ORAL EVERY 4 HOURS PRN
Status: DISCONTINUED | OUTPATIENT
Start: 2025-02-18 | End: 2025-02-20 | Stop reason: HOSPADM

## 2025-02-18 RX ORDER — ACETAMINOPHEN 325 MG/1
TABLET ORAL
Status: COMPLETED
Start: 2025-02-18 | End: 2025-02-18

## 2025-02-18 RX ORDER — LOSARTAN POTASSIUM 50 MG/1
100 TABLET ORAL DAILY
Status: DISCONTINUED | OUTPATIENT
Start: 2025-02-19 | End: 2025-02-20 | Stop reason: HOSPADM

## 2025-02-18 RX ORDER — ACETAMINOPHEN 160 MG/5ML
650 SOLUTION ORAL EVERY 4 HOURS PRN
Status: DISCONTINUED | OUTPATIENT
Start: 2025-02-18 | End: 2025-02-20 | Stop reason: HOSPADM

## 2025-02-18 RX ORDER — POLYETHYLENE GLYCOL 3350 17 G/17G
17 POWDER, FOR SOLUTION ORAL DAILY
Status: DISCONTINUED | OUTPATIENT
Start: 2025-02-18 | End: 2025-02-20 | Stop reason: HOSPADM

## 2025-02-18 RX ORDER — OSELTAMIVIR PHOSPHATE 75 MG/1
75 CAPSULE ORAL 2 TIMES DAILY
Status: DISCONTINUED | OUTPATIENT
Start: 2025-02-18 | End: 2025-02-20 | Stop reason: HOSPADM

## 2025-02-18 RX ORDER — OSELTAMIVIR PHOSPHATE 75 MG/1
CAPSULE ORAL
Status: COMPLETED
Start: 2025-02-18 | End: 2025-02-18

## 2025-02-18 RX ORDER — IPRATROPIUM BROMIDE AND ALBUTEROL SULFATE 2.5; .5 MG/3ML; MG/3ML
SOLUTION RESPIRATORY (INHALATION)
Status: COMPLETED
Start: 2025-02-18 | End: 2025-02-18

## 2025-02-18 RX ORDER — CYCLOBENZAPRINE HCL 5 MG
5 TABLET ORAL 3 TIMES DAILY PRN
Status: DISCONTINUED | OUTPATIENT
Start: 2025-02-18 | End: 2025-02-20 | Stop reason: HOSPADM

## 2025-02-18 RX ORDER — BENZONATATE 100 MG/1
100 CAPSULE ORAL 3 TIMES DAILY PRN
COMMUNITY

## 2025-02-18 RX ORDER — ENOXAPARIN SODIUM 100 MG/ML
40 INJECTION SUBCUTANEOUS EVERY 24 HOURS
Status: DISCONTINUED | OUTPATIENT
Start: 2025-02-18 | End: 2025-02-20 | Stop reason: HOSPADM

## 2025-02-18 RX ORDER — IPRATROPIUM BROMIDE AND ALBUTEROL SULFATE 2.5; .5 MG/3ML; MG/3ML
9 SOLUTION RESPIRATORY (INHALATION) ONCE
Status: COMPLETED | OUTPATIENT
Start: 2025-02-18 | End: 2025-02-18

## 2025-02-18 RX ORDER — AZITHROMYCIN 250 MG/1
500 TABLET, FILM COATED ORAL DAILY
Status: DISCONTINUED | OUTPATIENT
Start: 2025-02-18 | End: 2025-02-20 | Stop reason: HOSPADM

## 2025-02-18 RX ORDER — FLUTICASONE FUROATE AND VILANTEROL 200; 25 UG/1; UG/1
1 POWDER RESPIRATORY (INHALATION)
Status: DISCONTINUED | OUTPATIENT
Start: 2025-02-19 | End: 2025-02-20 | Stop reason: HOSPADM

## 2025-02-18 RX ORDER — ONDANSETRON HYDROCHLORIDE 2 MG/ML
4 INJECTION, SOLUTION INTRAVENOUS EVERY 8 HOURS PRN
Status: DISCONTINUED | OUTPATIENT
Start: 2025-02-18 | End: 2025-02-20 | Stop reason: HOSPADM

## 2025-02-18 RX ORDER — IBUPROFEN 400 MG/1
800 TABLET ORAL EVERY 8 HOURS PRN
Status: DISCONTINUED | OUTPATIENT
Start: 2025-02-18 | End: 2025-02-20 | Stop reason: HOSPADM

## 2025-02-18 RX ORDER — ONDANSETRON 4 MG/1
4 TABLET, ORALLY DISINTEGRATING ORAL EVERY 8 HOURS PRN
Status: DISCONTINUED | OUTPATIENT
Start: 2025-02-18 | End: 2025-02-20 | Stop reason: HOSPADM

## 2025-02-18 RX ORDER — OSELTAMIVIR PHOSPHATE 75 MG/1
75 CAPSULE ORAL ONCE
Status: COMPLETED | OUTPATIENT
Start: 2025-02-18 | End: 2025-02-18

## 2025-02-18 RX ORDER — LOSARTAN POTASSIUM AND HYDROCHLOROTHIAZIDE 25; 100 MG/1; MG/1
1 TABLET ORAL DAILY
Status: DISCONTINUED | OUTPATIENT
Start: 2025-02-19 | End: 2025-02-18

## 2025-02-18 RX ORDER — IPRATROPIUM BROMIDE AND ALBUTEROL SULFATE 2.5; .5 MG/3ML; MG/3ML
3 SOLUTION RESPIRATORY (INHALATION)
Status: DISCONTINUED | OUTPATIENT
Start: 2025-02-18 | End: 2025-02-18

## 2025-02-18 RX ORDER — ACETAMINOPHEN 325 MG/1
975 TABLET ORAL ONCE
Status: COMPLETED | OUTPATIENT
Start: 2025-02-18 | End: 2025-02-18

## 2025-02-18 RX ORDER — ACETAMINOPHEN 325 MG/1
650 TABLET ORAL EVERY 6 HOURS PRN
COMMUNITY
End: 2025-02-19 | Stop reason: ENTERED-IN-ERROR

## 2025-02-18 RX ORDER — HYDROCHLOROTHIAZIDE 25 MG/1
25 TABLET ORAL DAILY
Status: DISCONTINUED | OUTPATIENT
Start: 2025-02-19 | End: 2025-02-20 | Stop reason: HOSPADM

## 2025-02-18 RX ORDER — IPRATROPIUM BROMIDE AND ALBUTEROL SULFATE 2.5; .5 MG/3ML; MG/3ML
3 SOLUTION RESPIRATORY (INHALATION) EVERY 2 HOUR PRN
Status: DISCONTINUED | OUTPATIENT
Start: 2025-02-18 | End: 2025-02-20 | Stop reason: HOSPADM

## 2025-02-18 RX ORDER — LIDOCAINE 560 MG/1
1 PATCH PERCUTANEOUS; TOPICAL; TRANSDERMAL DAILY
Status: DISCONTINUED | OUTPATIENT
Start: 2025-02-18 | End: 2025-02-20 | Stop reason: HOSPADM

## 2025-02-18 RX ADMIN — OSELTAMIVIR 75 MG: 75 CAPSULE ORAL at 21:14

## 2025-02-18 RX ADMIN — MAGNESIUM SULFATE HEPTAHYDRATE 2 G: 40 INJECTION, SOLUTION INTRAVENOUS at 06:38

## 2025-02-18 RX ADMIN — OSELTAMIVIR PHOSPHATE 75 MG: 75 CAPSULE ORAL at 06:55

## 2025-02-18 RX ADMIN — ACETAMINOPHEN 975 MG: 325 TABLET, FILM COATED ORAL at 15:03

## 2025-02-18 RX ADMIN — IPRATROPIUM BROMIDE AND ALBUTEROL SULFATE 3 ML: .5; 3 SOLUTION RESPIRATORY (INHALATION) at 20:23

## 2025-02-18 RX ADMIN — IPRATROPIUM BROMIDE AND ALBUTEROL SULFATE 3 ML: .5; 3 SOLUTION RESPIRATORY (INHALATION) at 11:22

## 2025-02-18 RX ADMIN — ENOXAPARIN SODIUM 40 MG: 40 INJECTION SUBCUTANEOUS at 20:03

## 2025-02-18 RX ADMIN — Medication 2 L/MIN: at 06:36

## 2025-02-18 RX ADMIN — IPRATROPIUM BROMIDE AND ALBUTEROL SULFATE 9 ML: .5; 3 SOLUTION RESPIRATORY (INHALATION) at 05:48

## 2025-02-18 RX ADMIN — IPRATROPIUM BROMIDE AND ALBUTEROL SULFATE 9 ML: 2.5; .5 SOLUTION RESPIRATORY (INHALATION) at 05:48

## 2025-02-18 RX ADMIN — ACETAMINOPHEN 975 MG: 325 TABLET ORAL at 15:03

## 2025-02-18 RX ADMIN — Medication 4 L/MIN: at 08:53

## 2025-02-18 RX ADMIN — Medication 3 L/MIN: at 20:23

## 2025-02-18 RX ADMIN — IBUPROFEN 800 MG: 400 TABLET, FILM COATED ORAL at 19:08

## 2025-02-18 RX ADMIN — IPRATROPIUM BROMIDE AND ALBUTEROL SULFATE 3 ML: .5; 3 SOLUTION RESPIRATORY (INHALATION) at 15:03

## 2025-02-18 RX ADMIN — METHYLPREDNISOLONE SODIUM SUCCINATE 125 MG: 125 INJECTION, POWDER, FOR SOLUTION INTRAMUSCULAR; INTRAVENOUS at 05:45

## 2025-02-18 RX ADMIN — METHYLPREDNISOLONE SODIUM SUCCINATE 40 MG: 40 INJECTION, POWDER, FOR SOLUTION INTRAMUSCULAR; INTRAVENOUS at 20:02

## 2025-02-18 SDOH — ECONOMIC STABILITY: HOUSING INSECURITY
IN THE LAST 12 MONTHS, WAS THERE A TIME WHEN YOU DID NOT HAVE A STEADY PLACE TO SLEEP OR SLEPT IN A SHELTER (INCLUDING NOW)?: NO

## 2025-02-18 SDOH — SOCIAL STABILITY: SOCIAL INSECURITY: WITHIN THE LAST YEAR, HAVE YOU BEEN HUMILIATED OR EMOTIONALLY ABUSED IN OTHER WAYS BY YOUR PARTNER OR EX-PARTNER?: NO

## 2025-02-18 SDOH — ECONOMIC STABILITY: HOUSING INSECURITY: IN THE PAST 12 MONTHS HAS THE ELECTRIC, GAS, OIL, OR WATER COMPANY THREATENED TO SHUT OFF SERVICES IN YOUR HOME?: YES

## 2025-02-18 SDOH — ECONOMIC STABILITY: TRANSPORTATION INSECURITY
IN THE PAST 12 MONTHS, HAS THE LACK OF TRANSPORTATION KEPT YOU FROM MEDICAL APPOINTMENTS OR FROM GETTING MEDICATIONS?: NO

## 2025-02-18 SDOH — ECONOMIC STABILITY: FOOD INSECURITY: WITHIN THE PAST 12 MONTHS, THE FOOD YOU BOUGHT JUST DIDN'T LAST AND YOU DIDN'T HAVE MONEY TO GET MORE.: OFTEN TRUE

## 2025-02-18 SDOH — SOCIAL STABILITY: SOCIAL INSECURITY: ARE THERE ANY APPARENT SIGNS OF INJURIES/BEHAVIORS THAT COULD BE RELATED TO ABUSE/NEGLECT?: NO

## 2025-02-18 SDOH — SOCIAL STABILITY: SOCIAL INSECURITY: WERE YOU ABLE TO COMPLETE ALL THE BEHAVIORAL HEALTH SCREENINGS?: YES

## 2025-02-18 SDOH — SOCIAL STABILITY: SOCIAL INSECURITY
WITHIN THE LAST YEAR, HAVE YOU BEEN RAPED OR FORCED TO HAVE ANY KIND OF SEXUAL ACTIVITY BY YOUR PARTNER OR EX-PARTNER?: NO

## 2025-02-18 SDOH — ECONOMIC STABILITY: FOOD INSECURITY: WITHIN THE PAST 12 MONTHS, YOU WORRIED THAT YOUR FOOD WOULD RUN OUT BEFORE YOU GOT THE MONEY TO BUY MORE.: NEVER TRUE

## 2025-02-18 SDOH — SOCIAL STABILITY: SOCIAL INSECURITY: WITHIN THE LAST YEAR, HAVE YOU BEEN AFRAID OF YOUR PARTNER OR EX-PARTNER?: NO

## 2025-02-18 SDOH — ECONOMIC STABILITY: FOOD INSECURITY: WITHIN THE PAST 12 MONTHS, THE FOOD YOU BOUGHT JUST DIDN'T LAST AND YOU DIDN'T HAVE MONEY TO GET MORE.: NEVER TRUE

## 2025-02-18 SDOH — SOCIAL STABILITY: SOCIAL INSECURITY
WITHIN THE LAST YEAR, HAVE YOU BEEN KICKED, HIT, SLAPPED, OR OTHERWISE PHYSICALLY HURT BY YOUR PARTNER OR EX-PARTNER?: NO

## 2025-02-18 SDOH — ECONOMIC STABILITY: TRANSPORTATION INSECURITY: IN THE PAST 12 MONTHS, HAS LACK OF TRANSPORTATION KEPT YOU FROM MEDICAL APPOINTMENTS OR FROM GETTING MEDICATIONS?: NO

## 2025-02-18 SDOH — ECONOMIC STABILITY: FOOD INSECURITY: WITHIN THE PAST 12 MONTHS, YOU WORRIED THAT YOUR FOOD WOULD RUN OUT BEFORE YOU GOT MONEY TO BUY MORE.: OFTEN TRUE

## 2025-02-18 SDOH — ECONOMIC STABILITY: INCOME INSECURITY: IN THE LAST 12 MONTHS, WAS THERE A TIME WHEN YOU WERE NOT ABLE TO PAY THE MORTGAGE OR RENT ON TIME?: YES

## 2025-02-18 SDOH — ECONOMIC STABILITY: HOUSING INSECURITY

## 2025-02-18 SDOH — SOCIAL STABILITY: SOCIAL INSECURITY: HAS ANYONE EVER THREATENED TO HURT YOUR FAMILY OR YOUR PETS?: NO

## 2025-02-18 SDOH — SOCIAL STABILITY: SOCIAL INSECURITY: DO YOU FEEL ANYONE HAS EXPLOITED OR TAKEN ADVANTAGE OF YOU FINANCIALLY OR OF YOUR PERSONAL PROPERTY?: NO

## 2025-02-18 SDOH — ECONOMIC STABILITY: HOUSING INSECURITY: IN THE LAST 12 MONTHS, HOW MANY PLACES HAVE YOU LIVED?: 1

## 2025-02-18 SDOH — ECONOMIC STABILITY: FOOD INSECURITY: WITHIN THE PAST 12 MONTHS, YOU WORRIED THAT YOUR FOOD WOULD RUN OUT BEFORE YOU GOT THE MONEY TO BUY MORE.: OFTEN TRUE

## 2025-02-18 SDOH — ECONOMIC STABILITY: INCOME INSECURITY: IN THE PAST 12 MONTHS HAS THE ELECTRIC, GAS, OIL, OR WATER COMPANY THREATENED TO SHUT OFF SERVICES IN YOUR HOME?: NO

## 2025-02-18 SDOH — ECONOMIC STABILITY: GENERAL

## 2025-02-18 SDOH — SOCIAL STABILITY: SOCIAL INSECURITY: DO YOU FEEL UNSAFE GOING BACK TO THE PLACE WHERE YOU ARE LIVING?: NO

## 2025-02-18 SDOH — ECONOMIC STABILITY: HOUSING INSECURITY: IN THE LAST 12 MONTHS, WAS THERE A TIME WHEN YOU WERE NOT ABLE TO PAY THE MORTGAGE OR RENT ON TIME?: YES

## 2025-02-18 SDOH — SOCIAL STABILITY: SOCIAL INSECURITY: HAVE YOU HAD THOUGHTS OF HARMING ANYONE ELSE?: NO

## 2025-02-18 SDOH — SOCIAL STABILITY: SOCIAL INSECURITY: ARE YOU OR HAVE YOU BEEN THREATENED OR ABUSED PHYSICALLY, EMOTIONALLY, OR SEXUALLY BY ANYONE?: NO

## 2025-02-18 SDOH — SOCIAL STABILITY: SOCIAL INSECURITY: ABUSE: ADULT

## 2025-02-18 SDOH — SOCIAL STABILITY: SOCIAL INSECURITY: HAVE YOU HAD ANY THOUGHTS OF HARMING ANYONE ELSE?: NO

## 2025-02-18 SDOH — ECONOMIC STABILITY: FOOD INSECURITY

## 2025-02-18 SDOH — ECONOMIC STABILITY: TRANSPORTATION INSECURITY

## 2025-02-18 SDOH — SOCIAL STABILITY: SOCIAL INSECURITY: DOES ANYONE TRY TO KEEP YOU FROM HAVING/CONTACTING OTHER FRIENDS OR DOING THINGS OUTSIDE YOUR HOME?: NO

## 2025-02-18 SDOH — ECONOMIC STABILITY: TRANSPORTATION INSECURITY
IN THE PAST 12 MONTHS, HAS LACK OF TRANSPORTATION KEPT YOU FROM MEETINGS, WORK, OR FROM GETTING THINGS NEEDED FOR DAILY LIVING?: NO

## 2025-02-18 ASSESSMENT — ACTIVITIES OF DAILY LIVING (ADL)
PATIENT'S MEMORY ADEQUATE TO SAFELY COMPLETE DAILY ACTIVITIES?: YES
HEARING - RIGHT EAR: FUNCTIONAL
TOILETING: INDEPENDENT
LACK_OF_TRANSPORTATION: NO
GROOMING: INDEPENDENT
FEEDING YOURSELF: INDEPENDENT
ASSISTIVE_DEVICE: EYEGLASSES;CANE
BATHING: INDEPENDENT
WALKS IN HOME: INDEPENDENT
LACK_OF_TRANSPORTATION: NO
JUDGMENT_ADEQUATE_SAFELY_COMPLETE_DAILY_ACTIVITIES: YES
ADEQUATE_TO_COMPLETE_ADL: YES
DRESSING YOURSELF: INDEPENDENT
HEARING - LEFT EAR: FUNCTIONAL

## 2025-02-18 ASSESSMENT — LIFESTYLE VARIABLES
AUDIT-C TOTAL SCORE: 2
SKIP TO QUESTIONS 9-10: 0
PRESCIPTION_ABUSE_PAST_12_MONTHS: NO
AUDIT-C TOTAL SCORE: 2
HOW OFTEN DO YOU HAVE 6 OR MORE DRINKS ON ONE OCCASION: LESS THAN MONTHLY
HOW MANY STANDARD DRINKS CONTAINING ALCOHOL DO YOU HAVE ON A TYPICAL DAY: 1 OR 2
HOW OFTEN DO YOU HAVE A DRINK CONTAINING ALCOHOL: MONTHLY OR LESS
SUBSTANCE_ABUSE_PAST_12_MONTHS: YES

## 2025-02-18 ASSESSMENT — PAIN DESCRIPTION - LOCATION: LOCATION: HEAD

## 2025-02-18 ASSESSMENT — COGNITIVE AND FUNCTIONAL STATUS - GENERAL
DAILY ACTIVITIY SCORE: 24
MOBILITY SCORE: 24
PATIENT BASELINE BEDBOUND: NO

## 2025-02-18 ASSESSMENT — PAIN - FUNCTIONAL ASSESSMENT
PAIN_FUNCTIONAL_ASSESSMENT: 0-10
PAIN_FUNCTIONAL_ASSESSMENT: 0-10

## 2025-02-18 ASSESSMENT — PAIN SCALES - GENERAL
PAINLEVEL_OUTOF10: 4
PAINLEVEL_OUTOF10: 0 - NO PAIN
PAINLEVEL_OUTOF10: 0 - NO PAIN
PAINLEVEL_OUTOF10: 5 - MODERATE PAIN

## 2025-02-18 ASSESSMENT — SOCIAL DETERMINANTS OF HEALTH (SDOH): IN THE PAST 12 MONTHS, HAS THE ELECTRIC, GAS, OIL, OR WATER COMPANY THREATENED TO SHUT OFF SERVICE IN YOUR HOME?: YES

## 2025-02-18 NOTE — LETTER
February 20, 2025     Patient: Sinai Mcbride   YOB: 1965   Date of Visit: 2/18/2025       To Whom It May Concern:    Sinai Mcbride was admitted to the hospital from 2/18/25 - 2/20-25. May return to work on Monday 2/24/25 with no restrictions.     If you have any questions or concerns, please don't hesitate to call.         Sincerely,         Jayda Villafana, APRN-CNP

## 2025-02-18 NOTE — ED PROVIDER NOTES
HPI   Chief Complaint   Patient presents with    Flu Symptoms       59-year-old female with history of anxiety, asthma, and migraines comes to the ED with complaint of flulike symptoms and worsening shortness of breath with wheezing for the last several days.  Patient reports that family was at home and been sick with influenza is not sure that that she may have constantly from them but she noted her wheezing be more pronounced today despite using her inhalers and called ambulance and was brought to the ED.  Patient in the ED is alert, cooperative, appears uncomfortable, but in no distress.  Patient reports no other associate symptoms or complaints this time.      History provided by:  Medical records, EMS personnel and patient   used: No            Patient History   Past Medical History:   Diagnosis Date    Anxiety     Asthma     Migraine     Personal history of other diseases of the circulatory system 07/11/2022    History of hypertension     Past Surgical History:   Procedure Laterality Date    OTHER SURGICAL HISTORY  07/11/2022    Cholecystectomy    OTHER SURGICAL HISTORY  07/11/2022    Tonsillectomy     Family History   Problem Relation Name Age of Onset    Other (degenerative joint/disk disease) Mother      Hypertension Mother      Other (scolio) Mother      Leukemia Father      Hypertension Father      Cancer Father      Skin cancer Brother      Alcohol abuse Brother      No Known Problems Daughter      No Known Problems Daughter      No Known Problems Son      Colon cancer Maternal Grandmother      Lung cancer Maternal Grandfather      Heart disease Paternal Grandmother      Stroke Paternal Grandmother      Heart disease Paternal Grandfather      Hypertension Sibling      Cancer Sibling       Social History     Tobacco Use    Smoking status: Former     Current packs/day: 0.00     Average packs/day: 0.5 packs/day for 15.0 years (7.5 ttl pk-yrs)     Types: Cigarettes     Start date:  11/17/2006     Quit date: 11/17/2021     Years since quitting: 3.2    Smokeless tobacco: Never   Vaping Use    Vaping status: Never Used   Substance Use Topics    Alcohol use: Yes     Comment: social    Drug use: Yes     Types: Marijuana       Physical Exam   ED Triage Vitals [02/18/25 0538]   Temperature Heart Rate Respirations BP   36.7 °C (98.1 °F) 63 (!) 22 (!) 133/97      Pulse Ox Temp Source Heart Rate Source Patient Position   (!) 89 % Oral Monitor --      BP Location FiO2 (%)     Right arm --       Physical Exam  Vitals and nursing note reviewed.   Constitutional:       General: She is not in acute distress.     Appearance: She is well-developed.   HENT:      Head: Normocephalic and atraumatic.   Eyes:      Conjunctiva/sclera: Conjunctivae normal.   Cardiovascular:      Rate and Rhythm: Normal rate and regular rhythm.      Pulses: Normal pulses.      Heart sounds: Normal heart sounds, S1 normal and S2 normal. No murmur heard.  Pulmonary:      Effort: Pulmonary effort is normal. Tachypnea present. No respiratory distress.      Breath sounds: Wheezing present.   Abdominal:      Palpations: Abdomen is soft.      Tenderness: There is no abdominal tenderness.   Musculoskeletal:         General: No swelling.      Cervical back: Neck supple.   Skin:     General: Skin is warm and dry.      Capillary Refill: Capillary refill takes less than 2 seconds.   Neurological:      General: No focal deficit present.      Mental Status: She is alert and oriented to person, place, and time.      GCS: GCS eye subscore is 4. GCS verbal subscore is 5. GCS motor subscore is 6.   Psychiatric:         Mood and Affect: Mood normal.           ED Course & MDM   Diagnoses as of 02/18/25 0607   SOB (shortness of breath)   Wheezing   Flu-like symptoms                 No data recorded     Terry Coma Scale Score: 15 (02/18/25 0539 : Veronica Pang RN)                       Labs Reviewed   CBC WITH AUTO DIFFERENTIAL - Abnormal        Result Value    WBC 7.3      nRBC 0.0      RBC 4.83      Hemoglobin 15.1      Hematocrit 45.9      MCV 95      MCH 31.3      MCHC 32.9      RDW 13.5      Platelets 222      Neutrophils % 46.4      Immature Granulocytes %, Automated 0.4      Lymphocytes % 36.4      Monocytes % 15.3      Eosinophils % 0.7      Basophils % 0.8      Neutrophils Absolute 3.36      Immature Granulocytes Absolute, Automated 0.03      Lymphocytes Absolute 2.64      Monocytes Absolute 1.11 (*)     Eosinophils Absolute 0.05      Basophils Absolute 0.06     COMPREHENSIVE METABOLIC PANEL - Abnormal    Glucose 99      Sodium 137      Potassium 3.4 (*)     Chloride 103      Bicarbonate 25      Anion Gap 12      Urea Nitrogen 8      Creatinine 0.71      eGFR >90      Calcium 9.3      Albumin 4.0      Alkaline Phosphatase 56      Total Protein 6.6      AST 11      Bilirubin, Total 0.5      ALT 11     INFLUENZA A AND B PCR   SARS-COV-2 PCR   RSV PCR   MAGNESIUM     XR chest 1 view    (Results Pending)     0531 -- NSR rate 55.  Normal axis.  Normal intervals.  Nonspecific ST-T wave babies mom and brother sister changes with no findings of STEMI. Unchanged compared to old EKG      Medical Decision Making  Pt upon arrival to the ED appeared to be anxious and uncomfortable with mild tachypnea and mild hypoxia shortness of breath/.  Discussed with pt the presenting complaints and clinically findings.  Reviewed with pt the epic chart and counseled the patient on flu like symptoms and appropriate approach to management/treatments.  After assessment and evaluation IV line was started, labs sent, imaging ordered, EKG reviewed, IV Solu-Medrol given, started on hour-long aerosol treatment, started on IV magnesium sulfate, placed on supplemental oxygen, placed on cardiac monitor, and observed.  At this time care was transferred to [facility] provider Dr. Jacques.      Amount and/or Complexity of Data Reviewed  Independent Historian: EMS  External Data Reviewed:  labs, radiology, ECG and notes.  Labs: ordered. Decision-making details documented in ED Course.  Radiology: ordered. Decision-making details documented in ED Course.  ECG/medicine tests: ordered and independent interpretation performed. Decision-making details documented in ED Course.        Procedure  Procedures     Mamadou Valdes MD  02/18/25 0615

## 2025-02-18 NOTE — ED TRIAGE NOTES
Patient recently seen by PCP for asthma exacerbation. Family has been flu positive. Patient presents tonight with shortness of breathe and audibly wheezing. RT called to bedside for assess and treat

## 2025-02-18 NOTE — PROGRESS NOTES
Emergency Medicine Transition of Care Note.    I received Sinai Mcbride in signout from Dr. Valdes.  Please see the previous ED provider note for all HPI, PE and MDM up to the time of signout at 0700. This is in addition to the primary record.    In brief Sinai Mcbride is an 59 y.o. female presenting for   Chief Complaint   Patient presents with    Flu Symptoms     At the time of signout we were awaiting: reevaluation, disposition    Diagnoses as of 02/18/25 1657   SOB (shortness of breath)   Wheezing   Flu-like symptoms   Hypoxia   Exacerbation of intermittent asthma, unspecified asthma severity ([facility])       Medical Decision Making  Patient is a 59-year-old female who presented to the ED for wheezing, worsening shortness of breath, and flulike symptoms.  Patient was initially managed by the previous physician.  She was found to be influenza positive.  This is apparently patient's third visit for similar symptoms, with no improvement.  Patient was initiated on a breathing treatment, administered Solu-Medrol, and magnesium sulfate for her asthma exacerbation.  She was signed out to myself pending reevaluation and further disposition, likely with plans for admission given her increased work of breathing and failed outpatient management.    Patient's workup is positive for influenza A.  XR chest 1 view   Final Result   1.  No evidence of acute cardiopulmonary process.                  MACRO:   None        Signed by: Adiel Cornejo 2/18/2025 6:41 AM   Dictation workstation:   [id number]        At time of my evaluation, patient is again exhibiting expiratory wheezing.  She is therefore administered an additional DuoNeb breathing treatment.  She continues to require 3 L nasal cannula, normally on room air at baseline.  Patient was informed of their lab and imaging results, and all questions and concerns were answered. As there are no beds available here at [facility] currently, transfer planning for further management was  discussed at this time, to which the patient was agreeable.  I discussed the case with Dr. Juares, hospitalist at [facility], who accepts patient for transfer.    After about 12 hours awaiting a bed at [facility], a bed opened up on the floor upstairs.  Patient will therefore be admitted here to [facility].      Final diagnoses:   [R06.02] SOB (shortness of breath)   [R06.2] Wheezing   [R68.89] Flu-like symptoms       Procedure  Procedures    Lexie Jacques MD

## 2025-02-19 PROBLEM — J45.901 ASTHMA EXACERBATION (HHS-HCC): Status: ACTIVE | Noted: 2023-09-27

## 2025-02-19 PROBLEM — R06.02 SOB (SHORTNESS OF BREATH): Status: RESOLVED | Noted: 2025-02-19 | Resolved: 2025-02-19

## 2025-02-19 PROBLEM — R06.02 SOB (SHORTNESS OF BREATH): Status: ACTIVE | Noted: 2025-02-19

## 2025-02-19 LAB
ANION GAP SERPL CALC-SCNC: 13 MMOL/L (ref 10–20)
BUN SERPL-MCNC: 15 MG/DL (ref 6–23)
CALCIUM SERPL-MCNC: 9.3 MG/DL (ref 8.6–10.3)
CHLORIDE SERPL-SCNC: 105 MMOL/L (ref 98–107)
CO2 SERPL-SCNC: 22 MMOL/L (ref 21–32)
CREAT SERPL-MCNC: 0.52 MG/DL (ref 0.5–1.05)
EGFRCR SERPLBLD CKD-EPI 2021: >90 ML/MIN/1.73M*2
ERYTHROCYTE [DISTWIDTH] IN BLOOD BY AUTOMATED COUNT: 13.5 % (ref 11.5–14.5)
GLUCOSE SERPL-MCNC: 133 MG/DL (ref 74–99)
HCT VFR BLD AUTO: 46.7 % (ref 36–46)
HGB BLD-MCNC: 15.6 G/DL (ref 12–16)
MCH RBC QN AUTO: 31.6 PG (ref 26–34)
MCHC RBC AUTO-ENTMCNC: 33.4 G/DL (ref 32–36)
MCV RBC AUTO: 95 FL (ref 80–100)
NRBC BLD-RTO: 0 /100 WBCS (ref 0–0)
PLATELET # BLD AUTO: 231 X10*3/UL (ref 150–450)
POTASSIUM SERPL-SCNC: 4.3 MMOL/L (ref 3.5–5.3)
RBC # BLD AUTO: 4.94 X10*6/UL (ref 4–5.2)
SODIUM SERPL-SCNC: 136 MMOL/L (ref 136–145)
WBC # BLD AUTO: 8.8 X10*3/UL (ref 4.4–11.3)

## 2025-02-19 PROCEDURE — 9420000001 HC RT PATIENT EDUCATION 5 MIN: Mod: IPSPLIT

## 2025-02-19 PROCEDURE — 94760 N-INVAS EAR/PLS OXIMETRY 1: CPT | Mod: IPSPLIT

## 2025-02-19 PROCEDURE — 2500000005 HC RX 250 GENERAL PHARMACY W/O HCPCS: Mod: SE,IPSPLIT | Performed by: NURSE PRACTITIONER

## 2025-02-19 PROCEDURE — 2500000002 HC RX 250 W HCPCS SELF ADMINISTERED DRUGS (ALT 637 FOR MEDICARE OP, ALT 636 FOR OP/ED): Mod: SE,IPSPLIT | Performed by: STUDENT IN AN ORGANIZED HEALTH CARE EDUCATION/TRAINING PROGRAM

## 2025-02-19 PROCEDURE — 94664 DEMO&/EVAL PT USE INHALER: CPT | Mod: IPSPLIT

## 2025-02-19 PROCEDURE — 2500000001 HC RX 250 WO HCPCS SELF ADMINISTERED DRUGS (ALT 637 FOR MEDICARE OP): Mod: SE | Performed by: NURSE PRACTITIONER

## 2025-02-19 PROCEDURE — 2500000002 HC RX 250 W HCPCS SELF ADMINISTERED DRUGS (ALT 637 FOR MEDICARE OP, ALT 636 FOR OP/ED): Mod: SE,IPSPLIT

## 2025-02-19 PROCEDURE — 94640 AIRWAY INHALATION TREATMENT: CPT | Mod: IPSPLIT

## 2025-02-19 PROCEDURE — 2500000004 HC RX 250 GENERAL PHARMACY W/ HCPCS (ALT 636 FOR OP/ED): Mod: SE,IPSPLIT

## 2025-02-19 PROCEDURE — 85027 COMPLETE CBC AUTOMATED: CPT | Performed by: NURSE PRACTITIONER

## 2025-02-19 PROCEDURE — 1100000001 HC PRIVATE ROOM DAILY: Mod: IPSPLIT

## 2025-02-19 PROCEDURE — 80048 BASIC METABOLIC PNL TOTAL CA: CPT | Performed by: NURSE PRACTITIONER

## 2025-02-19 PROCEDURE — 2500000004 HC RX 250 GENERAL PHARMACY W/ HCPCS (ALT 636 FOR OP/ED): Mod: SE,IPSPLIT | Performed by: NURSE PRACTITIONER

## 2025-02-19 PROCEDURE — 36415 COLL VENOUS BLD VENIPUNCTURE: CPT | Performed by: NURSE PRACTITIONER

## 2025-02-19 PROCEDURE — 2500000002 HC RX 250 W HCPCS SELF ADMINISTERED DRUGS (ALT 637 FOR MEDICARE OP, ALT 636 FOR OP/ED): Mod: SE | Performed by: NURSE PRACTITIONER

## 2025-02-19 RX ORDER — PREDNISONE 10 MG/1
TABLET ORAL
COMMUNITY
Start: 2025-02-14 | End: 2025-02-20 | Stop reason: HOSPADM

## 2025-02-19 RX ORDER — LOSARTAN POTASSIUM 100 MG/1
100 TABLET ORAL DAILY
COMMUNITY

## 2025-02-19 RX ORDER — FLUTICASONE PROPIONATE 50 MCG
2 SPRAY, SUSPENSION (ML) NASAL NIGHTLY PRN
COMMUNITY

## 2025-02-19 RX ORDER — IBUPROFEN 200 MG
400 TABLET ORAL EVERY 8 HOURS PRN
COMMUNITY

## 2025-02-19 RX ORDER — CEFTRIAXONE 1 G/50ML
1 INJECTION, SOLUTION INTRAVENOUS EVERY 24 HOURS
Status: DISCONTINUED | OUTPATIENT
Start: 2025-02-19 | End: 2025-02-20 | Stop reason: HOSPADM

## 2025-02-19 RX ORDER — FLUTICASONE PROPIONATE 50 MCG
2 SPRAY, SUSPENSION (ML) NASAL NIGHTLY PRN
Status: DISCONTINUED | OUTPATIENT
Start: 2025-02-19 | End: 2025-02-20 | Stop reason: HOSPADM

## 2025-02-19 RX ADMIN — ENOXAPARIN SODIUM 40 MG: 40 INJECTION SUBCUTANEOUS at 20:20

## 2025-02-19 RX ADMIN — METHYLPREDNISOLONE SODIUM SUCCINATE 40 MG: 40 INJECTION, POWDER, FOR SOLUTION INTRAMUSCULAR; INTRAVENOUS at 11:03

## 2025-02-19 RX ADMIN — Medication 21 PERCENT: at 21:49

## 2025-02-19 RX ADMIN — PANTOPRAZOLE SODIUM 40 MG: 40 TABLET, DELAYED RELEASE ORAL at 06:36

## 2025-02-19 RX ADMIN — METHYLPREDNISOLONE SODIUM SUCCINATE 40 MG: 40 INJECTION, POWDER, FOR SOLUTION INTRAMUSCULAR; INTRAVENOUS at 19:46

## 2025-02-19 RX ADMIN — FLUTICASONE FUROATE AND VILANTEROL TRIFENATATE 1 PUFF: 200; 25 POWDER RESPIRATORY (INHALATION) at 09:49

## 2025-02-19 RX ADMIN — IPRATROPIUM BROMIDE AND ALBUTEROL SULFATE 3 ML: .5; 3 SOLUTION RESPIRATORY (INHALATION) at 15:47

## 2025-02-19 RX ADMIN — AZITHROMYCIN DIHYDRATE 500 MG: 250 TABLET, FILM COATED ORAL at 08:09

## 2025-02-19 RX ADMIN — HYDROCHLOROTHIAZIDE 25 MG: 25 TABLET ORAL at 08:09

## 2025-02-19 RX ADMIN — LOSARTAN POTASSIUM 100 MG: 50 TABLET, FILM COATED ORAL at 08:10

## 2025-02-19 RX ADMIN — CEFTRIAXONE 1 G: 1 INJECTION, SOLUTION INTRAVENOUS at 11:03

## 2025-02-19 RX ADMIN — OSELTAMIVIR 75 MG: 75 CAPSULE ORAL at 08:09

## 2025-02-19 RX ADMIN — ACETAMINOPHEN 650 MG: 325 TABLET, FILM COATED ORAL at 20:20

## 2025-02-19 RX ADMIN — METHYLPREDNISOLONE SODIUM SUCCINATE 40 MG: 40 INJECTION, POWDER, FOR SOLUTION INTRAMUSCULAR; INTRAVENOUS at 04:21

## 2025-02-19 RX ADMIN — OSELTAMIVIR 75 MG: 75 CAPSULE ORAL at 20:20

## 2025-02-19 RX ADMIN — IPRATROPIUM BROMIDE AND ALBUTEROL SULFATE 3 ML: .5; 3 SOLUTION RESPIRATORY (INHALATION) at 09:49

## 2025-02-19 RX ADMIN — IBUPROFEN 800 MG: 400 TABLET, FILM COATED ORAL at 08:09

## 2025-02-19 ASSESSMENT — COGNITIVE AND FUNCTIONAL STATUS - GENERAL
DAILY ACTIVITIY SCORE: 24
MOBILITY SCORE: 24
MOBILITY SCORE: 24
DAILY ACTIVITIY SCORE: 24

## 2025-02-19 ASSESSMENT — ENCOUNTER SYMPTOMS
EYES NEGATIVE: 1
SHORTNESS OF BREATH: 1
MUSCULOSKELETAL NEGATIVE: 1
CONSTITUTIONAL NEGATIVE: 1
ENDOCRINE NEGATIVE: 1
GASTROINTESTINAL NEGATIVE: 1
PSYCHIATRIC NEGATIVE: 1
HEMATOLOGIC/LYMPHATIC NEGATIVE: 1
NEUROLOGICAL NEGATIVE: 1
CARDIOVASCULAR NEGATIVE: 1
COUGH: 1
ALLERGIC/IMMUNOLOGIC NEGATIVE: 1

## 2025-02-19 ASSESSMENT — PAIN SCALES - GENERAL
PAINLEVEL_OUTOF10: 3
PAINLEVEL_OUTOF10: 2
PAINLEVEL_OUTOF10: 4
PAINLEVEL_OUTOF10: 0 - NO PAIN

## 2025-02-19 ASSESSMENT — PAIN - FUNCTIONAL ASSESSMENT
PAIN_FUNCTIONAL_ASSESSMENT: 0-10

## 2025-02-19 ASSESSMENT — PAIN DESCRIPTION - LOCATION: LOCATION: HEAD

## 2025-02-19 ASSESSMENT — ACTIVITIES OF DAILY LIVING (ADL): LACK_OF_TRANSPORTATION: NO

## 2025-02-19 NOTE — H&P
History Of Present Illness  Sinai Mcbride is a 59 y.o. female presenting with shortness of breath. Pt states she has been sick since Jan with bronchitis. She has been on a few rounds of antibiotics and steroids. She said her roommate came home with the flu last Tuesday. She saw her PCP on Friday and was started on azithro, prednisone, and symbicort. She came to the ED with increased wheezing. Workup in the ED revealed influenza A. Pt was admitted to med surg.     ED VS: T36.7, HR 63, RR 22, /97, Sp02 89%RA    Imaging: CXR- 1. No evidence of acute cardiopulmonary process.     Labs: Glu 99, Na 137, K 3.4, Bun/creat 8/0.71, Mg 1.97, WBC 7.3, H/H 15.1/45.9, Plt 222, Flu A+     Past Medical History  Past Medical History:   Diagnosis Date    Anxiety     Asthma     Migraine     Personal history of other diseases of the circulatory system 07/11/2022    History of hypertension       Surgical History  Past Surgical History:   Procedure Laterality Date    OTHER SURGICAL HISTORY  07/11/2022    Cholecystectomy    OTHER SURGICAL HISTORY  07/11/2022    Tonsillectomy        Social History  She reports that she quit smoking about 3 years ago. Her smoking use included cigarettes. She started smoking about 18 years ago. She has a 7.5 pack-year smoking history. She has never used smokeless tobacco. She reports current alcohol use. She reports current drug use. Drug: Marijuana.    Family History  Family History   Problem Relation Name Age of Onset    Other (degenerative joint/disk disease) Mother      Hypertension Mother      Other (scolio) Mother      Leukemia Father      Hypertension Father      Cancer Father      Skin cancer Brother      Alcohol abuse Brother      No Known Problems Daughter      No Known Problems Daughter      No Known Problems Son      Colon cancer Maternal Grandmother      Lung cancer Maternal Grandfather      Heart disease Paternal Grandmother      Stroke Paternal Grandmother      Heart disease Paternal  Grandfather      Hypertension Sibling      Cancer Sibling          Allergies  Bee venom protein (honey bee), Penicillin, Hydrocodone, Hydrocodone-homatropine, Ace inhibitors, Codeine, Hydrocodone-acetaminophen, Other, and Red dye    Review of Systems   Constitutional: Negative.    HENT: Negative.     Eyes: Negative.    Respiratory:  Positive for cough and shortness of breath.    Cardiovascular: Negative.    Gastrointestinal: Negative.    Endocrine: Negative.    Genitourinary: Negative.    Musculoskeletal: Negative.    Skin: Negative.    Allergic/Immunologic: Negative.    Neurological: Negative.    Hematological: Negative.    Psychiatric/Behavioral: Negative.          Physical Exam  Vitals reviewed.   Constitutional:       Appearance: She is obese.   HENT:      Head: Normocephalic and atraumatic.      Right Ear: External ear normal.      Left Ear: External ear normal.      Nose: Nose normal.      Mouth/Throat:      Pharynx: Oropharynx is clear.   Eyes:      Conjunctiva/sclera: Conjunctivae normal.   Cardiovascular:      Rate and Rhythm: Normal rate and regular rhythm.      Pulses: Normal pulses.      Heart sounds: Normal heart sounds.   Pulmonary:      Breath sounds: Wheezing present.   Abdominal:      General: Bowel sounds are normal.      Palpations: Abdomen is soft.   Musculoskeletal:         General: Normal range of motion.      Cervical back: Normal range of motion and neck supple.   Skin:     General: Skin is dry.   Neurological:      General: No focal deficit present.      Mental Status: She is alert and oriented to person, place, and time.   Psychiatric:         Mood and Affect: Mood normal.         Behavior: Behavior normal.       Last Recorded Vitals  Blood pressure (!) 151/92, pulse 59, temperature 36.4 °C (97.5 °F), temperature source Tympanic, resp. rate 20, height 1.524 m (5'), weight 93.8 kg (206 lb 12.7 oz), SpO2 94%.    Relevant Results  Scheduled medications  azithromycin, 500 mg, oral,  Daily  cefTRIAXone, 1 g, intravenous, q24h  enoxaparin, 40 mg, subcutaneous, q24h  influenza, 0.5 mL, intramuscular, During hospitalization  fluticasone furoate-vilanteroL, 1 puff, inhalation, Daily  hydroCHLOROthiazide, 25 mg, oral, Daily  ipratropium-albuteroL, 3 mL, nebulization, TID  lidocaine, 1 patch, transdermal, Daily  losartan, 100 mg, oral, Daily  methylPREDNISolone sodium succinate (PF), 40 mg, intravenous, q8h  oseltamivir, 75 mg, oral, BID  oxygen, , inhalation, Continuous - Inhalation  pantoprazole, 40 mg, oral, Daily before breakfast  polyethylene glycol, 17 g, oral, Daily      Continuous medications     PRN medications  PRN medications: acetaminophen **OR** acetaminophen, cyclobenzaprine, ibuprofen, ipratropium-albuteroL, ondansetron ODT **OR** ondansetron    Results for orders placed or performed during the hospital encounter of 02/18/25 (from the past 24 hours)   CBC   Result Value Ref Range    WBC 8.8 4.4 - 11.3 x10*3/uL    nRBC 0.0 0.0 - 0.0 /100 WBCs    RBC 4.94 4.00 - 5.20 x10*6/uL    Hemoglobin 15.6 12.0 - 16.0 g/dL    Hematocrit 46.7 (H) 36.0 - 46.0 %    MCV 95 80 - 100 fL    MCH 31.6 26.0 - 34.0 pg    MCHC 33.4 32.0 - 36.0 g/dL    RDW 13.5 11.5 - 14.5 %    Platelets 231 150 - 450 x10*3/uL   Basic metabolic panel   Result Value Ref Range    Glucose 133 (H) 74 - 99 mg/dL    Sodium 136 136 - 145 mmol/L    Potassium 4.3 3.5 - 5.3 mmol/L    Chloride 105 98 - 107 mmol/L    Bicarbonate 22 21 - 32 mmol/L    Anion Gap 13 10 - 20 mmol/L    Urea Nitrogen 15 6 - 23 mg/dL    Creatinine 0.52 0.50 - 1.05 mg/dL    eGFR >90 >60 mL/min/1.73m*2    Calcium 9.3 8.6 - 10.3 mg/dL     *Note: Due to a large number of results and/or encounters for the requested time period, some results have not been displayed. A complete set of results can be found in Results Review.        Assessment/Plan   Assessment & Plan  Influenza A    SOB (shortness of breath) (Resolved: 2/19/2025)    Asthma exacerbation  ([facility])    Hypertension      #Influenza A  #Asthma exacerbation  -CXR: 1. No evidence of acute cardiopulmonary process.   -WBC 7.3   -Influenza A +  -Influenza B/RSV/Covid neg   -PO azithro, ceftriaxone (Day 2)  -Solumedrol q8  -Tamiflu x5 days   -Bronchodilators   -Supplemental oxygen to maintain an sp02 greater than 92%  -Currently on 3L  -Baseline RA  -Isolation protocol    #Essential HTN  -Continue hydrochlorothiazide, losartan  -Monitor BP and HR    DVT ppx  -lovenox    PUD ppx  -pantoprazole    F: PRN  E: Replete per protocol  N: Regular  A: PIV    Disposition: Pt requires more than 2 inpatient days at this time   Code Status: Full Code      VIVIENNE Hernandez  Attending Attestation:    Patient was seen and examined face to face, history and physical was taken personally at bedside the APRN-CNP, was present for the whole duration of the exam who participated in the documentation of this note. I performed the medical decision-making components (assessment and plan of care). I have reviewed the documentation and verified the findings in the note as written with additions or exceptions as stated in the body of this note.  Patient with history of asthma, quit smoking 3 years ago, apparently has been having some upper respiratory infection with few rounds of antibiotics and steroids since January 2025.  Saw her primary physician, few days ago received Zithromax and tapering dose of prednisone, and Symbicort, he was told by primary doctor that Symbicort needs to be used as regular basis.  And apparently patient contracted flu from her roommate, continue to have wheezes with increasing shortness of breath came to emergency room workup showed positive influenza A, and chest x-ray was negative for any acute finding.  Patient was seen this morning she has multiple questions regarding wheezes, steroid and side effect of the steroids, and plan for her discharge.  All her question was answered, on exam she is still  wheezing diffusely, she does not have conversational dyspnea.  Continue with Solu-Medrol, bronchodilator and hopefully discharge home tomorrow if no complications    Dr. Navdeep Jackman MD  Internal Medicine

## 2025-02-19 NOTE — CARE PLAN
The patient's goals for the shift include      The clinical goals for the shift include Patient to be free from injury throughout this shift      Problem: Pain - Adult  Goal: Verbalizes/displays adequate comfort level or baseline comfort level  Outcome: Progressing     Problem: Safety - Adult  Goal: Free from fall injury  Outcome: Progressing     Problem: Discharge Planning  Goal: Discharge to home or other facility with appropriate resources  Outcome: Progressing     Problem: Chronic Conditions and Co-morbidities  Goal: Patient's chronic conditions and co-morbidity symptoms are monitored and maintained or improved  Outcome: Progressing     Problem: Nutrition  Goal: Nutrient intake appropriate for maintaining nutritional needs  Outcome: Progressing     Problem: Fall/Injury  Goal: Not fall by end of shift  Outcome: Progressing  Goal: Be free from injury by end of the shift  Outcome: Progressing  Goal: Verbalize understanding of personal risk factors for fall in the hospital  Outcome: Progressing  Goal: Verbalize understanding of risk factor reduction measures to prevent injury from fall in the home  Outcome: Progressing  Goal: Use assistive devices by end of the shift  Outcome: Progressing  Goal: Pace activities to prevent fatigue by end of the shift  Outcome: Progressing

## 2025-02-19 NOTE — CARE PLAN
The patient's goals for the shift include      The clinical goals for the shift include patient will be free from any falls or injury this shift    Over the shift, the patient did make progress toward the following goals.

## 2025-02-19 NOTE — PROGRESS NOTES
Pharmacy Medication History Review    Sinai Mcbride is a 59 y.o. female admitted for Influenza A. Pharmacy reviewed the patient's mraip-or-kelexundr medications and allergies for accuracy.    Sources used to confirm medication list: Informant interview  Informant: Patient  Informant credibility: Fair (Able to recall medication, indication, strength, frequency, and/or prescriber for >50% of medications).    Total number of adjustments: 9     Medications Added Medications Removed Medications Adjusted  Adjustments Made   Prednisone Tylenol Symbicort BID --> Once daily   Losartan Cyclobenzaprine     Ibuprofen 200 MG Ibuprofen 800 MG     Flonase Losartan/hydrochlorothiazide        The list below reflectives the updated PTA list. Please review each medication in order reconciliation for additional clarification and justification.  Medications Prior to Admission   Medication Sig Dispense Refill Last Dose/Taking    albuterol 2.5 mg /3 mL (0.083 %) nebulizer solution Take 3 mL (2.5 mg) by nebulization every 4 hours if needed for wheezing or shortness of breath. 75 mL 2 2/18/2025    albuterol 90 mcg/actuation inhaler Inhale 2 puffs every 4 hours if needed for shortness of breath or wheezing. 18 g 5 2/18/2025    azithromycin (Zithromax) 500 mg tablet Take 1 tablet (500 mg) by mouth once daily for 5 days. 5 tablet 0 2/17/2025    budesonide-formoteroL (Symbicort) 160-4.5 mcg/actuation inhaler Inhale 2 puffs 2 times a day. Rinse mouth with water after use to reduce aftertaste and incidence of candidiasis. Do not swallow. (Patient taking differently: Inhale 2 puffs once daily. Rinse mouth with water after use to reduce aftertaste and incidence of candidiasis. Do not swallow.) 10.2 g 11 2/18/2025    lidocaine (Lidoderm) 5 % patch Place 1 patch over 12 hours on the skin once daily. Apply to painful area 12 hours per day, remove for 12 hours. (Patient taking differently: Place 1 patch on the skin once daily as needed for moderate pain  (4 - 6). Apply to painful area 12 hours per day, remove for 12 hours.) 30 patch 1 Past Week    pantoprazole (ProtoNix) 40 mg EC tablet Take 1 tablet (40 mg) by mouth once daily.   2/17/2025 Bedtime    tiZANidine (Zanaflex) 4 mg tablet Take 1 tablet (4 mg) by mouth 3 times a day as needed for muscle spasms. 30 tablet 1 2/17/2025    cyclobenzaprine (Flexeril) 5 mg tablet Take 1 tablet (5 mg) by mouth 3 times a day as needed for muscle spasms. (Patient not taking: Reported on 2/19/2025) 30 tablet 0 Not Taking    EPINEPHrine (Epipen) 0.3 mg/0.3 mL injection syringe Inject 0.3 mL (0.3 mg) into the muscle if needed for anaphylaxis. Call 911 after use. 0.6 mL 1     fluticasone (Flonase) 50 mcg/actuation nasal spray Administer 2 sprays into each nostril as needed at bedtime for rhinitis. Shake gently. Before first use, prime pump. After use, clean tip and replace cap.       ibuprofen 200 mg tablet Take 2 tablets (400 mg) by mouth every 8 hours if needed for headaches.       losartan (Cozaar) 100 mg tablet Take 1 tablet (100 mg) by mouth once daily.       predniSONE (Deltasone) 10 mg tablet Take 6 tablets (60 mg) by mouth once daily for 1 day, THEN 5 tablets (50 mg) once daily for 1 day, THEN 4 tablets (40 mg) once daily for 1 day, THEN 3 tablets (30 mg) once daily for 1 day, THEN 2 tablets (20 mg) once daily for 1 day, THEN 1 tablet (10 mg) once daily for 1 day.           The list below reflectives the updated allergy list. Please review each documented allergy for additional clarification and justification.  Allergies  Reviewed by Keri Parra on 2/19/2025        Severity Reactions Comments    Bee Venom Protein (honey Bee) High Anaphylaxis, Swelling     Penicillin High Hives Reaction: Vomiting    Red Dye High Anaphylaxis     Codeine Medium Itching, GI Upset, Nausea/vomiting     Hydrocodone Medium Itching, GI Upset, Nausea/vomiting     Hydrocodone-acetaminophen Medium Itching, GI Upset, Nausea/vomiting      "Hydrocodone-homatropine Medium Itching, GI Upset, Nausea/vomiting     Ace Inhibitors Low Cough             Below are additional concerns with the patient's PTA list.  Spoke w/pt at bedside. Reviewed PTA and allergy list. Please review changes made to the PTA list in the chart above. Losartan/hydrochlorothiazide present on PTA list at the time of interview but MD only had a fill history for losartan potassium. When questioned about which medication she was taking at home, the pt reported she was taking the combo drug. Upon further questioning and presenting her with the [facility] fill history, pt had her daughter send her a picture of her prescription pill bottle from home and it read \"Losartan Potassium 100 MG\". The combo drug has been removed and plain losartan added at this time. Pt stated the \"potassium\" at the end of the medication confused her.    Keri Parra CPhT  Medication History Pharmacy Technician  M-F 8-4:30  Available via [employer] Secure Chat  OR  [phone]  "

## 2025-02-19 NOTE — DISCHARGE INSTR - OTHER ORDERS
Thank you for choosing [facility] for your Health Care needs.  Also, thank you for allowing us to take you and your families preferences into account when determining your discharge plan.  Stay well!                                    Your Care Transitions Team Member:  Lali Abraham Robin and Juana     For questions about your medications listed on your discharge instructions, please call the Nurses Station at [phone].

## 2025-02-19 NOTE — PROGRESS NOTES
Added reactions to medications and removed duplicate red dye allergy.    Keri Parra CPhT  Medication History Pharmacy Technician  [facility] 8-4:30  Available via [employer] Secure Chat  OR  [phone]

## 2025-02-19 NOTE — PROGRESS NOTES
02/19/25 1116   Discharge Planning   Living Arrangements Friends   Support Systems Children;Friends/neighbors   Assistance Needed Met with patient at bedside. States she lives with friends and family. [employer] [facility] she is independent with ADL's and will occasionally use a cane if needed. [employer] she drives and works- requests a return to work slip upon discharge. Patient is room air baseline, currently on O2.States she has a difficult time affording groceries,  [employer] program flier given to patient.   Type of Residence Private residence   Number of Stairs to Enter Residence 15   Number of Stairs Within Residence 14   Do you have animals or pets at home? Yes   Type of Animals or Pets 4 cats and a rabbit   Who is requesting discharge planning? Provider   Home or Post Acute Services None   Expected Discharge Disposition Home   Does the patient need discharge transport arranged? No   Financial Resource Strain   How hard is it for you to pay for the very basics like food, housing, medical care, and heating? Not hard   Housing Stability   In the last 12 months, was there a time when you were not able to pay the mortgage or rent on time? N   In the past 12 months, how many times have you moved where you were living? 0   At any time in the past 12 months, were you homeless or living in a shelter (including now)? N   Transportation Needs   In the past 12 months, has lack of transportation kept you from medical appointments or from getting medications? no   In the past 12 months, has lack of transportation kept you from meetings, work, or from getting things needed for daily living? No   Stroke Family Assessment   Stroke Family Assessment Needed No   Intensity of Service   Intensity of Service 0-30 min

## 2025-02-20 VITALS
DIASTOLIC BLOOD PRESSURE: 92 MMHG | RESPIRATION RATE: 18 BRPM | HEART RATE: 65 BPM | BODY MASS INDEX: 40.6 KG/M2 | SYSTOLIC BLOOD PRESSURE: 143 MMHG | TEMPERATURE: 97.2 F | WEIGHT: 206.79 LBS | OXYGEN SATURATION: 92 % | HEIGHT: 60 IN

## 2025-02-20 LAB
ANION GAP SERPL CALC-SCNC: 14 MMOL/L (ref 10–20)
BUN SERPL-MCNC: 17 MG/DL (ref 6–23)
CALCIUM SERPL-MCNC: 9.8 MG/DL (ref 8.6–10.3)
CHLORIDE SERPL-SCNC: 101 MMOL/L (ref 98–107)
CO2 SERPL-SCNC: 25 MMOL/L (ref 21–32)
CREAT SERPL-MCNC: 0.64 MG/DL (ref 0.5–1.05)
EGFRCR SERPLBLD CKD-EPI 2021: >90 ML/MIN/1.73M*2
ERYTHROCYTE [DISTWIDTH] IN BLOOD BY AUTOMATED COUNT: 13.2 % (ref 11.5–14.5)
GLUCOSE SERPL-MCNC: 123 MG/DL (ref 74–99)
HCT VFR BLD AUTO: 48.8 % (ref 36–46)
HGB BLD-MCNC: 16.4 G/DL (ref 12–16)
HOLD SPECIMEN: NORMAL
MCH RBC QN AUTO: 31.3 PG (ref 26–34)
MCHC RBC AUTO-ENTMCNC: 33.6 G/DL (ref 32–36)
MCV RBC AUTO: 93 FL (ref 80–100)
NRBC BLD-RTO: 0 /100 WBCS (ref 0–0)
PLATELET # BLD AUTO: 272 X10*3/UL (ref 150–450)
POTASSIUM SERPL-SCNC: 4.2 MMOL/L (ref 3.5–5.3)
RBC # BLD AUTO: 5.24 X10*6/UL (ref 4–5.2)
SODIUM SERPL-SCNC: 136 MMOL/L (ref 136–145)
WBC # BLD AUTO: 12.7 X10*3/UL (ref 4.4–11.3)

## 2025-02-20 PROCEDURE — 2500000005 HC RX 250 GENERAL PHARMACY W/O HCPCS: Mod: SE,IPSPLIT | Performed by: NURSE PRACTITIONER

## 2025-02-20 PROCEDURE — 36415 COLL VENOUS BLD VENIPUNCTURE: CPT | Mod: IPSPLIT | Performed by: NURSE PRACTITIONER

## 2025-02-20 PROCEDURE — 85027 COMPLETE CBC AUTOMATED: CPT | Mod: IPSPLIT | Performed by: NURSE PRACTITIONER

## 2025-02-20 PROCEDURE — 94760 N-INVAS EAR/PLS OXIMETRY 1: CPT | Mod: IPSPLIT

## 2025-02-20 PROCEDURE — 2500000002 HC RX 250 W HCPCS SELF ADMINISTERED DRUGS (ALT 637 FOR MEDICARE OP, ALT 636 FOR OP/ED): Mod: SE,IPSPLIT | Performed by: NURSE PRACTITIONER

## 2025-02-20 PROCEDURE — 80048 BASIC METABOLIC PNL TOTAL CA: CPT | Mod: IPSPLIT | Performed by: NURSE PRACTITIONER

## 2025-02-20 PROCEDURE — 2500000002 HC RX 250 W HCPCS SELF ADMINISTERED DRUGS (ALT 637 FOR MEDICARE OP, ALT 636 FOR OP/ED): Mod: SE,IPSPLIT | Performed by: STUDENT IN AN ORGANIZED HEALTH CARE EDUCATION/TRAINING PROGRAM

## 2025-02-20 PROCEDURE — 2500000004 HC RX 250 GENERAL PHARMACY W/ HCPCS (ALT 636 FOR OP/ED): Mod: JZ,SE,IPSPLIT | Performed by: NURSE PRACTITIONER

## 2025-02-20 PROCEDURE — 94640 AIRWAY INHALATION TREATMENT: CPT | Mod: IPSPLIT

## 2025-02-20 PROCEDURE — 2500000001 HC RX 250 WO HCPCS SELF ADMINISTERED DRUGS (ALT 637 FOR MEDICARE OP): Mod: SE,IPSPLIT | Performed by: NURSE PRACTITIONER

## 2025-02-20 PROCEDURE — 2500000002 HC RX 250 W HCPCS SELF ADMINISTERED DRUGS (ALT 637 FOR MEDICARE OP, ALT 636 FOR OP/ED): Mod: SE,IPSPLIT

## 2025-02-20 RX ORDER — IPRATROPIUM BROMIDE AND ALBUTEROL SULFATE 2.5; .5 MG/3ML; MG/3ML
3 SOLUTION RESPIRATORY (INHALATION) 4 TIMES DAILY PRN
Qty: 360 ML | Refills: 11 | Status: SHIPPED | OUTPATIENT
Start: 2025-02-20

## 2025-02-20 RX ORDER — OSELTAMIVIR PHOSPHATE 75 MG/1
75 CAPSULE ORAL 2 TIMES DAILY
Qty: 5 CAPSULE | Refills: 0 | Status: SHIPPED | OUTPATIENT
Start: 2025-02-20 | End: 2025-02-23

## 2025-02-20 RX ORDER — METHYLPREDNISOLONE 4 MG/1
TABLET ORAL
Qty: 21 TABLET | Refills: 0 | Status: SHIPPED | OUTPATIENT
Start: 2025-02-20 | End: 2025-02-26

## 2025-02-20 RX ORDER — CEFUROXIME AXETIL 500 MG/1
500 TABLET ORAL 2 TIMES DAILY
Qty: 12 TABLET | Refills: 0 | Status: SHIPPED | OUTPATIENT
Start: 2025-02-20 | End: 2025-02-26

## 2025-02-20 RX ORDER — ALBUTEROL SULFATE 90 UG/1
2 INHALANT RESPIRATORY (INHALATION) EVERY 4 HOURS PRN
Qty: 18 G | Refills: 5 | Status: SHIPPED | OUTPATIENT
Start: 2025-02-20

## 2025-02-20 RX ADMIN — Medication 21 PERCENT: at 10:01

## 2025-02-20 RX ADMIN — IBUPROFEN 800 MG: 400 TABLET, FILM COATED ORAL at 08:24

## 2025-02-20 RX ADMIN — AZITHROMYCIN DIHYDRATE 500 MG: 250 TABLET, FILM COATED ORAL at 08:24

## 2025-02-20 RX ADMIN — IPRATROPIUM BROMIDE AND ALBUTEROL SULFATE 3 ML: .5; 3 SOLUTION RESPIRATORY (INHALATION) at 09:59

## 2025-02-20 RX ADMIN — PANTOPRAZOLE SODIUM 40 MG: 40 TABLET, DELAYED RELEASE ORAL at 06:09

## 2025-02-20 RX ADMIN — HYDROCHLOROTHIAZIDE 25 MG: 25 TABLET ORAL at 08:24

## 2025-02-20 RX ADMIN — ACETAMINOPHEN 650 MG: 325 TABLET, FILM COATED ORAL at 09:33

## 2025-02-20 RX ADMIN — METHYLPREDNISOLONE SODIUM SUCCINATE 40 MG: 40 INJECTION, POWDER, FOR SOLUTION INTRAMUSCULAR; INTRAVENOUS at 05:12

## 2025-02-20 RX ADMIN — OSELTAMIVIR 75 MG: 75 CAPSULE ORAL at 08:24

## 2025-02-20 RX ADMIN — LOSARTAN POTASSIUM 100 MG: 50 TABLET, FILM COATED ORAL at 08:24

## 2025-02-20 RX ADMIN — FLUTICASONE FUROATE AND VILANTEROL TRIFENATATE 1 PUFF: 200; 25 POWDER RESPIRATORY (INHALATION) at 10:00

## 2025-02-20 RX ADMIN — LIDOCAINE 1 PATCH: 560 PATCH PERCUTANEOUS; TOPICAL; TRANSDERMAL at 08:24

## 2025-02-20 ASSESSMENT — COGNITIVE AND FUNCTIONAL STATUS - GENERAL
DAILY ACTIVITIY SCORE: 24
MOBILITY SCORE: 24

## 2025-02-20 ASSESSMENT — PAIN SCALES - GENERAL
PAINLEVEL_OUTOF10: 4
PAINLEVEL_OUTOF10: 3

## 2025-02-20 ASSESSMENT — PAIN DESCRIPTION - LOCATION: LOCATION: HEAD

## 2025-02-20 NOTE — PROGRESS NOTES
02/20/25 1133   Discharge Planning   Assistance Needed Patient is medically ready for discharge. 211 given for resouce for grab bar inquiry. DC Home no needs.   Expected Discharge Disposition Home   Does the patient need discharge transport arranged? No   Intensity of Service   Intensity of Service 0-30 min

## 2025-02-20 NOTE — DISCHARGE SUMMARY
Discharge Diagnosis  Influenza A    Issues Requiring Follow-Up  Follow up with PCP     Discharge Meds     Medication List      START taking these medications     cefuroxime 500 mg tablet; Commonly known as: Ceftin; Take 1 tablet (500   mg) by mouth 2 times a day for 6 days.   ipratropium-albuteroL 0.5-2.5 mg/3 mL nebulizer solution; Commonly known   as: Duo-Neb; Take 3 mL by nebulization 4 times a day as needed for   wheezing or shortness of breath.   methylPREDNISolone 4 mg tablets; Commonly known as: Medrol Dospak;   Follow schedule on package instructions   oseltamivir 75 mg capsule; Commonly known as: Tamiflu; Take 1 capsule   (75 mg) by mouth 2 times a day for 5 doses.     CHANGE how you take these medications     albuterol 90 mcg/actuation inhaler; Inhale 2 puffs every 4 hours if   needed for shortness of breath or wheezing.; What changed: Another   medication with the same name was removed. Continue taking this   medication, and follow the directions you see here.     CONTINUE taking these medications     benzonatate 100 mg capsule; Commonly known as: Tessalon   budesonide-formoteroL 160-4.5 mcg/actuation inhaler; Commonly known as:   Symbicort; Inhale 2 puffs 2 times a day. Rinse mouth with water after use   to reduce aftertaste and incidence of candidiasis. Do not swallow.   EPINEPHrine 0.3 mg/0.3 mL injection syringe; Commonly known as: Epipen;   Inject 0.3 mL (0.3 mg) into the muscle if needed for anaphylaxis. Call 911   after use.   fluticasone 50 mcg/actuation nasal spray; Commonly known as: Flonase   ibuprofen 200 mg tablet   lidocaine 5 % patch; Commonly known as: Lidoderm; Place 1 patch over 12   hours on the skin once daily. Apply to painful area 12 hours per day,   remove for 12 hours.   losartan 100 mg tablet; Commonly known as: Cozaar   pantoprazole 40 mg EC tablet; Commonly known as: ProtoNix   tiZANidine 4 mg tablet; Commonly known as: Zanaflex; Take 1 tablet (4   mg) by mouth 3 times a day as  needed for muscle spasms.     STOP taking these medications     azithromycin 500 mg tablet; Commonly known as: Zithromax   cyclobenzaprine 5 mg tablet; Commonly known as: Flexeril   predniSONE 10 mg tablet; Commonly known as: Deltasone       Test Results Pending At Discharge  Pending Labs       No current pending labs.            Hospital Course   Sinai Mcbride is a 59 y.o. female presenting with shortness of breath. Pt states she has been sick since Jan with bronchitis. She has been on a few rounds of antibiotics and steroids. She said her roommate came home with the flu last Tuesday. She saw her PCP on Friday and was started on azithro, prednisone, and symbicort. She came to the ED with increased wheezing. Workup in the ED revealed influenza A. Pt was admitted to med surg.      ED VS: T36.7, HR 63, RR 22, /97, Sp02 89%RA     Imaging: CXR- 1. No evidence of acute cardiopulmonary process.      Labs: Glu 99, Na 137, K 3.4, Bun/creat 8/0.71, Mg 1.97, WBC 7.3, H/H 15.1/45.9, Plt 222, Flu A+    Hospital Course:  #Influenza A  #Asthma exacerbation  -CXR: 1. No evidence of acute cardiopulmonary process.   -WBC 7.3   -Influenza A +  -Influenza B/RSV/Covid neg   -PO azithro, ceftriaxone (Day 2)  -Solumedrol q8  -Tamiflu x5 days   -Bronchodilators   -Supplemental oxygen to maintain an sp02 greater than 92%  -Currently on 3L  -Baseline RA  -Isolation protocol     #Essential HTN  -Continue hydrochlorothiazide, losartan  -Monitor BP and HR     DVT ppx  -lovenox     PUD ppx  -pantoprazole     F: PRN  E: Replete per protocol  N: Regular  A: PIV     Disposition: Pt stable for discharge home on oral abx and steroids. Stable on RA. Sent rx for duonebs and inhalers. Follow up with PCP     Code Status: Full Code     Total cumulative time spent in preparation of this discharge including documentation review, coordination of care with the medical team including PT/SW/care coordinators and treating consultants, discussion with  patient and pertinent family members and finalization of prescriptions, followup appointments and this discharge summary was approximately  45 minutes. Over 50% of the time was spent face-to-face counseling the patient on diagnosis, prescriptions, and follow up appointments.     Pertinent Physical Exam At Time of Discharge  Physical Exam  Vitals reviewed.   Constitutional:       Appearance: She is obese.   HENT:      Head: Normocephalic and atraumatic.      Right Ear: External ear normal.      Left Ear: External ear normal.      Nose: Nose normal.      Mouth/Throat:      Pharynx: Oropharynx is clear.   Eyes:      Conjunctiva/sclera: Conjunctivae normal.   Cardiovascular:      Rate and Rhythm: Normal rate and regular rhythm.      Pulses: Normal pulses.      Heart sounds: Normal heart sounds.   Pulmonary:      Breath sounds: Wheezing present.   Abdominal:      General: Bowel sounds are normal.      Palpations: Abdomen is soft.   Musculoskeletal:         General: Normal range of motion.      Cervical back: Normal range of motion and neck supple.   Skin:     General: Skin is dry.   Neurological:      General: No focal deficit present.      Mental Status: She is alert and oriented to person, place, and time.   Psychiatric:         Mood and Affect: Mood normal.         Behavior: Behavior normal.     Outpatient Follow-Up  Future Appointments   Date Time Provider Department Center   3/17/2025  8:30 AM José Manuel Gerard MD [id number]

## 2025-02-20 NOTE — NURSING NOTE
Pt verbalizes understanding of dispo instructions, including medications and side effects as well as follow up care. Pt stable at time of dispo. Vitals WNL, resps even and regular on room air, skin p/w/d. A&Ox4. Home with family. Pt declines wheelchair to exit.

## 2025-02-20 NOTE — CARE PLAN
The patient's goals for the shift include      The clinical goals for the shift include patient will be free from any falls or injury this shift    Over the shift, the patient did make progress toward the following goals.   Problem: Pain - Adult  Goal: Verbalizes/displays adequate comfort level or baseline comfort level  Outcome: Progressing     Problem: Safety - Adult  Goal: Free from fall injury  Outcome: Progressing     Problem: Fall/Injury  Goal: Not fall by end of shift  Outcome: Progressing

## 2025-02-24 ENCOUNTER — PATIENT OUTREACH (OUTPATIENT)
Dept: PRIMARY CARE | Facility: CLINIC | Age: 60
End: 2025-02-24
Payer: COMMERCIAL

## 2025-02-24 DIAGNOSIS — Z09 HOSPITAL DISCHARGE FOLLOW-UP: ICD-10-CM

## 2025-02-24 DIAGNOSIS — J10.1 INFLUENZA A: ICD-10-CM

## 2025-02-24 NOTE — PROGRESS NOTES
TCM completed 02/21/25   Discharge Facility: [facility]  Discharge Diagnosis: Influenza A, Asthma exacerbation   Admission Date: 2/18/25  Discharge Date: 2/20/25    PCP Appointment Date: No appts scheduled  Specialist Appointment Date: Nutrition- 2/26/25,  Pulmonology- 3/17/25    Hospital Encounter and Summary Linked: Yes  ED to Hosp-Admission (Discharged) with Navdeep Jackman MD; Lexie SARGENT MD (02/18/2025)                         Unable to reach patient; Two attempts were made to reach patient within two business days after discharge. Voicemail left with contact information for patient to call back with any non-emergent questions or concerns. No return call as of this note.  Needs seen by: 3/7/25.

## 2025-02-26 ENCOUNTER — CLINICAL SUPPORT (OUTPATIENT)
Dept: NUTRITION | Facility: HOSPITAL | Age: 60
End: 2025-02-26
Payer: COMMERCIAL

## 2025-02-26 DIAGNOSIS — J45.21 EXACERBATION OF INTERMITTENT ASTHMA, UNSPECIFIED ASTHMA SEVERITY (HHS-HCC): ICD-10-CM

## 2025-02-26 LAB
ATRIAL RATE: 54 BPM
P AXIS: 53 DEGREES
P OFFSET: 205 MS
P ONSET: 154 MS
PR INTERVAL: 146 MS
Q ONSET: 227 MS
QRS COUNT: 9 BEATS
QRS DURATION: 80 MS
QT INTERVAL: 422 MS
QTC CALCULATION(BAZETT): 400 MS
QTC FREDERICIA: 407 MS
R AXIS: -23 DEGREES
T AXIS: -20 DEGREES
T OFFSET: 438 MS
VENTRICULAR RATE: 54 BPM

## 2025-02-26 NOTE — PROGRESS NOTES
Food For Life  Diet Recommendation 1: MyPlate  Diet Recommendation 2: Healthy Eating  Other Diet Recommendations: Low saturated fat, low added sugar  Food Intolerance Avoidance: NKFA  Household Size: 5 Members (4 Max/Household)  Interventions: Referral Number: 1st 6 Mo Referral 6 Mos  Interventions: Visit Number: 1 of 6 Visits - Max 6 Visits/Referral Each 6 Mo Period  Other Interventions: [facility] Food Resources, Outpatient Nutrition Handout  Education Today: Food Labels (Low Saturated Fat, Decreased added sugar)  Follow Up Notes for Future Visits: Pt wants to schedule OP nutrition appt for liver nutrition  Grains: 25-50% Whole  Fruit: Fresh - 100%  Vegetables: Fresh - 100%  Proteins: 1-2 Plant-based Items  Dairy: 25-50% Lowfat  Originating Site of Referral Order: Jayda Victors of RD Assisting Today: MAXWELL

## 2025-02-28 LAB
ATRIAL RATE: 55 BPM
P AXIS: 61 DEGREES
P OFFSET: 213 MS
P ONSET: 162 MS
PR INTERVAL: 126 MS
Q ONSET: 225 MS
QRS COUNT: 8 BEATS
QRS DURATION: 94 MS
QT INTERVAL: 428 MS
QTC CALCULATION(BAZETT): 409 MS
QTC FREDERICIA: 415 MS
R AXIS: -18 DEGREES
T AXIS: 9 DEGREES
T OFFSET: 439 MS
VENTRICULAR RATE: 55 BPM

## 2025-03-06 ENCOUNTER — TELEPHONE (OUTPATIENT)
Dept: PRIMARY CARE | Facility: CLINIC | Age: 60
End: 2025-03-06

## 2025-03-06 DIAGNOSIS — Z71.3 NUTRITIONAL COUNSELING: ICD-10-CM

## 2025-03-06 NOTE — TELEPHONE ENCOUNTER
Patient requesting a referral to dietician to rule out a possible cause with diet.  Add Dx and sign

## 2025-03-17 ENCOUNTER — APPOINTMENT (OUTPATIENT)
Dept: PULMONOLOGY | Facility: CLINIC | Age: 60
End: 2025-03-17
Payer: COMMERCIAL

## 2025-03-17 VITALS
WEIGHT: 208.4 LBS | BODY MASS INDEX: 40.7 KG/M2 | DIASTOLIC BLOOD PRESSURE: 86 MMHG | HEART RATE: 51 BPM | OXYGEN SATURATION: 96 % | SYSTOLIC BLOOD PRESSURE: 143 MMHG

## 2025-03-17 DIAGNOSIS — J45.901 EXACERBATION OF ASTHMA, UNSPECIFIED ASTHMA SEVERITY, UNSPECIFIED WHETHER PERSISTENT (HHS-HCC): ICD-10-CM

## 2025-03-17 DIAGNOSIS — J10.1 INFLUENZA A: ICD-10-CM

## 2025-03-17 DIAGNOSIS — J45.909 ASTHMA, UNSPECIFIED ASTHMA SEVERITY, UNSPECIFIED WHETHER COMPLICATED, UNSPECIFIED WHETHER PERSISTENT (HHS-HCC): Primary | ICD-10-CM

## 2025-03-17 DIAGNOSIS — J30.9 ALLERGIC RHINITIS, UNSPECIFIED SEASONALITY, UNSPECIFIED TRIGGER: ICD-10-CM

## 2025-03-17 PROCEDURE — 3077F SYST BP >= 140 MM HG: CPT | Performed by: PEDIATRICS

## 2025-03-17 PROCEDURE — 99245 OFF/OP CONSLTJ NEW/EST HI 55: CPT | Performed by: PEDIATRICS

## 2025-03-17 PROCEDURE — 3079F DIAST BP 80-89 MM HG: CPT | Performed by: PEDIATRICS

## 2025-03-17 PROCEDURE — 1036F TOBACCO NON-USER: CPT | Performed by: PEDIATRICS

## 2025-03-17 RX ORDER — ALBUTEROL SULFATE 0.83 MG/ML
3 SOLUTION RESPIRATORY (INHALATION) ONCE
OUTPATIENT
Start: 2025-03-17 | End: 2025-03-17

## 2025-03-17 RX ORDER — ALBUTEROL SULFATE 90 UG/1
1 INHALANT RESPIRATORY (INHALATION) ONCE
OUTPATIENT
Start: 2025-03-17

## 2025-03-17 RX ORDER — PREDNISONE 10 MG/1
TABLET ORAL
Qty: 38 TABLET | Refills: 0 | Status: SHIPPED | OUTPATIENT
Start: 2025-03-17

## 2025-03-17 ASSESSMENT — PATIENT HEALTH QUESTIONNAIRE - PHQ9
SUM OF ALL RESPONSES TO PHQ9 QUESTIONS 1 AND 2: 1
2. FEELING DOWN, DEPRESSED OR HOPELESS: SEVERAL DAYS
10. IF YOU CHECKED OFF ANY PROBLEMS, HOW DIFFICULT HAVE THESE PROBLEMS MADE IT FOR YOU TO DO YOUR WORK, TAKE CARE OF THINGS AT HOME, OR GET ALONG WITH OTHER PEOPLE: SOMEWHAT DIFFICULT
1. LITTLE INTEREST OR PLEASURE IN DOING THINGS: NOT AT ALL

## 2025-03-17 NOTE — PROGRESS NOTES
Subjective   Patient ID: Sinai Mcbride is a 60 y.o. female who presents for asthma    HPI    Mrs Mcbride is a 60yr old diagnosed with asthma during her second pregnancy.  She has been  on symbicort with albuterol as needed, which worked pretty well.  This year she has been worse with increased wheezing and more tirggers than in the past.  She recently was admitted with influenza and for the most part has recovered but is still has more wheezing.    I reviewed a CXR from 2/18/2025 which is normal, she had a CT chest 2/7/2025 that showed some mosaic changes.    She is a former smoker 1/2ppd age [age]    Review of Systems    See scanned documents attached to this note for review of systems, and appropriate scales/scores for this visit    Objective   Physical Exam  Constitutional:       Appearance: Normal appearance.   HENT:      Head: Normocephalic and atraumatic.      Mouth/Throat:      Pharynx: Oropharynx is clear.   Cardiovascular:      Rate and Rhythm: Normal rate and regular rhythm.      Pulses: Normal pulses.      Heart sounds: Normal heart sounds.   Pulmonary:      Effort: Pulmonary effort is normal.      Breath sounds: Wheezing present. No rhonchi or rales.   Abdominal:      General: Bowel sounds are normal.      Palpations: Abdomen is soft.   Musculoskeletal:         General: Normal range of motion.   Skin:     General: Skin is warm and dry.   Neurological:      General: No focal deficit present.      Mental Status: She is alert and oriented to person, place, and time.   Psychiatric:         Mood and Affect: Mood normal.       Assessment/Plan     60yr old with asthma, worse this year and recent influenza    Asthma:  not as well controlled as in the past  - there are several people with asthma that have had a much worse time this past year.    - will get PFT but wait 1 month to get back to baseline  -  prednisone with taper (wheezing today)   - continue symbicort and albuterol as needed    2. Influenza:  admitted  for 1 day.   - recovering was needing duoneb, now just occasional albuterol but still wheezing on exam today  - prednisone with taper  Continue symbicort    Follow up 6 weeks after PFT      José Manuel Gerard MD 03/17/25 8:00 AM

## 2025-03-17 NOTE — LETTER
March 17, 2025     Clement Zarate DO  [address] [facility] [id number]    Patient: Sinai Mcbride   YOB: 1965   Date of Visit: 3/17/2025       Dear Dr. Clement Zarate DO:    Thank you for referring Sinai Mcbride to me for evaluation. Below are my notes for this consultation.  If you have questions, please do not hesitate to call me. I look forward to following your patient along with you.       Sincerely,     José Manuel Gerard MD      CC: No Recipients  ______________________________________________________________________________________    Subjective  Patient ID: Sinai Mcbride is a 60 y.o. female who presents for asthma    HPI    Mrs Mcbride is a 60yr old diagnosed with asthma during her second pregnancy.  She has been  on symbicort with albuterol as needed, which worked pretty well.  This year she has been worse with increased wheezing and more tirggers than in the past.  She recently was admitted with influenza and for the most part has recovered but is still has more wheezing.    I reviewed a CXR from 2/18/2025 which is normal, she had a CT chest 2/7/2025 that showed some mosaic changes.    She is a former smoker 1/2ppd age [age]    Review of Systems    See scanned documents attached to this note for review of systems, and appropriate scales/scores for this visit    Objective  Physical Exam  Constitutional:       Appearance: Normal appearance.   HENT:      Head: Normocephalic and atraumatic.      Mouth/Throat:      Pharynx: Oropharynx is clear.   Cardiovascular:      Rate and Rhythm: Normal rate and regular rhythm.      Pulses: Normal pulses.      Heart sounds: Normal heart sounds.   Pulmonary:      Effort: Pulmonary effort is normal.      Breath sounds: Wheezing present. No rhonchi or rales.   Abdominal:      General: Bowel sounds are normal.      Palpations: Abdomen is soft.   Musculoskeletal:         General: Normal range of motion.   Skin:     General: Skin is warm and dry.   Neurological:       General: No focal deficit present.      Mental Status: She is alert and oriented to person, place, and time.   Psychiatric:         Mood and Affect: Mood normal.       Assessment/Plan    60yr old with asthma, worse this year and recent influenza    Asthma:  not as well controlled as in the past  - there are several people with asthma that have had a much worse time this past year.    - will get PFT but wait 1 month to get back to baseline  -  prednisone with taper (wheezing today)   - continue symbicort and albuterol as needed    2. Influenza:  admitted for 1 day.   - recovering was needing duoneb, now just occasional albuterol but still wheezing on exam today  - prednisone with taper  Continue symbicort    Follow up 6 weeks after PFT      José Manuel Gerard MD 03/17/25 8:00 AM

## 2025-03-26 ENCOUNTER — CLINICAL SUPPORT (OUTPATIENT)
Dept: NUTRITION | Facility: HOSPITAL | Age: 60
End: 2025-03-26
Payer: COMMERCIAL

## 2025-03-26 NOTE — PROGRESS NOTES
Food For Life  Diet Recommendation 1: MyPlate  Diet Recommendation 2: Healthy Eating  Other Diet Recommendations: Low saturated fat, low added sugar  Food Intolerance Avoidance: NKFA  Nutrition Goals Stated: Keep food log to help identify if certain foods are causing abdominal pain  Household Size: 5 Members (4 Max/Household)  Interventions: Referral Number: 1st 6 Mo Referral 6 Mos  Interventions: Visit Number: 2 of 6 Visits - Max 6 Visits/Referral Each 6 Mo Period  Education Today: MyPlate Meals (Food Log, Added sugar, Fiber)  Follow Up Notes for Future Visits: Pt wants to schedule OP nutrition appt for liver nutrition  Grains: 25-50% Whole  Fruit: Fresh - 100%  Vegetables: Fresh - 100%  Proteins: 1-2 Plant-based Items  Dairy: 25-50% Lowfat  Originating Site of Referral Order: Jayda Victors of RD Assisting Today: MAXWELL

## 2025-04-01 ENCOUNTER — HOSPITAL ENCOUNTER (OUTPATIENT)
Dept: RESPIRATORY THERAPY | Facility: HOSPITAL | Age: 60
Discharge: HOME | End: 2025-04-01
Payer: COMMERCIAL

## 2025-04-01 DIAGNOSIS — J45.909 ASTHMA, UNSPECIFIED ASTHMA SEVERITY, UNSPECIFIED WHETHER COMPLICATED, UNSPECIFIED WHETHER PERSISTENT (HHS-HCC): ICD-10-CM

## 2025-04-01 PROCEDURE — 94726 PLETHYSMOGRAPHY LUNG VOLUMES: CPT

## 2025-04-01 PROCEDURE — 94060 EVALUATION OF WHEEZING: CPT

## 2025-04-01 PROCEDURE — 94640 AIRWAY INHALATION TREATMENT: CPT | Mod: 59

## 2025-04-01 PROCEDURE — 94729 DIFFUSING CAPACITY: CPT

## 2025-04-01 PROCEDURE — 94060 EVALUATION OF WHEEZING: CPT | Performed by: PEDIATRICS

## 2025-04-01 PROCEDURE — 94729 DIFFUSING CAPACITY: CPT | Performed by: PEDIATRICS

## 2025-04-01 PROCEDURE — 94726 PLETHYSMOGRAPHY LUNG VOLUMES: CPT | Performed by: PEDIATRICS

## 2025-04-01 PROCEDURE — 94664 DEMO&/EVAL PT USE INHALER: CPT | Mod: 59

## 2025-04-01 PROCEDURE — 2500000002 HC RX 250 W HCPCS SELF ADMINISTERED DRUGS (ALT 637 FOR MEDICARE OP, ALT 636 FOR OP/ED): Mod: SE | Performed by: PEDIATRICS

## 2025-04-01 PROCEDURE — 94760 N-INVAS EAR/PLS OXIMETRY 1: CPT

## 2025-04-01 RX ORDER — ALBUTEROL SULFATE 0.83 MG/ML
3 SOLUTION RESPIRATORY (INHALATION) ONCE
Status: COMPLETED | OUTPATIENT
Start: 2025-04-01 | End: 2025-04-01

## 2025-04-01 RX ORDER — ALBUTEROL SULFATE 90 UG/1
1 INHALANT RESPIRATORY (INHALATION) ONCE
Status: COMPLETED | OUTPATIENT
Start: 2025-04-01 | End: 2025-04-01

## 2025-04-01 RX ADMIN — ALBUTEROL SULFATE 3 ML: 2.5 SOLUTION RESPIRATORY (INHALATION) at 10:35

## 2025-04-02 LAB
MGC ASCENT PFT - FEV1 - POST: 2.31
MGC ASCENT PFT - FEV1 - PRE: 1.94
MGC ASCENT PFT - FEV1 - PREDICTED: 2.06
MGC ASCENT PFT - FVC - POST: 3.11
MGC ASCENT PFT - FVC - PRE: 2.68
MGC ASCENT PFT - FVC - PREDICTED: 2.54

## 2025-04-23 ENCOUNTER — CLINICAL SUPPORT (OUTPATIENT)
Dept: NUTRITION | Facility: HOSPITAL | Age: 60
End: 2025-04-23
Payer: COMMERCIAL

## 2025-04-23 NOTE — PROGRESS NOTES
Food For Life  Diet Recommendation 1: MyPlate  Diet Recommendation 2: Healthy Eating  Other Diet Recommendations: Low saturated fat, low added sugar  Food Intolerance Avoidance: NKFA  Nutrition Goals Stated: Reduce fried/fatty foods and added sugars.  Household Size: 3 Family Members  Interventions: Referral Number: 1st 6 Mo Referral 6 Mos  Interventions: Visit Number: 3 of 6 Visits - Max 6 Visits/Referral Each 6 Mo Period  Other Interventions: Recommended pt speak with pulmonologist at appointment this month on pains happening in the morning.  Education Today: Food Labels (Low Fat, Sodium)  Grains: % Whole  Fruit: % Fresh  Vegetables: Fresh - 100%  Proteins: 1-2 Plant-based Items  Dairy: 25-50% Lowfat  Originating Site of Referral Order: Jayda Victors of RD Assisting Today: MAXWELL

## 2025-04-28 ENCOUNTER — APPOINTMENT (OUTPATIENT)
Dept: PULMONOLOGY | Facility: CLINIC | Age: 60
End: 2025-04-28
Payer: COMMERCIAL

## 2025-04-28 VITALS
HEART RATE: 63 BPM | BODY MASS INDEX: 39.45 KG/M2 | WEIGHT: 202 LBS | DIASTOLIC BLOOD PRESSURE: 104 MMHG | SYSTOLIC BLOOD PRESSURE: 162 MMHG | OXYGEN SATURATION: 91 %

## 2025-04-28 DIAGNOSIS — J45.901 EXACERBATION OF ASTHMA, UNSPECIFIED ASTHMA SEVERITY, UNSPECIFIED WHETHER PERSISTENT (HHS-HCC): Primary | ICD-10-CM

## 2025-04-28 DIAGNOSIS — G47.9 SLEEP DISORDER: ICD-10-CM

## 2025-04-28 DIAGNOSIS — J30.9 ALLERGIC RHINITIS, UNSPECIFIED SEASONALITY, UNSPECIFIED TRIGGER: ICD-10-CM

## 2025-04-28 PROCEDURE — 3077F SYST BP >= 140 MM HG: CPT | Performed by: PEDIATRICS

## 2025-04-28 PROCEDURE — 1036F TOBACCO NON-USER: CPT | Performed by: PEDIATRICS

## 2025-04-28 PROCEDURE — 99215 OFFICE O/P EST HI 40 MIN: CPT | Performed by: PEDIATRICS

## 2025-04-28 PROCEDURE — 3080F DIAST BP >= 90 MM HG: CPT | Performed by: PEDIATRICS

## 2025-04-28 RX ORDER — PREDNISONE 10 MG/1
TABLET ORAL
Qty: 38 TABLET | Refills: 0 | Status: SHIPPED | OUTPATIENT
Start: 2025-04-28

## 2025-04-28 NOTE — PROGRESS NOTES
Subjective   Patient ID: Sinai Mcbride is a 60 y.o. female who presents for Follow-up (Fuv for PFT and has had pneumonia. Pt concerned about her  ALPHA1 because of her cousins . No marker was listed .).      HPI    4/28/2025:  Ms Mcbride is complaining of increased shortness of breath and wheezing.  She is using symbicort and albuterol.  She is very emotional today stating something is wrong and no one can figure it out.    She asked me to get an ultrasound of her liver because it is enlarged, I explained I do not manage the liver, she then said she saw a hepatologist that told her nothing is wrong, she is not happy with that answer.  She then asked me to do something about her pain.  I told I do not do that either and she should see her pain specialist.  She was not happy with that answer either.  She was told about alpha-1 antitrypsin and she feels she has all the symptoms of that and apparently she has family members with that.  I told her her CT does not have the appearance of alpha-1 AT but we can do some testing.    She is now concerned about her sleep ([place] score today was 13.)    She did a PFT that shows small airways obstruction with bronchodilator response. Otherwise normal.    Initial visit 3/17/2025:  Mrs Mcbride is a 60yr old diagnosed with asthma during her second pregnancy.  She has been  on symbicort with albuterol as needed, which worked pretty well.  This year she has been worse with increased wheezing and more tirggers than in the past.  She recently was admitted with influenza and for the most part has recovered but is still has more wheezing.    I reviewed a CXR from 2/18/2025 which is normal, she had a CT chest 2/7/2025 that showed some mosaic changes.     She is a former smoker 1/2ppd age [age]    Review of Systems    See scanned documents attached to this note for review of systems, and appropriate scales/scores for this visit       Objective   Physical Exam  Constitutional:       Appearance:  Normal appearance.   HENT:      Head: Normocephalic and atraumatic.      Mouth/Throat:      Pharynx: Oropharynx is clear.   Cardiovascular:      Rate and Rhythm: Normal rate and regular rhythm.      Pulses: Normal pulses.      Heart sounds: Normal heart sounds.   Pulmonary:      Effort: Pulmonary effort is normal.      Breath sounds: Wheezing present. No rhonchi or rales.      Comments: Forced exhalation through closed vocal cords (Fake wheeze) but there is actual wheezing present as well.  Abdominal:      General: Bowel sounds are normal.      Palpations: Abdomen is soft.   Musculoskeletal:         General: Normal range of motion.   Skin:     General: Skin is warm and dry.   Neurological:      General: No focal deficit present.      Mental Status: She is alert and oriented to person, place, and time.   Psychiatric:         Mood and Affect: Mood normal.       Assessment/Plan        60yr old with asthma, worse this year.       Asthma:  not as well controlled as in the past  - there are several people with asthma that have had a much worse time this past year.    - will get PFT but wait 1 month to get back to baseline  -  prednisone with taper (wheezing today)   - continue symbicort and albuterol as needed  4/28/2025: given increased symptoms I ordered prednisone with a taper.  She became very upset with that stating she can't live on prednisone.  I also escalated her inhaler to trelegy to see if that helps.  I discussed that the PFT is essentially normal but we can do some blood work to see if there is another treatment that might help.  I ordered IgE, CBC diff, alpha - 1 phenotype and level.    2. ?sleep disorder:  - ordered PSG today    Overall she was not happy with the visit, she is sure something is wrong and she is going to die from it.  I told her I am doing the best I can to get at her pulmonary issues.      Follow up after tests       José Manuel Gerard MD 04/28/25 4:22 PM

## 2025-05-07 ENCOUNTER — TELEPHONE (OUTPATIENT)
Dept: PULMONOLOGY | Facility: CLINIC | Age: 60
End: 2025-05-07
Payer: COMMERCIAL

## 2025-05-07 NOTE — TELEPHONE ENCOUNTER
Pt came in to office and said that she cancelled the ss she said that she thinks she doesn't have sleep apnea she said that if you think she needs to do the test she will schedule it. Prednisone is working and she said that is only working about 50% she said that she has a couple of days left she said that yesterday she was back doing her neb txs and is using the chamber with her Symbicort she said that she did have her blood work done this morning.    Thank you!

## 2025-05-08 LAB
A1AT PHENOTYP SERPL-IMP: NORMAL
A1AT SERPL-MCNC: 118 MG/DL (ref 83–199)
BASOPHILS # BLD AUTO: 139 CELLS/UL (ref 0–200)
BASOPHILS NFR BLD AUTO: 1.3 %
EOSINOPHIL # BLD AUTO: 1027 CELLS/UL (ref 15–500)
EOSINOPHIL NFR BLD AUTO: 9.6 %
ERYTHROCYTE [DISTWIDTH] IN BLOOD BY AUTOMATED COUNT: 14.2 % (ref 11–15)
HCT VFR BLD AUTO: 44.8 % (ref 35–45)
HGB BLD-MCNC: 14.9 G/DL (ref 11.7–15.5)
IGE SERPL-ACNC: 153 KU/L
LYMPHOCYTES # BLD AUTO: 4013 CELLS/UL (ref 850–3900)
LYMPHOCYTES NFR BLD AUTO: 37.5 %
MCH RBC QN AUTO: 32 PG (ref 27–33)
MCHC RBC AUTO-ENTMCNC: 33.3 G/DL (ref 32–36)
MCV RBC AUTO: 96.1 FL (ref 80–100)
MONOCYTES # BLD AUTO: 963 CELLS/UL (ref 200–950)
MONOCYTES NFR BLD AUTO: 9 %
NEUTROPHILS # BLD AUTO: 4558 CELLS/UL (ref 1500–7800)
NEUTROPHILS NFR BLD AUTO: 42.6 %
PLATELET # BLD AUTO: 256 THOUSAND/UL (ref 140–400)
PMV BLD REES-ECKER: 12.4 FL (ref 7.5–12.5)
RBC # BLD AUTO: 4.66 MILLION/UL (ref 3.8–5.1)
WBC # BLD AUTO: 10.7 THOUSAND/UL (ref 3.8–10.8)

## 2025-05-09 ENCOUNTER — TELEPHONE (OUTPATIENT)
Dept: PULMONOLOGY | Facility: HOSPITAL | Age: 60
End: 2025-05-09
Payer: COMMERCIAL

## 2025-05-09 NOTE — TELEPHONE ENCOUNTER
Faxed the patient assistant application to [place] to [facility].  Fax confirmation received.

## 2025-05-15 ENCOUNTER — TELEPHONE (OUTPATIENT)
Dept: PULMONOLOGY | Facility: CLINIC | Age: 60
End: 2025-05-15
Payer: COMMERCIAL

## 2025-05-15 NOTE — TELEPHONE ENCOUNTER
Pt called this morning and she said that she finished her prednisone 3 days ago and she said the pain came back upper right chest area she said that it feels like it is behind her ribs. I told the pt that you were on vacation and won't be returning until Monday May 19th and if the pain gets worse to go to ER or urgent care. I told Sinai when I hear back from the provider I will call her.    Thank you!

## 2025-05-26 ENCOUNTER — HOSPITAL ENCOUNTER (EMERGENCY)
Facility: HOSPITAL | Age: 60
Discharge: HOME | End: 2025-05-26
Attending: EMERGENCY MEDICINE
Payer: COMMERCIAL

## 2025-05-26 ENCOUNTER — APPOINTMENT (OUTPATIENT)
Dept: RADIOLOGY | Facility: HOSPITAL | Age: 60
End: 2025-05-26
Payer: COMMERCIAL

## 2025-05-26 PROCEDURE — 93005 ELECTROCARDIOGRAM TRACING: CPT

## 2025-05-26 PROCEDURE — 71275 CT ANGIOGRAPHY CHEST: CPT

## 2025-05-26 ASSESSMENT — PAIN DESCRIPTION - PAIN TYPE: TYPE: ACUTE PAIN

## 2025-05-26 ASSESSMENT — PAIN SCALES - GENERAL
PAINLEVEL_OUTOF10: 8
PAINLEVEL_OUTOF10: 5 - MODERATE PAIN

## 2025-05-26 ASSESSMENT — PAIN DESCRIPTION - ORIENTATION
ORIENTATION: UPPER;RIGHT
ORIENTATION: RIGHT

## 2025-05-26 ASSESSMENT — PAIN - FUNCTIONAL ASSESSMENT
PAIN_FUNCTIONAL_ASSESSMENT: 0-10
PAIN_FUNCTIONAL_ASSESSMENT: 0-10

## 2025-05-26 ASSESSMENT — PAIN DESCRIPTION - LOCATION
LOCATION: OTHER (COMMENT)
LOCATION: RIB CAGE

## 2025-05-26 NOTE — PROGRESS NOTES
This 60-year-old female patient seen evaluated and treated by Dr. Sr, he signed out the CAT scan of the chest to me.  Patient reported emergency room with a complaint of right-sided pleuritic chest pain.  Dr. Sr initially was under impression of acute pulm pain however she is denying this pain for years.  She also complained of enlarged liver which progressively got worse but she has been seen by hepatologist gastroenterologist she has upper endoscopy done she has been seen by a liver specialist required no intervention her liver test has been normal and she also has been under care of pulmonologist due to cough and congestion and pleuritic chest pain with chronic lung disease she used to be a smoker but does not smoke anymore.  Denies any pain in the left side chest any pain in arms or jaw.  She is depressed frustrated for pain she has been experiencing for years.    I reviewed her labs, her labs are completely unremarkable with CT showed no evidence of PE she has chronic lung disease symptoms with some atelectasis and groundglass opacity.  No definite acute consolidation.    Troponins x 2 was negative her labs are unremarkable.    I did offer her admission but she says she has this pain for years that she is frustrated that we cannot help her with the pain which she thinks is because coming from the liver.  I recommended she should follow with hepatologist and primary care physician as we have checked her out for any acute emergency conditions such as pneumonia, myocardial infarct or blood clot and all of the test was negative except she had chronic lung disease with groundglass opacity but she denies any cough or congestion or shortness of breath.    She is being discharged with close follow-up.  Advised if change her mind or any worsening symptoms or new symptoms or decided admitted return to ER  Ravinder Adkins MD

## 2025-05-26 NOTE — ED PROVIDER NOTES
HPI   Chief Complaint   Patient presents with    Chest Pain     Presents to ED with c/o right side pain directly under right axilla that started yesterday morning. States that it is worse with breathing, nothing makes it better. Denies any other symptoms.       Patient has had a similar pain in the same area on and off for 2 to 3 years but says the pain that started yesterday in her right lateral chest is much much worse than it has been in the past.  She states she is not sure if this is the same issue she been referred to numerous specialist for but the pain is improving.  The pain is in the right anterolateral rib cage and there is slight amount of tenderness and she says it is much worse when she tries to take a deep breath.            Patient History   Medical History[1]  Surgical History[2]  Family History[3]  Social History[4]    Physical Exam   ED Triage Vitals [05/26/25 0537]   Temperature Heart Rate Respirations BP   37 °C (98.6 °F) 61 18 (!) 131/91      Pulse Ox Temp Source Heart Rate Source Patient Position   96 % Temporal Monitor Sitting      BP Location FiO2 (%)     Left arm --       Physical Exam  Vitals and nursing note reviewed.   HENT:      Head: Normocephalic and atraumatic.      Right Ear: Ear canal normal.      Left Ear: Ear canal normal.      Nose: Nose normal.      Mouth/Throat:      Mouth: Mucous membranes are moist.   Cardiovascular:      Rate and Rhythm: Normal rate.   Pulmonary:      Effort: Pulmonary effort is normal.      Breath sounds: Normal breath sounds.      Comments: There is some tenderness in the right anterolateral ribs inferiorly.  There is tenderness but no crepitation or subcutaneous emphysema.  Abdominal:      General: Abdomen is flat.      Palpations: Abdomen is soft.   Musculoskeletal:         General: Normal range of motion.      Cervical back: Normal range of motion.   Skin:     General: Skin is warm and dry.   Neurological:      General: No focal deficit present.       Mental Status: She is alert.           ED Course & MDM   Diagnoses as of 05/26/25 2147   Right-sided chest pain   Chronic lung disease   Pleuritic chest pain                 No data recorded     [place] Coma Scale Score: 15 (05/26/25 0541 : Kaile Cohen RN)                           Medical Decision Making  EKG interpreted by me: Sinus rhythm with rate of 50.  No ST elevation.      Patient comes in this morning with pain in the right lateral chest.  She said this is the same place she has had some pain similar to this in the past 3 years but the pain this morning is much worse.  Hurts to press on the area and it hurts to take a deep breath.  No history of DVT or PE.  No recent surgery or cancer diagnosis.  At shift change I had not received any of the test results so I [facility] the [facility]legre who will review the test results and make final disposition.asha,         Procedure  Procedures       [1]   Past Medical History:  Diagnosis Date    Anxiety     Asthma     Migraine     Personal history of other diseases of the circulatory system 07/11/2022    History of hypertension   [2]   Past Surgical History:  Procedure Laterality Date    OTHER SURGICAL HISTORY  07/11/2022    Cholecystectomy    OTHER SURGICAL HISTORY  07/11/2022    Tonsillectomy   [3]   Family History  Problem Relation Name Age of Onset    Other (degenerative joint/disk disease) Mother      Hypertension Mother      Other (scolio) Mother      Leukemia Father      Hypertension Father      Cancer Father      Skin cancer Brother      Alcohol abuse Brother      No Known Problems Daughter      No Known Problems Daughter      No Known Problems Son      Colon cancer Maternal Grandmother      Lung cancer Maternal Grandfather      Heart disease Paternal Grandmother      Stroke Paternal Grandmother      Heart disease Paternal Grandfather      Hypertension Sibling      Cancer Sibling     [4]   Social History  Tobacco Use     Smoking status: Former     Current packs/day: 0.00     Average packs/day: 0.5 packs/day for 15.0 years (7.5 ttl pk-yrs)     Types: Cigarettes     Start date: 11/17/2006     Quit date: 11/17/2021     Years since quitting: 3.5    Smokeless tobacco: Never   Vaping Use    Vaping status: Never Used   Substance Use Topics    Alcohol use: Yes     Comment: social    Drug use: Yes     Types: Marijuana        Kenneth Sr MD  05/26/25 2723

## 2025-05-26 NOTE — DISCHARGE INSTRUCTIONS
As discussed the CAT scan showed no evidence of blood clot in the lung there was chronic lung disease some time pneumonia can present with groundglass opacity but most likely in your case you probably have chronic lung disease causing groundglass opacity.  You have a complaint of enlarged liver and has seen a liver specialist from pulmonologist and primary care which I recommend she follows your primary care physician and also see liver specialist because of your concern about enlarged liver.  Your blood test for the liver was unremarkable.  Your blood test for the heart was unremarkable.  You decided to be discharged as you have been having this pain for years, and you also have seen cardiologist did not found to have any coronary artery disease..  However if develop any new symptoms or worsening symptom or decided to be admitted return to ER.

## 2025-05-27 ENCOUNTER — APPOINTMENT (OUTPATIENT)
Dept: CARDIOLOGY | Facility: HOSPITAL | Age: 60
End: 2025-05-27
Payer: COMMERCIAL

## 2025-05-28 ENCOUNTER — CLINICAL SUPPORT (OUTPATIENT)
Dept: NUTRITION | Facility: HOSPITAL | Age: 60
End: 2025-05-28
Payer: COMMERCIAL

## 2025-05-28 NOTE — PROGRESS NOTES
Food For Life  Diet Recommendation 1: MyPlate  Diet Recommendation 2: Healthy Eating  Other Diet Recommendations: Low saturated fat, low added sugar  Food Intolerance Avoidance: NKFA  Nutrition Goals Stated: Reduce fried/fatty foods and added sugars.  Household Size: 3 Family Members  Interventions: Referral Number: 1st 6 Mo Referral 6 Mos  Interventions: Visit Number: 4 of 6 Visits - Max 6 Visits/Referral Each 6 Mo Period  Education Today: MyPlate Meals (Low Fat)  Grains: 50-75% Whole  Fruit: 50-75% Fresh  Vegetables: 50-75% Fresh  Proteins: 1-2 Plant-based Items  Dairy: 25-50% Lowfat  Originating Site of Referral Order: Jayda Villafana  Initials of RD Assisting Today: MAXWELL

## 2025-05-29 ENCOUNTER — APPOINTMENT (OUTPATIENT)
Dept: NUTRITION | Facility: HOSPITAL | Age: 60
End: 2025-05-29
Payer: COMMERCIAL

## 2025-06-02 ENCOUNTER — TELEPHONE (OUTPATIENT)
Dept: PULMONOLOGY | Facility: HOSPITAL | Age: 60
End: 2025-06-02
Payer: COMMERCIAL

## 2025-06-02 NOTE — TELEPHONE ENCOUNTER
Per the [facility] patient assistance program, they need one more form completed and signed before they can approve the patient for assistance.  The form should be faxed to me today.

## 2025-06-16 ENCOUNTER — TELEPHONE (OUTPATIENT)
Dept: SLEEP MEDICINE | Facility: CLINIC | Age: 60
End: 2025-06-16
Payer: COMMERCIAL

## 2025-06-16 DIAGNOSIS — J45.901 EXACERBATION OF ASTHMA, UNSPECIFIED ASTHMA SEVERITY, UNSPECIFIED WHETHER PERSISTENT (HHS-HCC): Primary | ICD-10-CM

## 2025-06-16 RX ORDER — PREDNISONE 10 MG/1
TABLET ORAL
Qty: 38 TABLET | Refills: 0 | Status: SHIPPED | OUTPATIENT
Start: 2025-06-16

## 2025-06-16 NOTE — TELEPHONE ENCOUNTER
Patient requesting a prescription for Prednisone to be sent to her pharmacy [facility], still waiting on nucala. Going out of town this this morning.

## 2025-06-30 ENCOUNTER — APPOINTMENT (OUTPATIENT)
Dept: PULMONOLOGY | Facility: CLINIC | Age: 60
End: 2025-06-30
Payer: COMMERCIAL

## 2025-06-30 ENCOUNTER — TELEPHONE (OUTPATIENT)
Dept: PRIMARY CARE | Facility: CLINIC | Age: 60
End: 2025-06-30

## 2025-06-30 VITALS
HEART RATE: 57 BPM | DIASTOLIC BLOOD PRESSURE: 86 MMHG | WEIGHT: 197 LBS | SYSTOLIC BLOOD PRESSURE: 132 MMHG | OXYGEN SATURATION: 97 % | BODY MASS INDEX: 38.47 KG/M2

## 2025-06-30 DIAGNOSIS — J40 BRONCHITIS: Primary | ICD-10-CM

## 2025-06-30 DIAGNOSIS — J45.901 EXACERBATION OF ASTHMA, UNSPECIFIED ASTHMA SEVERITY, UNSPECIFIED WHETHER PERSISTENT (HHS-HCC): ICD-10-CM

## 2025-06-30 DIAGNOSIS — D72.10 EOSINOPHILIA, UNSPECIFIED TYPE: ICD-10-CM

## 2025-06-30 PROCEDURE — 1036F TOBACCO NON-USER: CPT | Performed by: PEDIATRICS

## 2025-06-30 PROCEDURE — 99215 OFFICE O/P EST HI 40 MIN: CPT | Performed by: PEDIATRICS

## 2025-06-30 PROCEDURE — 3079F DIAST BP 80-89 MM HG: CPT | Performed by: PEDIATRICS

## 2025-06-30 PROCEDURE — 3075F SYST BP GE 130 - 139MM HG: CPT | Performed by: PEDIATRICS

## 2025-06-30 RX ORDER — PREDNISONE 10 MG/1
TABLET ORAL
Qty: 38 TABLET | Refills: 0 | Status: SHIPPED | OUTPATIENT
Start: 2025-06-30

## 2025-06-30 ASSESSMENT — PATIENT HEALTH QUESTIONNAIRE - PHQ9
SUM OF ALL RESPONSES TO PHQ9 QUESTIONS 1 AND 2: 1
2. FEELING DOWN, DEPRESSED OR HOPELESS: SEVERAL DAYS
1. LITTLE INTEREST OR PLEASURE IN DOING THINGS: NOT AT ALL

## 2025-06-30 NOTE — TELEPHONE ENCOUNTER
Patient states her right side pain has gotten worst, she has been up since 2 am because of the pain requesting sonograph she feels like her liver has enlarged. He has been seeing her pulmonology and he state that her eosinophils is elevated and has been climbing for some time that she should follow up with her pcp please advise last ov 2/14/2025

## 2025-06-30 NOTE — PROGRESS NOTES
Subjective   Patient ID: Sinai Mcbride is a 60 y.o. female who presents for asthma    HPI    6/30/2025:  Ms Mcbride is complaining of right sided chest wall pain.  This has been an ongoing issue with her.  Prednisone helps it.    She has not gotten the nucala as yet.  She continues to state something is wrong and it is killing her.  She is asking if she should see an allergist because of the high eosinophils.      Previous note 4/28/2025:  Ms Mcbride is complaining of increased shortness of breath and wheezing.  She is using symbicort and albuterol.  She is very emotional today stating something is wrong and no one can figure it out.    She asked me to get an ultrasound of her liver because it is enlarged, I explained I do not manage the liver, she then said she saw a hepatologist that told her nothing is wrong, she is not happy with that answer.  She then asked me to do something about her pain.  I told I do not do that either and she should see her pain specialist.  She was not happy with that answer either.  She was told about alpha-1 antitrypsin and she feels she has all the symptoms of that and apparently she has family members with that.  I told her her CT does not have the appearance of alpha-1 AT but we can do some testing.     She is now concerned about her sleep ([place] score today was 13.)     She did a PFT that shows small airways obstruction with bronchodilator response. Otherwise normal.     Initial visit 3/17/2025:  Mrs Mcbride is a 60yr old diagnosed with asthma during her second pregnancy.  She has been  on symbicort with albuterol as needed, which worked pretty well.  This year she has been worse with increased wheezing and more tirggers than in the past.  She recently was admitted with influenza and for the most part has recovered but is still has more wheezing.    I reviewed a CXR from 2/18/2025 which is normal, she had a CT chest 2/7/2025 that showed some mosaic changes.     She is a former smoker  1/2ppd age [age]    Review of Systems    See scanned documents attached to this note for review of systems, and appropriate scales/scores for this visit     Objective   Physical Exam  Constitutional:       Appearance: Normal appearance.   HENT:      Head: Normocephalic and atraumatic.      Mouth/Throat:      Pharynx: Oropharynx is clear.   Cardiovascular:      Rate and Rhythm: Normal rate and regular rhythm.      Pulses: Normal pulses.      Heart sounds: Normal heart sounds.   Pulmonary:      Effort: Pulmonary effort is normal.      Breath sounds: Normal breath sounds. No wheezing, rhonchi or rales.      Comments: Forced upper airway whistle( fake wheeze) but also with lower airway wheezing.  Abdominal:      General: Bowel sounds are normal.      Palpations: Abdomen is soft.   Musculoskeletal:         General: Normal range of motion.   Skin:     General: Skin is warm and dry.   Neurological:      General: No focal deficit present.      Mental Status: She is alert and oriented to person, place, and time.   Psychiatric:         Mood and Affect: Mood normal.             Assessment/Plan     60yr old with asthma, worse this year.       Asthma:  not as well controlled as in the past  - there are several people with asthma that have had a much worse time this past year.    - will get PFT but wait 1 month to get back to baseline  -  prednisone with taper (wheezing today)   - continue symbicort and albuterol as needed  4/28/2025: given increased symptoms I ordered prednisone with a taper.  She became very upset with that stating she can't live on prednisone.  I also escalated her inhaler to trelegy to see if that helps.  I discussed that the PFT is essentially normal but we can do some blood work to see if there is another treatment that might help.  I ordered IgE, CBC diff, alpha - 1 phenotype and level.  6/30/2025: IgE slightly elevated, high eosinophil count, still has not gotten nucala, she is not sure where things are  in the process.  Will reach out to Kori Torrez to find out.  Also robert Sheikh contact info to patient.  Alpha-1-AT level normal.  Prednisone with taper for wheezing  Gave contact information for [facility]     2. ?sleep disorder:  - ordered PSG today  6/30/2025: PSG not done/scheduled     She was not happy with this visit, she stated she spent her vacation saying goodbye to her family (she is sure she is dying).  She plans to go to the ER from my office due to the chest wall pain.           José Manuel Gerard MD 06/30/25 7:57 AM

## 2025-07-02 ENCOUNTER — SPECIALTY PHARMACY (OUTPATIENT)
Dept: PHARMACY | Facility: CLINIC | Age: 60
End: 2025-07-02

## 2025-07-02 DIAGNOSIS — J82.83 EOSINOPHILIC ASTHMA (HHS-HCC): Primary | ICD-10-CM

## 2025-07-02 RX ORDER — MEPOLIZUMAB 100 MG/ML
100 INJECTION, SOLUTION SUBCUTANEOUS
Qty: 1 EACH | Refills: 11 | Status: SHIPPED | OUTPATIENT
Start: 2025-07-02

## 2025-07-07 DIAGNOSIS — J82.83 EOSINOPHILIC ASTHMA (HHS-HCC): Primary | ICD-10-CM

## 2025-07-07 RX ORDER — BENRALIZUMAB 30 MG/ML
30 INJECTION, SOLUTION SUBCUTANEOUS
Qty: 1 ML | Refills: 6 | Status: SHIPPED | OUTPATIENT
Start: 2025-07-07

## 2025-07-07 RX ORDER — BENRALIZUMAB 30 MG/ML
30 INJECTION, SOLUTION SUBCUTANEOUS
Qty: 3 ML | Refills: 0 | Status: SHIPPED | OUTPATIENT
Start: 2025-07-07 | End: 2025-09-02

## 2025-07-08 ENCOUNTER — TELEPHONE (OUTPATIENT)
Dept: PULMONOLOGY | Facility: HOSPITAL | Age: 60
End: 2025-07-08
Payer: COMMERCIAL

## 2025-07-08 NOTE — TELEPHONE ENCOUNTER
Patient questioning what the approval message she received was regarding.  Explained to the patient that her insurance doesn't cover Nucala but does cover another biologic, Fasenra.  Because of this, Dr. Gerard sent a prescription for Fasenra which is the approval message she received.  Educated the patient that  [facility] will call her to schedule delivery and if she doesn't answer, they will leave a message.  Answered all of the patient's questions to her satisfaction.  
(M6) obeys commands

## 2025-07-08 NOTE — TELEPHONE ENCOUNTER
Since the patient's insurance doesn't cover Nucala, Dr. Gerard sent a prescription for Fasenra to  [facility].

## 2025-07-08 NOTE — TELEPHONE ENCOUNTER
She is calling again.  She still has the pain in her side and is asking if you will order the ultrasound to find out why she is in so much pain

## 2025-07-15 ENCOUNTER — TELEPHONE (OUTPATIENT)
Dept: PULMONOLOGY | Facility: HOSPITAL | Age: 60
End: 2025-07-15
Payer: COMMERCIAL

## 2025-07-15 ASSESSMENT — ENCOUNTER SYMPTOMS
ENDOCRINE NEGATIVE: 1
SHORTNESS OF BREATH: 0
FEVER: 0
DIARRHEA: 0
DIFFICULTY URINATING: 0
NAUSEA: 0
DIZZINESS: 0

## 2025-07-15 NOTE — PROGRESS NOTES
Subjective   Patient ID: Sinai Mcbride is a 60 y.o. female who presents for painful rash on right side    HPI   Patient is presenting to the clinic to evaluate painful rash on her right side ongoing for 3 weeks.    Pt reports painful rash on her right side that is very itchy.  Wants to get imaging of her liver. Pt thinks her liver has grown.  Pt wants to get lab work     Asthma: getting Fasenra 30mg/ML every 28 days and using albuterol 90 mcg inhaler    Review of Systems   Constitutional:  Negative for fever.        Also see HPI   Eyes:  Negative for visual disturbance.   Respiratory:  Negative for shortness of breath.    Cardiovascular:  Negative for chest pain.   Gastrointestinal:  Negative for diarrhea and nausea.   Endocrine: Negative.    Genitourinary:  Negative for difficulty urinating.   Skin:  Positive for rash.   Neurological:  Negative for dizziness.        No focal deficits   Psychiatric/Behavioral:  Negative for suicidal ideas.    All other systems reviewed and are negative.      Objective   /82   Pulse 52   Temp 36.6 °C (97.8 °F)   Ht (!) 1.524 m (5')   Wt 88.8 kg (195 lb 12.8 oz)   SpO2 96%   BMI 38.24 kg/m²     Physical Exam  Vitals and nursing note reviewed.   Constitutional:       Appearance: Normal appearance.   HENT:      Head: Normocephalic and atraumatic.     Eyes:      Conjunctiva/sclera: Conjunctivae normal.       Cardiovascular:      Rate and Rhythm: Normal rate and regular rhythm.      Heart sounds: Normal heart sounds.   Pulmonary:      Effort: Pulmonary effort is normal.      Breath sounds: Normal breath sounds.     Skin:     Comments: Cluster of vesicles right lateral thorax-shingles     Neurological:      Mental Status: She is oriented to person, place, and time.     Psychiatric:         Mood and Affect: Mood normal.         Behavior: Behavior normal.         Assessment/Plan   Diagnoses and all orders for this visit:  Herpes zoster without complication  -     valACYclovir  (Valtrex) 1 gram tablet; Take 1 tablet (1,000 mg) by mouth 3 times a day for 7 days.  -     lidocaine (Lidoderm) 5 % patch; Place 1 patch over 12 hours on the skin once daily. Apply to painful area 12 hours per day, remove for 12 hours.  -     CBC and Auto Differential; Future  -     Comprehensive Metabolic Panel; Future  Hepatomegaly  -     US abdomen limited liver; Future  -     CBC and Auto Differential; Future  -     Comprehensive Metabolic Panel; Future       Scribe Attestation  By signing my name below, IAsh Scribe   attest that this documentation has been prepared under the direction and in the presence of Clement Zarate DO.

## 2025-07-15 NOTE — TELEPHONE ENCOUNTER
Margot Boateng, this nurse's supervisor, forward a voicemail from the patient regarding her Fasenra injection.  Patient was very agitated because the Fasenra was approved on 7/8/2025 and she still hasn't received it.  Explained to the patient that although the Nucala was originally ordered, her insurance doesn't cover it so Dr. Gerard prescribed Fasenra which is on the patient's formulary.  Educated the patient again that since this is a specialty medication, it needs to go to a specialty pharmacy for processing.  This takes more time than a regular prescription because of the type of medication it is.  Reiterated again that someone from  [facility] will contact her once the Fasenra is ready to be delivered.   While on the phone, the patient started to complain that she has been having chest pain since April and questioned what was she supposed to do.  Since the patient was at work, this nurse offered to provide the patient with her direct phone number so the patient can call when she had time to discuss in detail about her symptoms and see if there was something that can be done to help ease her pain until the Fasenra was delivered.  Patient stated that she didn't need any help and hung up the phone.  This nurse sent a message to  [facility] to see if there was anything that could be done to expedite the prescription.

## 2025-07-16 ENCOUNTER — SPECIALTY PHARMACY (OUTPATIENT)
Dept: PHARMACY | Facility: CLINIC | Age: 60
End: 2025-07-16

## 2025-07-16 ENCOUNTER — OFFICE VISIT (OUTPATIENT)
Dept: PRIMARY CARE | Facility: CLINIC | Age: 60
End: 2025-07-16
Payer: COMMERCIAL

## 2025-07-16 VITALS
OXYGEN SATURATION: 96 % | BODY MASS INDEX: 38.44 KG/M2 | WEIGHT: 195.8 LBS | HEART RATE: 52 BPM | SYSTOLIC BLOOD PRESSURE: 140 MMHG | DIASTOLIC BLOOD PRESSURE: 82 MMHG | HEIGHT: 60 IN | TEMPERATURE: 97.8 F

## 2025-07-16 DIAGNOSIS — B02.9 HERPES ZOSTER WITHOUT COMPLICATION: Primary | ICD-10-CM

## 2025-07-16 DIAGNOSIS — R16.0 HEPATOMEGALY: ICD-10-CM

## 2025-07-16 PROCEDURE — 3079F DIAST BP 80-89 MM HG: CPT | Performed by: FAMILY MEDICINE

## 2025-07-16 PROCEDURE — 99213 OFFICE O/P EST LOW 20 MIN: CPT | Performed by: FAMILY MEDICINE

## 2025-07-16 PROCEDURE — RXMED WILLOW AMBULATORY MEDICATION CHARGE

## 2025-07-16 PROCEDURE — 3077F SYST BP >= 140 MM HG: CPT | Performed by: FAMILY MEDICINE

## 2025-07-16 PROCEDURE — 3008F BODY MASS INDEX DOCD: CPT | Performed by: FAMILY MEDICINE

## 2025-07-16 RX ORDER — LIDOCAINE 50 MG/G
1 PATCH TOPICAL DAILY
Qty: 30 PATCH | Refills: 0 | Status: SHIPPED | OUTPATIENT
Start: 2025-07-16

## 2025-07-16 RX ORDER — VALACYCLOVIR HYDROCHLORIDE 1 G/1
1000 TABLET, FILM COATED ORAL 3 TIMES DAILY
Qty: 21 TABLET | Refills: 0 | Status: SHIPPED | OUTPATIENT
Start: 2025-07-16 | End: 2025-07-23

## 2025-07-16 ASSESSMENT — PAIN SCALES - GENERAL: PAINLEVEL_OUTOF10: 8

## 2025-07-17 ENCOUNTER — HOSPITAL ENCOUNTER (OUTPATIENT)
Dept: RADIOLOGY | Facility: HOSPITAL | Age: 60
Discharge: HOME | End: 2025-07-17
Payer: COMMERCIAL

## 2025-07-17 ENCOUNTER — PHARMACY VISIT (OUTPATIENT)
Dept: PHARMACY | Facility: CLINIC | Age: 60
End: 2025-07-17
Payer: MEDICAID

## 2025-07-17 DIAGNOSIS — R16.0 HEPATOMEGALY: ICD-10-CM

## 2025-07-17 PROCEDURE — 76705 ECHO EXAM OF ABDOMEN: CPT | Performed by: STUDENT IN AN ORGANIZED HEALTH CARE EDUCATION/TRAINING PROGRAM

## 2025-07-17 PROCEDURE — 76705 ECHO EXAM OF ABDOMEN: CPT

## 2025-07-18 ENCOUNTER — SPECIALTY PHARMACY (OUTPATIENT)
Dept: PHARMACY | Facility: CLINIC | Age: 60
End: 2025-07-18

## 2025-07-18 LAB
ALBUMIN SERPL-MCNC: 4.3 G/DL (ref 3.6–5.1)
ALP SERPL-CCNC: 57 U/L (ref 37–153)
ALT SERPL-CCNC: 12 U/L (ref 6–29)
ANION GAP SERPL CALCULATED.4IONS-SCNC: 9 MMOL/L (CALC) (ref 7–17)
AST SERPL-CCNC: 13 U/L (ref 10–35)
BASOPHILS # BLD AUTO: 122 CELLS/UL (ref 0–200)
BASOPHILS NFR BLD AUTO: 1.4 %
BILIRUB SERPL-MCNC: 1.2 MG/DL (ref 0.2–1.2)
BUN SERPL-MCNC: 10 MG/DL (ref 7–25)
CALCIUM SERPL-MCNC: 9.7 MG/DL (ref 8.6–10.4)
CHLORIDE SERPL-SCNC: 106 MMOL/L (ref 98–110)
CO2 SERPL-SCNC: 25 MMOL/L (ref 20–32)
CREAT SERPL-MCNC: 0.61 MG/DL (ref 0.5–1.03)
EGFRCR SERPLBLD CKD-EPI 2021: 103 ML/MIN/1.73M2
EOSINOPHIL # BLD AUTO: 1479 CELLS/UL (ref 15–500)
EOSINOPHIL NFR BLD AUTO: 17 %
ERYTHROCYTE [DISTWIDTH] IN BLOOD BY AUTOMATED COUNT: 13.2 % (ref 11–15)
GLUCOSE SERPL-MCNC: 108 MG/DL (ref 65–99)
HCT VFR BLD AUTO: 49.4 % (ref 35–45)
HGB BLD-MCNC: 16 G/DL (ref 11.7–15.5)
LYMPHOCYTES # BLD AUTO: 2201 CELLS/UL (ref 850–3900)
LYMPHOCYTES NFR BLD AUTO: 25.3 %
MCH RBC QN AUTO: 32.4 PG (ref 27–33)
MCHC RBC AUTO-ENTMCNC: 32.4 G/DL (ref 32–36)
MCV RBC AUTO: 100 FL (ref 80–100)
MONOCYTES # BLD AUTO: 574 CELLS/UL (ref 200–950)
MONOCYTES NFR BLD AUTO: 6.6 %
NEUTROPHILS # BLD AUTO: 4324 CELLS/UL (ref 1500–7800)
NEUTROPHILS NFR BLD AUTO: 49.7 %
PLATELET # BLD AUTO: 259 THOUSAND/UL (ref 140–400)
PMV BLD REES-ECKER: 11.6 FL (ref 7.5–12.5)
POTASSIUM SERPL-SCNC: 4 MMOL/L (ref 3.5–5.3)
PROT SERPL-MCNC: 6.6 G/DL (ref 6.1–8.1)
RBC # BLD AUTO: 4.94 MILLION/UL (ref 3.8–5.1)
SODIUM SERPL-SCNC: 140 MMOL/L (ref 135–146)
WBC # BLD AUTO: 8.7 THOUSAND/UL (ref 3.8–10.8)

## 2025-07-18 NOTE — PROGRESS NOTES
[facility] Clinical Note  Initial Patient Education     Introduction  Sinai Mcbride is a 60 y.o. female who is on the specialty pharmacy service for management of: Pulmonology Core.    Sinai Mcbride is initiating the following therapy: Fasenra 30mg pen under the skin every 4 weeks x 3 doses then 30mg pen under the skin every 8 weeks.    Medication receipt date: 7/18/2025    Duration of therapy: Maintenance    The most recent encounter visit with the referring prescriber José Manuel Gerard MD on 6/30/2025 was reviewed. Pharmacy will continue to collaborate in the care of this patient with the referring prescriber.    Clinical Background  An initial assessment was conducted prior to first fill of the medication to determine the appropriateness of therapy given the patient's diagnosis, medication list, comorbidities, allergies, medical history, patient's ability to self administer medication, and therapeutic goals based on possible outcomes of therapy. Refer to initial assessment task completed on 7/16/2025.    Labs for clinical appropriateness that were reviewed include:   Asthma/Immunology - CBC-diff:   Lab Results   Component Value Date    WBC 8.7 07/17/2025    RBC 4.94 07/17/2025    HGB 16.0 (H) 07/17/2025    HCT 49.4 (H) 07/17/2025    .0 07/17/2025    MCHC 32.4 07/17/2025     07/17/2025    RDW 13.2 07/17/2025    NEUTOPHILPCT 33.8 05/26/2025    IGPCT 0.3 05/26/2025    LYMPHOPCT 25.3 07/17/2025    MONOPCT 6.6 07/17/2025    EOSPCT 17.0 07/17/2025    BASOPCT 1.4 07/17/2025    NEUTROABS 2.71 05/26/2025    LYMPHSABS 2.36 05/26/2025    MONOSABS 0.86 05/26/2025    EOSABS 1,479 (H) 07/17/2025    BASOSABS 122 07/17/2025     Education/Discussion  Sinai was contacted on 7/18/2025 at 10:30 AM for a pharmacy visit with encounter number [id number] from:   [facility]  [address] [place] [id number]  Dept: [phone]  Dept Fax: [phone]  Sinai  "consented to a Telephone visit, which was performed.    Medication Start Date (planned or actual): TBD  Education was conducted prior to start of therapy? Yes    Education discussed includes the following:  Patient Education  Counseled the Patient on the Following : Theraputic rationale and expected outcomes, Contraindications and precautions, Expected duration of therapy, Doses and administration, Adherence and missed doses, Possible side effects and management, Possible drug interactions, Lab monitoring and follow-up, Safe handling, storage, and disposal, Associated vaccinations, Pharmacy contact information  Learner: Patient  Education Method: Explanation  Education Response: Verbalizes understanding  Additional details of the medication specific counseling are found within the linked patient education flowsheet.     The follow up timeline was discussed. Every person responds to and reacts to therapy differently. Patient should be assessed for efficacy and tolerability in approximately: 3 months    Provided education on goals and possible outcomes of therapy:  Adherence with therapy  Timely completion of appropriate labs  Timely and appropriate follow up with provider  Identify and address medication interactions with presciption medications, OTC medications and supplements  Optimize or maintain quality of life  Asthma/Immunology: Improve FEV1 (asthma, COPD target)  Reduce frequency of asthma symptoms such as coughing/wheezing, shortness of breath, and chest tightness  Reduce need for \"prn\" inhalers (asthma)    The importance of adherence was discussed and they were advised to take the medication as prescribed by their provider.     Impression/Plan  Review and Assessment   Reviewed During This Encounter: Medications, Problem list  Medications Assessed for Appropriate Use, Dose, Route, Frequency, and Duration: Yes  Medication Reconciliation Completed: Yes (At [facility])  Drug Interactions Evaluated: Yes (At " [facility])  Clinically Relevant Drug Interactions Identified: No    This patient has been identified as high risk due to Others deemed high risk on a case by case basis.  The following action was taken: Patient/caregiver encouraged to participate in patient management program and MTP (clinical intervention) documented.    QOL/Patient Satisfaction  Rate your quality of life on scale of 1-10:  (Unable to fully assess)  Rate your satisfaction with  Specialty Pharmacy on scale of 1-10: 10 - Completely satisfied (No issues reported)    The  Specialty Pharmacy Welcome packet may be viewed here:   Specialty Pharmacy Welcome Packet     Or by scanning QR code:      Provided contact information ([phone]) for [facility] and reviewed dispensing process, refill timeline and patient management follow up. Advised to contact the pharmacy if there are any adverse effects and/or changes to medication list, including prescriptions, OTC medications, herbal products, or supplements. Confirmed understanding of education conducted during assessment. All questions and concerns were addressed and patient was encouraged to reach out for additional questions or concerns.    Belen Gonzalez, FainaD

## 2025-07-25 ENCOUNTER — TELEPHONE (OUTPATIENT)
Dept: PRIMARY CARE | Facility: CLINIC | Age: 60
End: 2025-07-25
Payer: COMMERCIAL

## 2025-07-25 NOTE — TELEPHONE ENCOUNTER
She is calling to ask for a referral for a rheumatologist. She is having pain in her side, chest and arm. She said it is not stabbing pain, just a dull ache all the time and her hands are tingling and numb at times.   She said she saw pain management and they told her to see a rheumatologist.

## 2025-07-27 ENCOUNTER — RESULTS FOLLOW-UP (OUTPATIENT)
Dept: PRIMARY CARE | Facility: CLINIC | Age: 60
End: 2025-07-27
Payer: COMMERCIAL

## 2025-07-30 ENCOUNTER — CLINICAL SUPPORT (OUTPATIENT)
Dept: NUTRITION | Facility: HOSPITAL | Age: 60
End: 2025-07-30
Payer: COMMERCIAL

## 2025-07-30 NOTE — PROGRESS NOTES
Food For Life  Diet Recommendation 1: MyPlate  Diet Recommendation 2: Healthy Eating  Other Diet Recommendations: Low saturated fat, low added sugar  Food Intolerance Avoidance: NKFA  Nutrition Goals Stated: Reduce fried/fatty foods, added sugars, and include a fresh fruit/vegetable at every meal.  Household Size: 2 Family Members  Interventions: Referral Number: 1st 6 Mo Referral 6 Mos  Interventions: Visit Number: 5 of 6 Visits - Max 6 Visits/Referral Each 6 Mo Period  Other Interventions: Pt is feeling better and described starting new medication that has helped with pain. Pt has been following with provider regarding pain. Encouraged pt to ask questions regarding medications to provider.  Education Today: Healthy Recipes  Recipes Today: Variety  Grains: 0-25% Whole  Fruit: % Fresh  Vegetables: Fresh - 100%  Proteins: 1-2 Plant-based Items  Dairy: 25-50% Lowfat  Originating Site of Referral Order: [facility] of [facility] Assisting Today: [facility]

## 2025-08-07 ENCOUNTER — TELEPHONE (OUTPATIENT)
Dept: PRIMARY CARE | Facility: CLINIC | Age: 60
End: 2025-08-07
Payer: COMMERCIAL

## 2025-08-07 ENCOUNTER — SPECIALTY PHARMACY (OUTPATIENT)
Dept: PHARMACY | Facility: CLINIC | Age: 60
End: 2025-08-07

## 2025-08-07 PROCEDURE — RXMED WILLOW AMBULATORY MEDICATION CHARGE

## 2025-08-07 NOTE — TELEPHONE ENCOUNTER
Patient is calling because 2 1/2 weeks ago got the faserna injection and she thinks the shot helped the pain in her side.  She wanted you to know that it helping.

## 2025-08-12 ENCOUNTER — PHARMACY VISIT (OUTPATIENT)
Dept: PHARMACY | Facility: CLINIC | Age: 60
End: 2025-08-12
Payer: MEDICAID

## 2025-08-13 DIAGNOSIS — B02.9 HERPES ZOSTER WITHOUT COMPLICATION: ICD-10-CM

## 2025-08-19 ENCOUNTER — OFFICE VISIT (OUTPATIENT)
Dept: PRIMARY CARE | Facility: CLINIC | Age: 60
End: 2025-08-19
Payer: COMMERCIAL

## 2025-08-19 VITALS
HEIGHT: 60 IN | DIASTOLIC BLOOD PRESSURE: 90 MMHG | SYSTOLIC BLOOD PRESSURE: 140 MMHG | HEART RATE: 47 BPM | BODY MASS INDEX: 37.58 KG/M2 | WEIGHT: 191.4 LBS | OXYGEN SATURATION: 96 % | TEMPERATURE: 97.9 F

## 2025-08-19 DIAGNOSIS — Z12.31 ENCOUNTER FOR SCREENING MAMMOGRAM FOR BREAST CANCER: ICD-10-CM

## 2025-08-19 DIAGNOSIS — R20.2 NUMBNESS AND TINGLING IN BOTH HANDS: Primary | ICD-10-CM

## 2025-08-19 DIAGNOSIS — R20.0 NUMBNESS AND TINGLING IN BOTH HANDS: Primary | ICD-10-CM

## 2025-08-19 PROCEDURE — 99213 OFFICE O/P EST LOW 20 MIN: CPT | Performed by: FAMILY MEDICINE

## 2025-08-19 PROCEDURE — 3077F SYST BP >= 140 MM HG: CPT | Performed by: FAMILY MEDICINE

## 2025-08-19 PROCEDURE — 3080F DIAST BP >= 90 MM HG: CPT | Performed by: FAMILY MEDICINE

## 2025-08-19 PROCEDURE — 3008F BODY MASS INDEX DOCD: CPT | Performed by: FAMILY MEDICINE

## 2025-08-19 RX ORDER — LIDOCAINE 50 MG/G
PATCH TOPICAL
Qty: 30 PATCH | Refills: 2 | Status: SHIPPED | OUTPATIENT
Start: 2025-08-19

## 2025-08-19 RX ORDER — GABAPENTIN 300 MG/1
300 CAPSULE ORAL 2 TIMES DAILY
Qty: 60 CAPSULE | Refills: 2 | Status: SHIPPED | OUTPATIENT
Start: 2025-08-19

## 2025-08-19 ASSESSMENT — ENCOUNTER SYMPTOMS
SHORTNESS OF BREATH: 0
OCCASIONAL FEELINGS OF UNSTEADINESS: 0
ENDOCRINE NEGATIVE: 1
DEPRESSION: 0
DIFFICULTY URINATING: 0
NUMBNESS: 1
DIARRHEA: 0
DIZZINESS: 0
FEVER: 0
NAUSEA: 0
LOSS OF SENSATION IN FEET: 0

## 2025-08-19 ASSESSMENT — PAIN SCALES - GENERAL: PAINLEVEL_OUTOF10: 5

## 2025-08-27 ENCOUNTER — CLINICAL SUPPORT (OUTPATIENT)
Dept: NUTRITION | Facility: HOSPITAL | Age: 60
End: 2025-08-27
Payer: COMMERCIAL

## 2025-09-04 ENCOUNTER — SPECIALTY PHARMACY (OUTPATIENT)
Dept: PHARMACY | Facility: CLINIC | Age: 60
End: 2025-09-04

## 2025-09-22 ENCOUNTER — APPOINTMENT (OUTPATIENT)
Dept: PULMONOLOGY | Facility: CLINIC | Age: 60
End: 2025-09-22
Payer: COMMERCIAL